# Patient Record
Sex: FEMALE | Race: BLACK OR AFRICAN AMERICAN | URBAN - METROPOLITAN AREA
[De-identification: names, ages, dates, MRNs, and addresses within clinical notes are randomized per-mention and may not be internally consistent; named-entity substitution may affect disease eponyms.]

---

## 2017-01-26 LAB
CHLAMYDIA, EXTERNAL: POSITIVE
HIV, EXTERNAL: NEGATIVE
N. GONORRHEA, EXTERNAL: NEGATIVE

## 2017-04-06 LAB
ANTIBODY SCREEN, EXTERNAL: NEGATIVE
CHLAMYDIA, EXTERNAL: NEGATIVE
HBSAG, EXTERNAL: NEGATIVE
RPR, EXTERNAL: NORMAL
RUBELLA, EXTERNAL: NORMAL
TYPE, ABO & RH, EXTERNAL: NORMAL

## 2017-08-07 ENCOUNTER — OFFICE VISIT (OUTPATIENT)
Dept: OBGYN CLINIC | Age: 22
End: 2017-08-07

## 2017-08-07 VITALS
WEIGHT: 245 LBS | HEIGHT: 64 IN | DIASTOLIC BLOOD PRESSURE: 74 MMHG | BODY MASS INDEX: 41.83 KG/M2 | SYSTOLIC BLOOD PRESSURE: 124 MMHG

## 2017-08-07 DIAGNOSIS — Z34.03 ENCOUNTER FOR SUPERVISION OF NORMAL FIRST PREGNANCY IN THIRD TRIMESTER: ICD-10-CM

## 2017-08-07 PROBLEM — Z34.00 SUPERVISION OF NORMAL FIRST PREGNANCY: Status: ACTIVE | Noted: 2017-08-07

## 2017-08-07 NOTE — MR AVS SNAPSHOT
Visit Information Date & Time Provider Department Dept. Phone Encounter #  
 8/7/2017  1:50 PM Renard Macias, [place] [phone] [id number] Upcoming Health Maintenance Date Due  
 HPV AGE 9Y-34Y (1 of 3 - Female 3 Dose Series) 5/15/2007 PAP AKA CERVICAL CYTOLOGY 5/15/2017 OB 3RD TRIMESTER TDAP 7/13/2017 INFLUENZA AGE 9 TO ADULT 8/1/2017 Allergies as of 8/7/2017  Review Complete On: 8/7/2017 By: Shauna Harding Severity Noted Reaction Type Reactions Ibuprofen  03/04/2015    Itching Tylenol [Acetaminophen]  08/07/2017    Unknown (comments) Current Immunizations  Never Reviewed No immunizations on file. Not reviewed this visit Vitals BP Height(growth percentile) Weight(growth percentile) BMI OB Status Smoking Status 124/74 5' 4\" (1.626 m) 245 lb (111.1 kg) 42.05 kg/m2 Pregnant Never Smoker BMI and BSA Data Body Mass Index Body Surface Area 42.05 kg/m 2 2.24 m 2 Your Updated Medication List  
  
   
This list is accurate as of: 8/7/17  2:26 PM.  Always use your most recent med list.  
  
  
  
  
 HYDROcodone-acetaminophen 5-325 mg per tablet Commonly known as:  Gibson Ojeda Take 1-2 Tabs by mouth every four (4) hours as needed for Pain. Max Daily Amount: 12 Tabs. Patient Instructions Learning About Pregnancy Your Care Instructions Your health in the early weeks of your pregnancy is particularly important for your babys health. Take good care of yourself. Anything you do that harms your body can also harm your baby. Make sure to go to all of your doctor appointments. Regular checkups will help keep you and your baby healthy. Follow-up care is a key part of your treatment and safety. Be sure to make and go to all appointments, and call your doctor if you are having problems. Its also a good idea to know your test results and keep a list of the medicines you take. How can you care for yourself at home? Diet · Eat a balanced diet. Make sure your diet includes plenty of beans, peas, and leafy green vegetables. · Do not skip meals or go for many hours without eating. If you are nauseated, try to eat a small, healthy snack every 2 to 3 hours. · Do not eat fish that has a high level of mercury, such as shark, swordfish, or mackerel. Do not eat more than one can of tuna each week. · Drink plenty of fluids, enough so that your urine is light yellow or clear like water. If you have kidney, heart, or liver disease and have to limit fluids, talk with your doctor before you increase the amount of fluids you drink. · Cut down on caffeine, such as coffee, tea, and cola. · Do not drink alcohol, such as beer, wine, or hard liquor. · Take a multivitamin that contains at least 400 micrograms (mcg) of folic acid to help prevent birth defects. Fortified cereal and whole wheat bread are good additional sources of folic acid. · Increase the calcium in your diet. Try to drink a quart of skim milk each day. You may also take calcium supplements and choose foods such as cheese and yogurt. Lifestyle · Make sure you go to your follow-up appointments. · Get plenty of rest. You may be unusually tired while you are pregnant. · Get at least 30 minutes of exercise on most days of the week. Walking is a good choice. If you have not exercised in the past, start out slowly. Take several short walks each day. · Do not smoke. If you need help quitting, talk to your doctor about stop-smoking programs. These can increase your chances of quitting for good. · Do not touch cat feces or litter boxes. Also, wash your hands after you handle raw meat, and fully cook all meat before you eat it. Wear gloves when you work in the yard or garden, and wash your hands well when you are done.  Cat feces, raw or undercooked meat, and contaminated dirt can cause an infection that may harm your baby or lead to a miscarriage. · Do not use saunas or hot tubs. Raising your body temperature may harm your baby. · Avoid chemical fumes, paint fumes, or poisons. · Do not use illegal drugs or alcohol. Medicines · Review all of your medicines with your doctor. Some of your routine medicines may need to be changed to protect your baby. · Use acetaminophen (Tylenol) to relieve minor problems, such as a mild headache or backache or a mild fever with cold symptoms. Do not use nonsteroidal anti-inflammatory drugs (NSAIDs), such as ibuprofen (Advil, Motrin) or naproxen (Aleve), unless your doctor says it is okay. · Do not take two or more pain medicines at the same time unless the doctor told you to. Many pain medicines have acetaminophen, which is Tylenol. Too much acetaminophen (Tylenol) can be harmful. · Take your medicines exactly as prescribed. Call your doctor if you think you are having a problem with your medicine. To manage morning sickness · If you feel sick when you first wake up, try eating a small snack (such as crackers) before you get out of bed. Allow some time to digest the snack, and then get out of bed slowly. · Do not skip meals or go for long periods without eating. An empty stomach can make nausea worse. · Eat small, frequent meals instead of three large meals each day. · Drink plenty of fluids. Sports drinks, such as Gatorade or Powerade, are good choices. · Eat foods that are high in protein but low in fat. · If you are taking iron supplements, ask your doctor if they are necessary. Iron can make nausea worse. · Avoid any smells, such as coffee, that make you feel sick. · Get lots of rest. Morning sickness may be worse when you are tired. Where can you learn more? Go to http://kirby-annalee.info/. Enter Z326 in the search box to learn more about \"Learning About Pregnancy. \" Current as of: March 16, 2017 Content Version: 11.3 © 8617-9094 [employer]. Care instructions adapted under license by [employer] (which disclaims liability or warranty for this information). If you have questions about a medical condition or this instruction, always ask your healthcare professional. [address] any warranty or liability for your use of this information. Introducing [facility]! [facility] introduces [employer] patient portal. Now you can access parts of your medical record, email your doctor's office, and request medication refills online. 1. In your internet browser, go to [email or web address] 2. Click on the First Time User? Click Here link in the Sign In box. You will see the New Member Sign Up page. 3. Enter your [employer] Access Code exactly as it appears below. You will not need to use this code after youve completed the sign-up process. If you do not sign up before the expiration date, you must request a new code. · [employer] Access Code: [email or web address] Expires: 11/5/2017  2:26 PM 
 
4. Enter the last four digits of your Social Security Number (xxxx) and Date of Birth (mm/dd/yyyy) as indicated and click Submit. You will be taken to the next sign-up page. 5. Create a [employer] ID. This will be your [employer] login ID and cannot be changed, so think of one that is secure and easy to remember. 6. Create a [employer] password. You can change your password at any time. 7. Enter your Password Reset Question and Answer. This can be used at a later time if you forget your password. 8. Enter your e-mail address. You will receive e-mail notification when new information is available in [address] 9. Click Sign Up. You can now view and download portions of your medical record. 10. Click the Download Summary menu link to download a portable copy of your medical information.  
 
If you have questions, please visit the Frequently Asked Questions section of the [employer]. Remember, [employer] is NOT to be used for urgent needs. For medical emergencies, dial 911. Now available from your iPhone and Android! Please provide this summary of care documentation to your next provider. Your primary care clinician is listed as NONE. If you have any questions after today's visit, please call [phone].

## 2017-08-07 NOTE — PROGRESS NOTES
Current pregnancy history:    Lou Feliz is a ,  25 y.o. female [address] No LMP recorded. Patient is pregnant. .  She presents for the evaluation of amenorrhea and a positive pregnancy test.    She is a transfer pregnant pt-- her records are available     Pregnancy symptoms:    Since her LMP she has experienced  urinary frequency, breast tenderness, and nausea. She has not been vomiting over the last few weeks. Associated signs and symptoms which she denies: dysuria, discharge, vaginal bleeding. Relevant past pregnancy history:   She has the following pregnancy history: first pregnancy   She has no history of  delivery. Relevant past medical history:(relevant to this pregnancy): noncontributory. Substance history: negative for alcohol, tobacco and street drugs. Positive for nothing. Exposure history: There is/are no indoor cat/s in the home. The patient was instructed to not change the cat litter. She admits close contact with children on a regular basis. She has had chicken pox or the vaccine in the past.   Patient denies issues with domestic violence. Genetic Screening/Teratology Counseling: (Includes patient, baby's father, or anyone in either family with:)  3.  Patient's age >/= 28 at [facility]?-- no  .   2. Thalassemia ([facility], [facility], [address], or  background): MCV<80?--no.     3.  Neural tube defect (meningomyelocele, spina bifida, anencephaly)?--no.   4.  Congenital heart defect?--no.  5.  Down syndrome?--no.   6.  Aman-Sachs (Gn[employer]ist, [place])?--no.   7.  Canavan's Disease?--no.   8.  Familial Dysautonomia?--no.   9.  Sickle cell disease or trait ()? --no   The patient has not been tested for sickle trait  10. Hemophilia or other blood disorders?--no. 11.  Muscular dystrophy?--no. 12.  Cystic fibrosis?--no. 13.  Morena's Chorea?--no. 14.  Mental retardation/autism (if yes was person tested for Fragile X)?--no. 15.  Other inherited genetic or chromosomal disorder?--no. 12.  Maternal metabolic disorder (DM, PKU, etc)?--no. 17.  Patient or FOB with a child with a birth defect not listed above?--no.  17a. Patient or FOB with a birth defect themselves?--no. 18.  Recurrent pregnancy loss, or stillbirth?--no. 19.  Any medications since LMP other than prenatal vitamins (include vitamins, supplements, OTC meds, drugs, alcohol)?--no. 20.  Any other genetic/environmental exposure to discuss?--no. Infection History:  1. Lives with someone with TB or TB exposed?--no.   2.  Patient or partner has history of genital herpes?--no.  3.  Rash or viral illness since LMP?--no.    4.  History of STD (GC, CT, HPV, syphilis, HIV)? --no   5. Other: OTHER? Past Medical History:   Diagnosis Date    Migraine      History reviewed. No pertinent surgical history. Social History     Occupational History    Not on file. Social History Main Topics    Smoking status: Never Smoker    Smokeless tobacco: Never Used    Alcohol use No    Drug use: No    Sexual activity: Yes     Partners: Male     Birth control/ protection: None     Family History   Problem Relation Age of Onset    No Known Problems Mother      OB History    Para Term  AB Living   1        SAB TAB Ectopic Molar Multiple Live Births              # Outcome Date GA Lbr Gaurav/2nd Weight Sex Delivery Anes PTL Lv   1 Current                 Allergies   Allergen Reactions    Ibuprofen Itching    Tylenol [Acetaminophen] Unknown (comments)     Prior to Admission medications    Medication Sig Start Date End Date Taking? Authorizing Provider   HYDROcodone-acetaminophen (NORCO) 5-325 mg per tablet Take 1-2 Tabs by mouth every four (4) hours as needed for Pain. Max Daily Amount: 12 Tabs.  3/4/15   Arian Wakefield MD        Review of Systems: History obtained from the patient  Constitutional: negative for weight loss, fever, night sweats  HEENT: negative for hearing loss, earache, congestion, snoring, sore throat  CV: negative for chest pain, palpitations, edema  Resp: negative for cough, shortness of breath, wheezing  Breast: negative for breast lumps, nipple discharge, galactorrhea  GI: negative for change in bowel habits, abdominal pain, black or bloody stools  : negative for frequency, dysuria, hematuria, vaginal discharge  MSK: negative for back pain, joint pain, muscle pain  Skin: negative for itching, rash, hives  Neuro: negative for dizziness, headache, confusion, weakness  Psych: negative for anxiety, depression, change in mood  Heme/lymph: negative for bleeding, bruising, pallor    Objective:  Visit Vitals    /74    Ht 5' 4\" (1.626 m)    Wt 245 lb (111.1 kg)    BMI 42.05 kg/m2       Physical Exam:     Constitutional  · Appearance: well-nourished, well developed, alert, in no acute distress    HENT  · Head  · Face: appears normal  · Eyes: appear normal  · Ears: normal  · Mouth: normal  · Lips: no lesions    Neck  · Inspection/Palpation: normal appearance, no masses or tenderness  · Lymph Nodes: no lymphadenopathy present  · Thyroid: gland size normal, nontender, no nodules or masses present on palpation    Chest  · Respiratory Effort: breathing unlabored    Skin  · General Inspection: no rash, no lesions identified    Neurologic/Psychiatric  · Mental Status:  · Orientation: grossly oriented to person, place and time  · Mood and Affect: mood normal, affect appropriate    Assessment:   Intrauterine pregnancy with the following problems identified: 30 week transfer. Plan:     Course of pregnancy discussed including visit schedule  Avoid alcoholic beverages and illicit/recreational drugs use  Take prenatal vitamins or folic acid daily. Hospital and practice style discussed with coverage system.   Discussed nutrition, toxoplasmosis precautions, sexual activity, exercise, need for influenza vaccine, environmental and work hazards, travel advice, screen for domestic violence, need for seat belts. Discussed seafood, unpasteurized dairy products, deli meat, artificial sweeteners, and caffeine. Information on prenatal classes/breastfeeding given. Information on circumcision given  Patient encouraged not to smoke. Discussed current prescription drug use. Given medication list.  Discussed the use of over the counter medications and chemicals. Route of delivery discussed, including risks, benefits, and alternatives of  versus repeat LTCS. Pt understands risk of hemorrhage during pregnancy and post delivery and would accept blood products if necessary in life-threatening emergencies    Handouts given to pt.

## 2017-08-07 NOTE — PATIENT INSTRUCTIONS
Learning About Pregnancy  Your Care Instructions  Your health in the early weeks of your pregnancy is particularly important for your babys health. Take good care of yourself. Anything you do that harms your body can also harm your baby. Make sure to go to all of your doctor appointments. Regular checkups will help keep you and your baby healthy. Follow-up care is a key part of your treatment and safety. Be sure to make and go to all appointments, and call your doctor if you are having problems. Its also a good idea to know your test results and keep a list of the medicines you take. How can you care for yourself at home? Diet  · Eat a balanced diet. Make sure your diet includes plenty of beans, peas, and leafy green vegetables. · Do not skip meals or go for many hours without eating. If you are nauseated, try to eat a small, healthy snack every 2 to 3 hours. · Do not eat fish that has a high level of mercury, such as shark, swordfish, or mackerel. Do not eat more than one can of tuna each week. · Drink plenty of fluids, enough so that your urine is light yellow or clear like water. If you have kidney, heart, or liver disease and have to limit fluids, talk with your doctor before you increase the amount of fluids you drink. · Cut down on caffeine, such as coffee, tea, and cola. · Do not drink alcohol, such as beer, wine, or hard liquor. · Take a multivitamin that contains at least 400 micrograms (mcg) of folic acid to help prevent birth defects. Fortified cereal and whole wheat bread are good additional sources of folic acid. · Increase the calcium in your diet. Try to drink a quart of skim milk each day. You may also take calcium supplements and choose foods such as cheese and yogurt. Lifestyle  · Make sure you go to your follow-up appointments. · Get plenty of rest. You may be unusually tired while you are pregnant. · Get at least 30 minutes of exercise on most days of the week.  Walking is a good choice. If you have not exercised in the past, start out slowly. Take several short walks each day. · Do not smoke. If you need help quitting, talk to your doctor about stop-smoking programs. These can increase your chances of quitting for good. · Do not touch cat feces or litter boxes. Also, wash your hands after you handle raw meat, and fully cook all meat before you eat it. Wear gloves when you work in the yard or garden, and wash your hands well when you are done. Cat feces, raw or undercooked meat, and contaminated dirt can cause an infection that may harm your baby or lead to a miscarriage. · Do not use saunas or hot tubs. Raising your body temperature may harm your baby. · Avoid chemical fumes, paint fumes, or poisons. · Do not use illegal drugs or alcohol. Medicines  · Review all of your medicines with your doctor. Some of your routine medicines may need to be changed to protect your baby. · Use acetaminophen (Tylenol) to relieve minor problems, such as a mild headache or backache or a mild fever with cold symptoms. Do not use nonsteroidal anti-inflammatory drugs (NSAIDs), such as ibuprofen (Advil, Motrin) or naproxen (Aleve), unless your doctor says it is okay. · Do not take two or more pain medicines at the same time unless the doctor told you to. Many pain medicines have acetaminophen, which is Tylenol. Too much acetaminophen (Tylenol) can be harmful. · Take your medicines exactly as prescribed. Call your doctor if you think you are having a problem with your medicine. To manage morning sickness  · If you feel sick when you first wake up, try eating a small snack (such as crackers) before you get out of bed. Allow some time to digest the snack, and then get out of bed slowly. · Do not skip meals or go for long periods without eating. An empty stomach can make nausea worse. · Eat small, frequent meals instead of three large meals each day. · Drink plenty of fluids.  Sports drinks, such as Gatorade or Powerade, are good choices. · Eat foods that are high in protein but low in fat. · If you are taking iron supplements, ask your doctor if they are necessary. Iron can make nausea worse. · Avoid any smells, such as coffee, that make you feel sick. · Get lots of rest. Morning sickness may be worse when you are tired. Where can you learn more? Go to http://kirby-annalee.info/. Enter P254 in the search box to learn more about \"Learning About Pregnancy. \"  Current as of: March 16, 2017  Content Version: 11.3  © 5107-3967 [employer]. Care instructions adapted under license by [employer] (which disclaims liability or warranty for this information). If you have questions about a medical condition or this instruction, always ask your healthcare professional. [address] any warranty or liability for your use of this information.

## 2017-08-21 ENCOUNTER — ROUTINE PRENATAL (OUTPATIENT)
Dept: OBGYN CLINIC | Age: 22
End: 2017-08-21

## 2017-08-21 VITALS — DIASTOLIC BLOOD PRESSURE: 70 MMHG | BODY MASS INDEX: 42.57 KG/M2 | SYSTOLIC BLOOD PRESSURE: 122 MMHG | WEIGHT: 248 LBS

## 2017-08-21 DIAGNOSIS — Z34.03 ENCOUNTER FOR SUPERVISION OF NORMAL FIRST PREGNANCY IN THIRD TRIMESTER: Primary | ICD-10-CM

## 2017-08-21 NOTE — MR AVS SNAPSHOT
Visit Information Date & Time Provider Department Dept. Phone Encounter #  
 8/21/2017  2:50 PM José Antonio Zazueta MD Андрей Elias [phone] [id number] Upcoming Health Maintenance Date Due  
 HPV AGE 9Y-34Y (1 of 3 - Female 3 Dose Series) 5/15/2007 PAP AKA CERVICAL CYTOLOGY 5/15/2017 OB 3RD TRIMESTER TDAP 7/13/2017 INFLUENZA AGE 9 TO ADULT 8/1/2017 Allergies as of 8/21/2017  Review Complete On: 8/21/2017 By: José Antonio Zazueta MD  
  
 Severity Noted Reaction Type Reactions Ibuprofen  03/04/2015    Itching Tylenol [Acetaminophen]  08/07/2017    Unknown (comments) Current Immunizations  Never Reviewed No immunizations on file. Not reviewed this visit You Were Diagnosed With   
  
 Codes Comments Encounter for supervision of normal first pregnancy in third trimester    -  Primary ICD-10-CM: Z34.03 
ICD-9-CM: V22.0 Vitals BP Weight(growth percentile) BMI OB Status Smoking Status 122/70 248 lb (112.5 kg) 42.57 kg/m2 Pregnant Never Smoker BMI and BSA Data Body Mass Index Body Surface Area 42.57 kg/m 2 2.25 m 2 Your Updated Medication List  
  
   
This list is accurate as of: 8/21/17  3:13 PM.  Always use your most recent med list.  
  
  
  
  
 HYDROcodone-acetaminophen 5-325 mg per tablet Commonly known as:  Joseph Matos Take 1-2 Tabs by mouth every four (4) hours as needed for Pain. Max Daily Amount: 12 Tabs. Patient Instructions Learning About Pregnancy Your Care Instructions Your health in the early weeks of your pregnancy is particularly important for your babys health. Take good care of yourself. Anything you do that harms your body can also harm your baby. Make sure to go to all of your doctor appointments. Regular checkups will help keep you and your baby healthy. Follow-up care is a key part of your treatment and safety.  Be sure to make and go to all appointments, and call your doctor if you are having problems. Its also a good idea to know your test results and keep a list of the medicines you take. How can you care for yourself at home? Diet · Eat a balanced diet. Make sure your diet includes plenty of beans, peas, and leafy green vegetables. · Do not skip meals or go for many hours without eating. If you are nauseated, try to eat a small, healthy snack every 2 to 3 hours. · Do not eat fish that has a high level of mercury, such as shark, swordfish, or mackerel. Do not eat more than one can of tuna each week. · Drink plenty of fluids, enough so that your urine is light yellow or clear like water. If you have kidney, heart, or liver disease and have to limit fluids, talk with your doctor before you increase the amount of fluids you drink. · Cut down on caffeine, such as coffee, tea, and cola. · Do not drink alcohol, such as beer, wine, or hard liquor. · Take a multivitamin that contains at least 400 micrograms (mcg) of folic acid to help prevent birth defects. Fortified cereal and whole wheat bread are good additional sources of folic acid. · Increase the calcium in your diet. Try to drink a quart of skim milk each day. You may also take calcium supplements and choose foods such as cheese and yogurt. Lifestyle · Make sure you go to your follow-up appointments. · Get plenty of rest. You may be unusually tired while you are pregnant. · Get at least 30 minutes of exercise on most days of the week. Walking is a good choice. If you have not exercised in the past, start out slowly. Take several short walks each day. · Do not smoke. If you need help quitting, talk to your doctor about stop-smoking programs. These can increase your chances of quitting for good. · Do not touch cat feces or litter boxes. Also, wash your hands after you handle raw meat, and fully cook all meat before you eat it.  Wear gloves when you work in the yard or garden, and wash your hands well when you are done. Cat feces, raw or undercooked meat, and contaminated dirt can cause an infection that may harm your baby or lead to a miscarriage. · Do not use saunas or hot tubs. Raising your body temperature may harm your baby. · Avoid chemical fumes, paint fumes, or poisons. · Do not use illegal drugs or alcohol. Medicines · Review all of your medicines with your doctor. Some of your routine medicines may need to be changed to protect your baby. · Use acetaminophen (Tylenol) to relieve minor problems, such as a mild headache or backache or a mild fever with cold symptoms. Do not use nonsteroidal anti-inflammatory drugs (NSAIDs), such as ibuprofen (Advil, Motrin) or naproxen (Aleve), unless your doctor says it is okay. · Do not take two or more pain medicines at the same time unless the doctor told you to. Many pain medicines have acetaminophen, which is Tylenol. Too much acetaminophen (Tylenol) can be harmful. · Take your medicines exactly as prescribed. Call your doctor if you think you are having a problem with your medicine. To manage morning sickness · If you feel sick when you first wake up, try eating a small snack (such as crackers) before you get out of bed. Allow some time to digest the snack, and then get out of bed slowly. · Do not skip meals or go for long periods without eating. An empty stomach can make nausea worse. · Eat small, frequent meals instead of three large meals each day. · Drink plenty of fluids. Sports drinks, such as Gatorade or Powerade, are good choices. · Eat foods that are high in protein but low in fat. · If you are taking iron supplements, ask your doctor if they are necessary. Iron can make nausea worse. · Avoid any smells, such as coffee, that make you feel sick. · Get lots of rest. Morning sickness may be worse when you are tired. Where can you learn more? Go to http://kirby-annalee.info/. Enter U257 in the search box to learn more about \"Learning About Pregnancy. \" Current as of: March 16, 2017 Content Version: 11.3 © 3928-8066 Everplaces[employer]. Care instructions adapted under license by [employer] (which disclaims liability or warranty for this information). If you have questions about a medical condition or this instruction, always ask your healthcare professional. [address] any warranty or liability for your use of this information. Introducing [facility] & HEALTH SERVICES! Marni Urbano introduces [employer] patient portal. Now you can access parts of your medical record, email your doctor's office, and request medication refills online. 1. In your internet browser, go to [email or web address] 2. Click on the First Time User? Click Here link in the Sign In box. You will see the New Member Sign Up page. 3. Enter your [employer] Access Code exactly as it appears below. You will not need to use this code after youve completed the sign-up process. If you do not sign up before the expiration date, you must request a new code. · [employer] Access Code: [email or web address] Expires: 11/5/2017  2:26 PM 
 
4. Enter the last four digits of your Social Security Number (xxxx) and Date of Birth (mm/dd/yyyy) as indicated and click Submit. You will be taken to the next sign-up page. 5. Create a [employer] ID. This will be your [employer] login ID and cannot be changed, so think of one that is secure and easy to remember. 6. Create a [employer] password. You can change your password at any time. 7. Enter your Password Reset Question and Answer. This can be used at a later time if you forget your password. 8. Enter your e-mail address. You will receive e-mail notification when new information is available in [address] 9. Click Sign Up. You can now view and download portions of your medical record. 10. Click the Download Summary menu link to download a portable copy of your medical information. If you have questions, please visit the Frequently Asked Questions section of the [employer] website. Remember, [employer] is NOT to be used for urgent needs. For medical emergencies, dial 911. Now available from your iPhone and Android! Please provide this summary of care documentation to your next provider. Your primary care clinician is listed as NONE. If you have any questions after today's visit, please call [phone].

## 2017-08-21 NOTE — PROGRESS NOTES
Pt doing well, see prenatal flowsheet. HC <5% will refer to [facility]    Physician review of ultrasound performed by technician    Today's ultrasound report and images were reviewed and discussed with the patient.   Please see images and imaging report entered by technician in PACS for more detail and progress

## 2017-08-21 NOTE — PATIENT INSTRUCTIONS
Learning About Pregnancy  Your Care Instructions  Your health in the early weeks of your pregnancy is particularly important for your babys health. Take good care of yourself. Anything you do that harms your body can also harm your baby. Make sure to go to all of your doctor appointments. Regular checkups will help keep you and your baby healthy. Follow-up care is a key part of your treatment and safety. Be sure to make and go to all appointments, and call your doctor if you are having problems. Its also a good idea to know your test results and keep a list of the medicines you take. How can you care for yourself at home? Diet  · Eat a balanced diet. Make sure your diet includes plenty of beans, peas, and leafy green vegetables. · Do not skip meals or go for many hours without eating. If you are nauseated, try to eat a small, healthy snack every 2 to 3 hours. · Do not eat fish that has a high level of mercury, such as shark, swordfish, or mackerel. Do not eat more than one can of tuna each week. · Drink plenty of fluids, enough so that your urine is light yellow or clear like water. If you have kidney, heart, or liver disease and have to limit fluids, talk with your doctor before you increase the amount of fluids you drink. · Cut down on caffeine, such as coffee, tea, and cola. · Do not drink alcohol, such as beer, wine, or hard liquor. · Take a multivitamin that contains at least 400 micrograms (mcg) of folic acid to help prevent birth defects. Fortified cereal and whole wheat bread are good additional sources of folic acid. · Increase the calcium in your diet. Try to drink a quart of skim milk each day. You may also take calcium supplements and choose foods such as cheese and yogurt. Lifestyle  · Make sure you go to your follow-up appointments. · Get plenty of rest. You may be unusually tired while you are pregnant. · Get at least 30 minutes of exercise on most days of the week.  Walking is a good choice. If you have not exercised in the past, start out slowly. Take several short walks each day. · Do not smoke. If you need help quitting, talk to your doctor about stop-smoking programs. These can increase your chances of quitting for good. · Do not touch cat feces or litter boxes. Also, wash your hands after you handle raw meat, and fully cook all meat before you eat it. Wear gloves when you work in the yard or garden, and wash your hands well when you are done. Cat feces, raw or undercooked meat, and contaminated dirt can cause an infection that may harm your baby or lead to a miscarriage. · Do not use saunas or hot tubs. Raising your body temperature may harm your baby. · Avoid chemical fumes, paint fumes, or poisons. · Do not use illegal drugs or alcohol. Medicines  · Review all of your medicines with your doctor. Some of your routine medicines may need to be changed to protect your baby. · Use acetaminophen (Tylenol) to relieve minor problems, such as a mild headache or backache or a mild fever with cold symptoms. Do not use nonsteroidal anti-inflammatory drugs (NSAIDs), such as ibuprofen (Advil, Motrin) or naproxen (Aleve), unless your doctor says it is okay. · Do not take two or more pain medicines at the same time unless the doctor told you to. Many pain medicines have acetaminophen, which is Tylenol. Too much acetaminophen (Tylenol) can be harmful. · Take your medicines exactly as prescribed. Call your doctor if you think you are having a problem with your medicine. To manage morning sickness  · If you feel sick when you first wake up, try eating a small snack (such as crackers) before you get out of bed. Allow some time to digest the snack, and then get out of bed slowly. · Do not skip meals or go for long periods without eating. An empty stomach can make nausea worse. · Eat small, frequent meals instead of three large meals each day. · Drink plenty of fluids.  Sports drinks, such as Gatorade or Powerade, are good choices. · Eat foods that are high in protein but low in fat. · If you are taking iron supplements, ask your doctor if they are necessary. Iron can make nausea worse. · Avoid any smells, such as coffee, that make you feel sick. · Get lots of rest. Morning sickness may be worse when you are tired. Where can you learn more? Go to http://kirby-annalee.info/. Enter O188 in the search box to learn more about \"Learning About Pregnancy. \"  Current as of: March 16, 2017  Content Version: 11.3  © 4367-0186 [employer]. Care instructions adapted under license by [employer] (which disclaims liability or warranty for this information). If you have questions about a medical condition or this instruction, always ask your healthcare professional. [address] any warranty or liability for your use of this information.

## 2017-09-01 ENCOUNTER — TELEPHONE (OUTPATIENT)
Dept: OBGYN CLINIC | Age: 22
End: 2017-09-01

## 2017-09-01 NOTE — TELEPHONE ENCOUNTER
WESLEYTCJOHN--- appt was schedule for [facility] on 9/6/17 @ 8:45- due to availability the appt is at the [facility] location-- she can call [phone] to reschedule is needed

## 2017-09-04 ENCOUNTER — HOSPITAL ENCOUNTER (OUTPATIENT)
Age: 22
Setting detail: OBSERVATION
Discharge: HOME | End: 2017-09-04
Attending: OBSTETRICS & GYNECOLOGY | Admitting: OBSTETRICS & GYNECOLOGY
Payer: COMMERCIAL

## 2017-09-04 VITALS — HEART RATE: 95 BPM | SYSTOLIC BLOOD PRESSURE: 125 MMHG | DIASTOLIC BLOOD PRESSURE: 85 MMHG | TEMPERATURE: 98.2 F

## 2017-09-04 PROBLEM — Z34.90 PREGNANCY: Status: ACTIVE | Noted: 2017-09-04

## 2017-09-04 PROCEDURE — 75810000275 HC EMERGENCY DEPT VISIT NO LEVEL OF CARE

## 2017-09-04 PROCEDURE — 99218 HC RM OBSERVATION: CPT

## 2017-09-04 PROCEDURE — 99283 EMERGENCY DEPT VISIT LOW MDM: CPT | Performed by: OBSTETRICS & GYNECOLOGY

## 2017-09-04 NOTE — IP AVS SNAPSHOT
[address] [facility] [address] 
 
 
 [address] [facility] 
[phone] Patient: Tiffani Mendoza MRN: [id number] :1995 You are allergic to the following Allergen Reactions Ibuprofen Itching Tylenol (Acetaminophen) Unknown (comments) Recent Documentation Breastfeeding? OB Status Smoking Status No Pregnant Never Smoker Emergency Contacts Name Discharge Info Relation Home Work Mobile Yancy Pierce  Mother [14]   [phone] About your hospitalization You were admitted on:  2017 You last received care in the:  [facility] LABOR & DELIVERY You were discharged on:  2017 Unit phone number:  [phone] Why you were hospitalized Your primary diagnosis was:  Not on File Your diagnoses also included:  Pregnancy Providers Seen During Your Hospitalizations Provider Role Specialty Primary office phone Herb Montero MD Attending Provider Obstetrics & Gynecology [phone] Your Primary Care Physician (PCP) Primary Care Physician Office Phone Office Fax NONE ** None ** ** None ** Follow-up Information None Your Appointments 2017  8:45 AM EDT  
OB ULTRASOUND > 14 WEEKS with ULTRASOUND [address] ([address]) [address] [place] [address]  
[phone]   2:50 PM EDT  
OB VISIT with Herb Montero MD  
[facility] ([address]) [address] [facility]  
[phone] Current Discharge Medication List  
  
ASK your doctor about these medications Dose & Instructions Dispensing Information Comments Morning Noon Evening Bedtime HYDROcodone-acetaminophen 5-325 mg per tablet Commonly known as:  Franca Restrepo Your last dose was: Your next dose is:    
   
   
 Dose:  1-2 Tab Take 1-2 Tabs by mouth every four (4) hours as needed for Pain. Max Daily Amount: 12 Tabs. Quantity:  13 Tab Refills:  0 Discharge Instructions Pregnancy Precautions: Care Instructions Your Care Instructions There is no sure way to prevent labor before your due date ( labor) or to prevent most other pregnancy problems. But there are things you can do to increase your chances of a healthy pregnancy. Go to your appointments, follow your doctor's advice, and take good care of yourself. Eat well, and exercise (if your doctor agrees). And make sure to drink plenty of water. Follow-up care is a key part of your treatment and safety. Be sure to make and go to all appointments, and call your doctor if you are having problems. It's also a good idea to know your test results and keep a list of the medicines you take. How can you care for yourself at home? · Make sure you go to your prenatal appointments. At each visit, your doctor will check your blood pressure. Your doctor will also check to see if you have protein in your urine. High blood pressure and protein in urine are signs of preeclampsia. This condition can be dangerous for you and your baby. · Drink plenty of fluids, enough so that your urine is light yellow or clear like water. Dehydration can cause contractions. If you have kidney, heart, or liver disease and have to limit fluids, talk with your doctor before you increase the amount of fluids you drink. · Tell your doctor right away if you notice any symptoms of an infection, such as: ¨ Burning when you urinate. ¨ A foul-smelling discharge from your vagina. ¨ Vaginal itching. ¨ Unexplained fever. ¨ Unusual pain or soreness in your uterus or lower belly. · Eat a balanced diet. Include plenty of foods that are high in calcium and iron. ¨ Foods high in calcium include milk, cheese, yogurt, almonds, and broccoli. ¨ Foods high in iron include red meat, shellfish, poultry, eggs, beans, raisins, whole-grain bread, and leafy green vegetables. · Do not smoke. If you need help quitting, talk to your doctor about stop-smoking programs and medicines. These can increase your chances of quitting for good. · Do not drink alcohol or use illegal drugs. · Follow your doctor's directions about activity. Your doctor will let you know how much, if any, exercise you can do. · Ask your doctor if you can have sex. If you are at risk for early labor, your doctor may ask you to not have sex. · Take care to prevent falls. During pregnancy, your joints are loose, and your balance is off. Sports such as bicycling, skiing, or in-line skating can increase your risk of falling. And don't ride horses or motorcycles, dive, water ski, scuba dive, or parachute jump while you are pregnant. · Avoid getting very hot. Do not use saunas or hot tubs. Avoid staying out in the sun in hot weather for long periods. Take acetaminophen (Tylenol) to lower a high fever. · Do not take any over-the-counter or herbal medicines or supplements without talking to your doctor or pharmacist first. 
When should you call for help? Call 911 anytime you think you may need emergency care. For example, call if: 
· You passed out (lost consciousness). · You have severe vaginal bleeding. · You have severe pain in your belly or pelvis. · You have had fluid gushing or leaking from your vagina and you know or think the umbilical cord is bulging into your vagina. If this happens, immediately get down on your knees so your rear end (buttocks) is higher than your head. This will decrease the pressure on the cord until help arrives. Call your doctor now or seek immediate medical care if: 
· You have signs of preeclampsia, such as: 
¨ Sudden swelling of your face, hands, or feet. ¨ New vision problems (such as dimness or blurring). ¨ A severe headache. · You have any vaginal bleeding. · You have belly pain or cramping. · You have a fever. · You have had regular contractions (with or without pain) for an hour. This means that you have 8 or more within 1 hour or 4 or more in 20 minutes after you change your position and drink fluids. · You have a sudden release of fluid from your vagina. · You have low back pain or pelvic pressure that does not go away. · You notice that your baby has stopped moving or is moving much less than normal. 
Watch closely for changes in your health, and be sure to contact your doctor if you have any problems. Where can you learn more? Go to http://kirby-annalee.info/. Enter [id number] in the search box to learn more about \"Pregnancy Precautions: Care Instructions. \" Current as of: March 16, 2017 Content Version: 11.3 © 8540-0171 gumi. Care instructions adapted under license by E[employer] (which disclaims liability or warranty for this information). If you have questions about a medical condition or this instruction, always ask your healthcare professional. [place] [address] any warranty or liability for your use of this information. Discharge Orders None Introducing [facility] & HEALTH SERVICES! Luzmaria Contreras introduces [email or web address] patient portal. Now you can access parts of your medical record, email your doctor's office, and request medication refills online. 1. In your internet browser, go to [email or web address] 2. Click on the First Time User? Click Here link in the Sign In box. You will see the New Member Sign Up page. 3. Enter your [email or web address] Access Code exactly as it appears below. You will not need to use this code after youve completed the sign-up process. If you do not sign up before the expiration date, you must request a new code. · [employer] Access Code: [email or web address] Expires: 11/5/2017  2:26 PM 
 
4. Enter the last four digits of your Social Security Number (xxxx) and Date of Birth (mm/dd/yyyy) as indicated and click Submit. You will be taken to the next sign-up page. 5. Create a [employer] ID. This will be your [employer] login ID and cannot be changed, so think of one that is secure and easy to remember. 6. Create a [employer] password. You can change your password at any time. 7. Enter your Password Reset Question and Answer. This can be used at a later time if you forget your password. 8. Enter your e-mail address. You will receive e-mail notification when new information is available in [address] 9. Click Sign Up. You can now view and download portions of your medical record. 10. Click the Download Summary menu link to download a portable copy of your medical information. If you have questions, please visit the Frequently Asked Questions section of the [employer] website. Remember, [employer] is NOT to be used for urgent needs. For medical emergencies, dial 911. Now available from your iPhone and Android! General Information Please provide this summary of care documentation to your next provider. Patient Signature:  ____________________________________________________________ Date:  ____________________________________________________________  
  
Sharon Patrick Provider Signature:  ____________________________________________________________ Date:  ____________________________________________________________

## 2017-09-04 NOTE — IP AVS SNAPSHOT
Tim Reed 
 
 
 [address] [facility] 
[phone] Patient: Jett Gastelum MRN: [id number] :1995 Current Discharge Medication List  
  
ASK your doctor about these medications Dose & Instructions Dispensing Information Comments Morning Noon Evening Bedtime HYDROcodone-acetaminophen 5-325 mg per tablet Commonly known as:  Steven Dietrich Your last dose was: Your next dose is:    
   
   
 Dose:  1-2 Tab Take 1-2 Tabs by mouth every four (4) hours as needed for Pain. Max Daily Amount: 12 Tabs. Quantity:  13 Tab Refills:  0

## 2017-09-04 NOTE — IP AVS SNAPSHOT
Summary of Care Report The Summary of Care report has been created to help improve care coordination. Users with access to [employer] or [address] (Web-based application) may access additional patient information including the Discharge Summary. If you are not currently a [address] user and need more information, please call the number listed below in the [place] [address] section and ask to be connected with Medical Records. Facility Information Name Address Phone [address] [place] [address] [phone] Patient Information Patient Name Sex  Britton Tovar ([id number]) Female 1995 Discharge Information Admitting Provider Service Area Unit Kobe Benavides MD / [facility] 2 Labor & Delivery / [phone] Discharge Provider Discharge Date/Time Discharge Disposition Destination (none) (none) (none) (none) Patient Language Language ENGLISH [13] Hospital Problems as of 2017  Reviewed: 2017  3:13 PM by Kelsey Lopez MD  
  
  
  
 Class Noted - Resolved Last Modified POA Active Problems Pregnancy  2017 - Present 2017 by Kobe Benavides MD Unknown Entered by Kobe Benavides MD  
  
Non-Hospital Problems as of 2017  Reviewed: 2017  3:13 PM by Kelsey Lopez MD  
  
  
  
 Class Noted - Resolved Last Modified Active Problems Supervision of normal first pregnancy  2017 - Present 2017 by Kelsey Lopez MD  
  Entered by Kelsey Lopez MD  
  Overview Addendum 2017  3:12 PM by Kelsey Lopez MD  
   30 week transfer HC <5%, [facility] referral 
  
  
  
You are allergic to the following Allergen Reactions Ibuprofen Itching Tylenol (Acetaminophen) Unknown (comments) Current Discharge Medication List  
  
ASK your doctor about these medications Dose & Instructions Dispensing Information Comments HYDROcodone-acetaminophen 5-325 mg per tablet Commonly known as:  Marvelyn Code Dose:  1-2 Tab Take 1-2 Tabs by mouth every four (4) hours as needed for Pain. Max Daily Amount: 12 Tabs. Quantity:  13 Tab Refills:  0 Follow-up Information None Discharge Instructions Pregnancy Precautions: Care Instructions Your Care Instructions There is no sure way to prevent labor before your due date ( labor) or to prevent most other pregnancy problems. But there are things you can do to increase your chances of a healthy pregnancy. Go to your appointments, follow your doctor's advice, and take good care of yourself. Eat well, and exercise (if your doctor agrees). And make sure to drink plenty of water. Follow-up care is a key part of your treatment and safety. Be sure to make and go to all appointments, and call your doctor if you are having problems. It's also a good idea to know your test results and keep a list of the medicines you take. How can you care for yourself at home? · Make sure you go to your prenatal appointments. At each visit, your doctor will check your blood pressure. Your doctor will also check to see if you have protein in your urine. High blood pressure and protein in urine are signs of preeclampsia. This condition can be dangerous for you and your baby. · Drink plenty of fluids, enough so that your urine is light yellow or clear like water. Dehydration can cause contractions. If you have kidney, heart, or liver disease and have to limit fluids, talk with your doctor before you increase the amount of fluids you drink. · Tell your doctor right away if you notice any symptoms of an infection, such as: ¨ Burning when you urinate. ¨ A foul-smelling discharge from your vagina. ¨ Vaginal itching. ¨ Unexplained fever. ¨ Unusual pain or soreness in your uterus or lower belly. · Eat a balanced diet. Include plenty of foods that are high in calcium and iron. ¨ Foods high in calcium include milk, cheese, yogurt, almonds, and broccoli. ¨ Foods high in iron include red meat, shellfish, poultry, eggs, beans, raisins, whole-grain bread, and leafy green vegetables. · Do not smoke. If you need help quitting, talk to your doctor about stop-smoking programs and medicines. These can increase your chances of quitting for good. · Do not drink alcohol or use illegal drugs. · Follow your doctor's directions about activity. Your doctor will let you know how much, if any, exercise you can do. · Ask your doctor if you can have sex. If you are at risk for early labor, your doctor may ask you to not have sex. · Take care to prevent falls. During pregnancy, your joints are loose, and your balance is off. Sports such as bicycling, skiing, or in-line skating can increase your risk of falling. And don't ride horses or motorcycles, dive, water ski, scuba dive, or parachute jump while you are pregnant. · Avoid getting very hot. Do not use saunas or hot tubs. Avoid staying out in the sun in hot weather for long periods. Take acetaminophen (Tylenol) to lower a high fever. · Do not take any over-the-counter or herbal medicines or supplements without talking to your doctor or pharmacist first. 
When should you call for help? Call 911 anytime you think you may need emergency care. For example, call if: 
· You passed out (lost consciousness). · You have severe vaginal bleeding. · You have severe pain in your belly or pelvis. · You have had fluid gushing or leaking from your vagina and you know or think the umbilical cord is bulging into your vagina. If this happens, immediately get down on your knees so your rear end (buttocks) is higher than your head. This will decrease the pressure on the cord until help arrives. Call your doctor now or seek immediate medical care if: 
· You have signs of preeclampsia, such as: 
¨ Sudden swelling of your face, hands, or feet. ¨ New vision problems (such as dimness or blurring). ¨ A severe headache. · You have any vaginal bleeding. · You have belly pain or cramping. · You have a fever. · You have had regular contractions (with or without pain) for an hour. This means that you have 8 or more within 1 hour or 4 or more in 20 minutes after you change your position and drink fluids. · You have a sudden release of fluid from your vagina. · You have low back pain or pelvic pressure that does not go away. · You notice that your baby has stopped moving or is moving much less than normal. 
Watch closely for changes in your health, and be sure to contact your doctor if you have any problems. Where can you learn more? Go to http://kirby-annalee.info/. Enter [id number] in the search box to learn more about \"Pregnancy Precautions: Care Instructions. \" Current as of: March 16, 2017 Content Version: 11.3 © 6478-4734 [employer]. Care instructions adapted under license by [employer] (which disclaims liability or warranty for this information). If you have questions about a medical condition or this instruction, always ask your healthcare professional. [place] [address] any warranty or liability for your use of this information. Chart Review Routing History Recipient Method Report Sent By Tammy Rodriguez MD  
Phone: [phone] [facility] CUSTOM LAB REPORT Elliott Wilburn [[id number]] 7/31/2017  3:39 PM 7/31/2017

## 2017-09-05 NOTE — DISCHARGE INSTRUCTIONS
Pregnancy Precautions: Care Instructions  Your Care Instructions  There is no sure way to prevent labor before your due date ( labor) or to prevent most other pregnancy problems. But there are things you can do to increase your chances of a healthy pregnancy. Go to your appointments, follow your doctor's advice, and take good care of yourself. Eat well, and exercise (if your doctor agrees). And make sure to drink plenty of water. Follow-up care is a key part of your treatment and safety. Be sure to make and go to all appointments, and call your doctor if you are having problems. It's also a good idea to know your test results and keep a list of the medicines you take. How can you care for yourself at home? · Make sure you go to your prenatal appointments. At each visit, your doctor will check your blood pressure. Your doctor will also check to see if you have protein in your urine. High blood pressure and protein in urine are signs of preeclampsia. This condition can be dangerous for you and your baby. · Drink plenty of fluids, enough so that your urine is light yellow or clear like water. Dehydration can cause contractions. If you have kidney, heart, or liver disease and have to limit fluids, talk with your doctor before you increase the amount of fluids you drink. · Tell your doctor right away if you notice any symptoms of an infection, such as:  ¨ Burning when you urinate. ¨ A foul-smelling discharge from your vagina. ¨ Vaginal itching. ¨ Unexplained fever. ¨ Unusual pain or soreness in your uterus or lower belly. · Eat a balanced diet. Include plenty of foods that are high in calcium and iron. ¨ Foods high in calcium include milk, cheese, yogurt, almonds, and broccoli. ¨ Foods high in iron include red meat, shellfish, poultry, eggs, beans, raisins, whole-grain bread, and leafy green vegetables. · Do not smoke.  If you need help quitting, talk to your doctor about stop-smoking programs and medicines. These can increase your chances of quitting for good. · Do not drink alcohol or use illegal drugs. · Follow your doctor's directions about activity. Your doctor will let you know how much, if any, exercise you can do. · Ask your doctor if you can have sex. If you are at risk for early labor, your doctor may ask you to not have sex. · Take care to prevent falls. During pregnancy, your joints are loose, and your balance is off. Sports such as bicycling, skiing, or in-line skating can increase your risk of falling. And don't ride horses or motorcycles, dive, water ski, scuba dive, or parachute jump while you are pregnant. · Avoid getting very hot. Do not use saunas or hot tubs. Avoid staying out in the sun in hot weather for long periods. Take acetaminophen (Tylenol) to lower a high fever. · Do not take any over-the-counter or herbal medicines or supplements without talking to your doctor or pharmacist first.  When should you call for help? Call 911 anytime you think you may need emergency care. For example, call if:  · You passed out (lost consciousness). · You have severe vaginal bleeding. · You have severe pain in your belly or pelvis. · You have had fluid gushing or leaking from your vagina and you know or think the umbilical cord is bulging into your vagina. If this happens, immediately get down on your knees so your rear end (buttocks) is higher than your head. This will decrease the pressure on the cord until help arrives. Call your doctor now or seek immediate medical care if:  · You have signs of preeclampsia, such as:  ¨ Sudden swelling of your face, hands, or feet. ¨ New vision problems (such as dimness or blurring). ¨ A severe headache. · You have any vaginal bleeding. · You have belly pain or cramping. · You have a fever. · You have had regular contractions (with or without pain) for an hour.  This means that you have 8 or more within 1 hour or 4 or more in 20 minutes after you change your position and drink fluids. · You have a sudden release of fluid from your vagina. · You have low back pain or pelvic pressure that does not go away. · You notice that your baby has stopped moving or is moving much less than normal.  Watch closely for changes in your health, and be sure to contact your doctor if you have any problems. Where can you learn more? Go to http://kirby-annalee.info/. Enter [id number] in the search box to learn more about \"Pregnancy Precautions: Care Instructions. \"  Current as of: March 16, 2017  Content Version: 11.3  © 3303-1139 [employer]. Care instructions adapted under license by [employer] (which disclaims liability or warranty for this information). If you have questions about a medical condition or this instruction, always ask your healthcare professional. [address] any warranty or liability for your use of this information.

## 2017-09-05 NOTE — H&P
Labor and Delivery Admission Note  2017    Patient is a 25 y.o. G1 at 34w4d presenting reporting mild cramping this evening. Denies bleeding or leaking fluid. Reports baby's movement has been normal.  States cramping is very mild but just wanted to [facility] sure she is not in labor since she didn't know what to expect. OBHX:   OB History      Para Term  AB Living    1         SAB TAB Ectopic Molar Multiple Live Births                   PMH:   Past Medical History:   Diagnosis Date    Migraine        PSH: No past surgical history on file. OB/GYN: Kristy Fix    Meds:   Prior to Admission Medications   Prescriptions Last Dose Informant Patient Reported? Taking? HYDROcodone-acetaminophen (NORCO) 5-325 mg per tablet Not Taking at Unknown time  No No   Sig: Take 1-2 Tabs by mouth every four (4) hours as needed for Pain. Max Daily Amount: 12 Tabs. Facility-Administered Medications: None       Allergies: Allergies   Allergen Reactions    Ibuprofen Itching    Tylenol [Acetaminophen] Unknown (comments)       Pertinent ROS: Review of Systems - Negative except as per HPI    [address]:   Family History   Problem Relation Age of Onset    No Known Problems Mother        SH:   Social History     Social History    Marital status: SINGLE     Spouse name: N/A    Number of children: N/A    Years of education: N/A     Occupational History    Not on file.      Social History Main Topics    Smoking status: Never Smoker    Smokeless tobacco: Never Used    Alcohol use No    Drug use: No    Sexual activity: Yes     Partners: Male     Birth control/ protection: None     Other Topics Concern    Not on file     Social History Narrative       OBJECTIVE:    Temp (24hrs), Av.2 °F (36.8 °C), Min:98.2 °F (36.8 °C), Max:98.2 °F (36.8 °C)    Visit Vitals    /85    Pulse 95    Temp 98.2 °F (36.8 °C)    Breastfeeding No       FHR baseline 140, category 1  [place] negative for contractions    Exam:  General: alert  HEENT:  normal   Abdomen:  Soft, non-tender  Cervix: closed/thick per RN exam  Fluid:  N/A  Extremities:  normal, no edema      Impression: G1 at 34w4d with mild cramping; no evidence of PTL. Reassuring fetal status. Plan: Will discharge home with labor precautions and fetal movement precautions. Patient will keep scheduled appt on Thursday or return as needed before then.     Torie Richardson MD  10:22 PM

## 2017-09-05 NOTE — ED TRIAGE NOTES
Called and talked to Sherwood Closs (charge) in L&D. Patient is 34 weeks,  1 Para 0, patient of  with lower abdominal and back pain all day. Denies her water breaking or vaginal discharge.   Patient to L&[facility] with [facility]

## 2017-09-05 NOTE — PROGRESS NOTES
2130 pt presents with complaints of cramping since mid-afternoon, denies leaking of fluid, vaginal bleeding and positive for fetal movement. 2138 toco readjusted, pt states she is feeling pain, no uc's palpated. 2140 po hydration encouraged  2152 dr Amira Thornton notified of pt arrival, pain scale, complaints of cramping and hydration status-24oz of water today. Will check cervix, continue to po hydrate and montior. 2219 dr Amira Thornton at bedside for assessment and eval. Plan of care discussed, pt comfortable with d/c to home with outpatient follow up for Thursday. D/c instructions given. Pt given opportunity to ask questions, pt voices understanding.

## 2017-09-07 ENCOUNTER — ROUTINE PRENATAL (OUTPATIENT)
Dept: OBGYN CLINIC | Age: 22
End: 2017-09-07

## 2017-09-07 VITALS — BODY MASS INDEX: 44.63 KG/M2 | WEIGHT: 260 LBS | DIASTOLIC BLOOD PRESSURE: 84 MMHG | SYSTOLIC BLOOD PRESSURE: 134 MMHG

## 2017-09-07 DIAGNOSIS — R60.9 SWELLING: ICD-10-CM

## 2017-09-07 DIAGNOSIS — Z34.03 ENCOUNTER FOR SUPERVISION OF NORMAL FIRST PREGNANCY IN THIRD TRIMESTER: Primary | ICD-10-CM

## 2017-09-07 LAB
GRBS, EXTERNAL: NEGATIVE
HCT, EXTERNAL: 36.9
HGB, EXTERNAL: 12.3
PLATELET CNT,   EXTERNAL: 279

## 2017-09-07 NOTE — PROGRESS NOTES
Pt doing well, see prenatal flowsheet. GBS today  Missed [facility] appointment yesterday. She will call to reschedule. 12 pouind weight gain with significant increased swelling, will check pih labs today. Strict pih precautions discussed.

## 2017-09-07 NOTE — MR AVS SNAPSHOT
Visit Information Date & Time Provider Department Dept. Phone Encounter #  
 9/7/2017  2:50 PM MD Андрей Rowan [phone] [id number] Your Appointments 9/7/2017  2:50 PM  
OB VISIT with MD Андрей Rowan ([address]) Appt Note: 2wk fob FW  
 [address] [facility] [address] [facility] [address]  
[facility] 31 [facility] Upcoming Health Maintenance Date Due  
 HPV AGE 9Y-34Y (1 of 3 - Female 3 Dose Series) 5/15/2007 PAP AKA CERVICAL CYTOLOGY 5/15/2017 OB 3RD TRIMESTER TDAP 7/13/2017 INFLUENZA AGE 9 TO ADULT 8/1/2017 Allergies as of 9/7/2017  Review Complete On: 9/7/2017 By: Nasir Pickard MD  
  
 Severity Noted Reaction Type Reactions Ibuprofen  03/04/2015    Itching Tylenol [Acetaminophen]  08/07/2017    Unknown (comments) Current Immunizations  Never Reviewed No immunizations on file. Not reviewed this visit You Were Diagnosed With   
  
 Codes Comments Encounter for supervision of normal first pregnancy in third trimester    -  Primary ICD-10-CM: Z34.03 
ICD-9-CM: V22.0 Vitals BP Weight(growth percentile) BMI OB Status Smoking Status 134/84 260 lb (117.9 kg) 44.63 kg/m2 Pregnant Never Smoker BMI and BSA Data Body Mass Index Body Surface Area  
 44.63 kg/m 2 2.31 m 2 Your Updated Medication List  
  
   
This list is accurate as of: 9/7/17  2:40 PM.  Always use your most recent med list.  
  
  
  
  
 HYDROcodone-acetaminophen 5-325 mg per tablet Commonly known as:  [place] Take 1-2 Tabs by mouth every four (4) hours as needed for Pain. Max Daily Amount: 12 Tabs. We Performed the Following GROUP B STREP, MARLEY + REFLEX [[id number] Custom] Introducing [facility]!    
 Marni Urbano introduces Cater to u patient portal. Now you can access parts of your medical record, email your doctor's office, and request medication refills online. 1. In your internet browser, go to [email or web address] 2. Click on the First Time User? Click Here link in the Sign In box. You will see the New Member Sign Up page. 3. Enter your Faction Skis Access Code exactly as it appears below. You will not need to use this code after youve completed the sign-up process. If you do not sign up before the expiration date, you must request a new code. · Faction Skis Access Code: [email or web address] Expires: 11/5/2017  2:26 PM 
 
4. Enter the last four digits of your Social Security Number (xxxx) and Date of Birth (mm/dd/yyyy) as indicated and click Submit. You will be taken to the next sign-up page. 5. Create a Faction Skis ID. This will be your Faction Skis login ID and cannot be changed, so think of one that is secure and easy to remember. 6. Create a Faction Skis password. You can change your password at any time. 7. Enter your Password Reset Question and Answer. This can be used at a later time if you forget your password. 8. Enter your e-mail address. You will receive e-mail notification when new information is available in [address] 9. Click Sign Up. You can now view and download portions of your medical record. 10. Click the Download Summary menu link to download a portable copy of your medical information. If you have questions, please visit the Frequently Asked Questions section of the Faction Skis website. Remember, Faction Skis is NOT to be used for urgent needs. For medical emergencies, dial 911. Now available from your iPhone and Android! Please provide this summary of care documentation to your next provider. Your primary care clinician is listed as NONE. If you have any questions after today's visit, please call [phone].

## 2017-09-08 LAB
ALBUMIN SERPL-MCNC: 3.4 G/DL (ref 3.5–5.5)
ALBUMIN/GLOB SERPL: 1.2 {RATIO} (ref 1.2–2.2)
ALP SERPL-CCNC: 95 IU/L (ref 39–117)
ALT SERPL-CCNC: 7 IU/L (ref 0–32)
AST SERPL-CCNC: 11 IU/L (ref 0–40)
BILIRUB SERPL-MCNC: <0.2 MG/DL (ref 0–1.2)
BUN SERPL-MCNC: 9 MG/DL (ref 6–20)
BUN/CREAT SERPL: 14 (ref 9–23)
CALCIUM SERPL-MCNC: 9.1 MG/DL (ref 8.7–10.2)
CHLORIDE SERPL-SCNC: 101 MMOL/L (ref 96–106)
CO2 SERPL-SCNC: 21 MMOL/L (ref 18–29)
CREAT SERPL-MCNC: 0.63 MG/DL (ref 0.57–1)
ERYTHROCYTE [DISTWIDTH] IN BLOOD BY AUTOMATED COUNT: 14.5 % (ref 12.3–15.4)
GLOBULIN SER CALC-MCNC: 2.8 G/DL (ref 1.5–4.5)
GLUCOSE SERPL-MCNC: 91 MG/DL (ref 65–99)
HCT VFR BLD AUTO: 36.9 % (ref 34–46.6)
HGB BLD-MCNC: 12.3 G/DL (ref 11.1–15.9)
MCH RBC QN AUTO: 30.9 PG (ref 26.6–33)
MCHC RBC AUTO-ENTMCNC: 33.3 G/DL (ref 31.5–35.7)
MCV RBC AUTO: 93 FL (ref 79–97)
PLATELET # BLD AUTO: 279 X10E3/UL (ref 150–379)
POTASSIUM SERPL-SCNC: 4.8 MMOL/L (ref 3.5–5.2)
PROT SERPL-MCNC: 6.2 G/DL (ref 6–8.5)
RBC # BLD AUTO: 3.98 X10E6/UL (ref 3.77–5.28)
SODIUM SERPL-SCNC: 136 MMOL/L (ref 134–144)
URATE SERPL-MCNC: 5 MG/DL (ref 2.5–7.1)
WBC # BLD AUTO: 7 X10E3/UL (ref 3.4–10.8)

## 2017-09-10 LAB — GP B STREP DNA SPEC QL NAA+PROBE: NEGATIVE

## 2017-09-11 ENCOUNTER — HOSPITAL ENCOUNTER (OUTPATIENT)
Dept: PERINATAL CARE | Age: 22
Discharge: HOME | End: 2017-09-11
Attending: OBSTETRICS & GYNECOLOGY
Payer: COMMERCIAL

## 2017-09-11 PROCEDURE — 76818 FETAL BIOPHYS PROFILE W/NST: CPT | Performed by: OBSTETRICS & GYNECOLOGY

## 2017-09-11 PROCEDURE — 76820 UMBILICAL ARTERY ECHO: CPT | Performed by: OBSTETRICS & GYNECOLOGY

## 2017-09-11 PROCEDURE — 76805 OB US >/= 14 WKS SNGL FETUS: CPT | Performed by: OBSTETRICS & GYNECOLOGY

## 2017-09-14 ENCOUNTER — HOSPITAL ENCOUNTER (OUTPATIENT)
Dept: PERINATAL CARE | Age: 22
Discharge: HOME | End: 2017-09-14
Attending: OBSTETRICS & GYNECOLOGY
Payer: COMMERCIAL

## 2017-09-14 ENCOUNTER — ROUTINE PRENATAL (OUTPATIENT)
Dept: OBGYN CLINIC | Age: 22
End: 2017-09-14

## 2017-09-14 VITALS — DIASTOLIC BLOOD PRESSURE: 78 MMHG | BODY MASS INDEX: 44.8 KG/M2 | SYSTOLIC BLOOD PRESSURE: 124 MMHG | WEIGHT: 261 LBS

## 2017-09-14 DIAGNOSIS — Z34.03 ENCOUNTER FOR SUPERVISION OF NORMAL FIRST PREGNANCY IN THIRD TRIMESTER: Primary | ICD-10-CM

## 2017-09-14 PROCEDURE — 76818 FETAL BIOPHYS PROFILE W/NST: CPT | Performed by: OBSTETRICS & GYNECOLOGY

## 2017-09-14 PROCEDURE — 76820 UMBILICAL ARTERY ECHO: CPT | Performed by: OBSTETRICS & GYNECOLOGY

## 2017-09-14 NOTE — PROGRESS NOTES
Saw [facility] today, IUGR with increased S:D, IOL schedule at 37 weeks (Friday with ruth Thursday afternoon). Strict, labor, rom, fm, vb precautions discussed.

## 2017-09-14 NOTE — PATIENT INSTRUCTIONS
Week 37 of Your Pregnancy: Care Instructions  Your Care Instructions    You are near the end of your pregnancy--and you're probably pretty uncomfortable. It may be harder to walk around. Lying down probably isn't comfortable either. You may have trouble getting to sleep or staying asleep. Most women deliver their babies between 40 and 41 weeks. This is a good time to think about packing a bag for the hospital with items you'll need. Then you'll be ready when labor starts. Follow-up care is a key part of your treatment and safety. Be sure to make and go to all appointments, and call your doctor if you are having problems. It's also a good idea to know your test results and keep a list of the medicines you take. How can you care for yourself at home? Learn about breastfeeding  · Breastfeeding is best for your baby and good for you. · Breast milk has antibodies to help your baby fight infections. · Mothers who breastfeed often lose weight faster, because making milk burns calories. · Learning the best ways to hold your baby will make breastfeeding easier. · Let your partner bathe and diaper the baby to keep your partner from feeling left out. Snuggle together when you breastfeed. · You may want to learn how to use a breast pump and store your milk. · If you choose to bottle feed, make the feeding feel like breastfeeding so you can bond with your baby. Always hold your baby and the bottle. Do not prop bottles or let your baby fall asleep with a bottle. Learn about crying  · It is common for babies to cry for 1 to 3 hours a day. Some cry more, some cry less. · Babies don't cry to make you upset or because you are a bad parent. · Crying is how your baby communicates. Your baby may be hungry; have gas; need a diaper change; or feel cold, warm, tired, lonely, or tense. Sometimes babies cry for unknown reasons. · If you respond to your baby's needs, he or she will learn to trust you.   · Try to stay calm when your baby cries. Your baby may get more upset if he or she senses that you are upset. Know how to care for your   · Your baby's umbilical cord stump will drop off on its own, usually between 1 and 2 weeks. To care for your baby's umbilical cord area:  ¨ Clean the area at the bottom of the cord 2 or 3 times a day. ¨ Pay special attention to the area where the cord attaches to the skin. ¨ Keep the diaper folded below the cord. ¨ Use a damp washcloth or cotton ball to sponge bathe your baby until the stump has come off. · Your baby's first dark stool is called meconium. After the meconium is passed, your baby will develop his or her own bowel pattern. ¨ Some babies, especially  babies, have several bowel movements a day. Others have one or two a day, or one every 2 to 3 days. ¨  babies often have loose, yellow stools. Formula-fed babies have more formed stools. ¨ If your baby's stools look like little pellets, he or she is constipated. After 2 days of constipation, call your baby's doctor. · If your baby will be circumcised, you can care for him at home. ¨ Gently rinse his penis with warm water after every diaper change. Do not try to remove the film that forms on the penis. This film will go away on its own. Pat dry. ¨ Put petroleum ointment, such as Vaseline, on the area of the diaper that will touch your baby's penis. This will keep the diaper from sticking to your baby. ¨ Ask the doctor about giving your baby acetaminophen (Tylenol) for pain. Where can you learn more? Go to http://kirby-annalee.info/. Enter [phone] [id number] in the search box to learn more about \"Week 37 of Your Pregnancy: Care Instructions. \"  Current as of: 2017  Content Version: 11.3  © 7100-9338 "[employer]. Care instructions adapted under license by [employer] (which disclaims liability or warranty for this information).  If you have questions about a medical condition or this instruction, always ask your healthcare professional. [place] [address] any warranty or liability for your use of this information.

## 2017-09-14 NOTE — MR AVS SNAPSHOT
Visit Information Date & Time Provider Department Dept. Phone Encounter #  
 9/14/2017 10:50 AM Herb Montero MD [place]ндр[facility] Elias [phone] [id number] Upcoming Health Maintenance Date Due  
 HPV AGE 9Y-34Y (1 of 3 - Female 3 Dose Series) 5/15/2007 PAP AKA CERVICAL CYTOLOGY 5/15/2017 OB 3RD TRIMESTER TDAP 7/13/2017 INFLUENZA AGE 9 TO ADULT 8/1/2017 Allergies as of 9/14/2017  Review Complete On: 9/14/2017 By: Herb Montero MD  
  
 Severity Noted Reaction Type Reactions Ibuprofen  03/04/2015    Itching Tylenol [Acetaminophen]  08/07/2017    Unknown (comments) Current Immunizations  Never Reviewed No immunizations on file. Not reviewed this visit You Were Diagnosed With   
  
 Codes Comments Encounter for supervision of normal first pregnancy in third trimester    -  Primary ICD-10-CM: Z34.03 
ICD-9-CM: V22.0 Vitals BP Weight(growth percentile) BMI OB Status Smoking Status 124/78 261 lb (118.4 kg) 44.8 kg/m2 Pregnant Never Smoker BMI and BSA Data Body Mass Index Body Surface Area 44.8 kg/m 2 2.31 m 2 Your Updated Medication List  
  
   
This list is accurate as of: 9/14/17 11:10 AM.  Always use your most recent med list.  
  
  
  
  
 HYDROcodone-acetaminophen 5-325 mg per tablet Commonly known as:  [place] Take 1-2 Tabs by mouth every four (4) hours as needed for Pain. Max Daily Amount: 12 Tabs. Patient Instructions Week 37 of Your Pregnancy: Care Instructions Your Care Instructions You are near the end of your pregnancyand you're probably pretty uncomfortable. It may be harder to walk around. Lying down probably isn't comfortable either. You may have trouble getting to sleep or staying asleep. Most women deliver their babies between 40 and 41 weeks. This is a good time to think about packing a bag for the hospital with items you'll need. Then you'll be ready when labor starts. Follow-up care is a key part of your treatment and safety. Be sure to make and go to all appointments, and call your doctor if you are having problems. It's also a good idea to know your test results and keep a list of the medicines you take. How can you care for yourself at home? Learn about breastfeeding · Breastfeeding is best for your baby and good for you. · Breast milk has antibodies to help your baby fight infections. · Mothers who breastfeed often lose weight faster, because making milk burns calories. · Learning the best ways to hold your baby will make breastfeeding easier. · Let your partner bathe and diaper the baby to keep your partner from feeling left out. Snuggle together when you breastfeed. · You may want to learn how to use a breast pump and store your milk. · If you choose to bottle feed, make the feeding feel like breastfeeding so you can bond with your baby. Always hold your baby and the bottle. Do not prop bottles or let your baby fall asleep with a bottle. Learn about crying · It is common for babies to cry for 1 to 3 hours a day. Some cry more, some cry less. · Babies don't cry to make you upset or because you are a bad parent. · Crying is how your baby communicates. Your baby may be hungry; have gas; need a diaper change; or feel cold, warm, tired, lonely, or tense. Sometimes babies cry for unknown reasons. · If you respond to your baby's needs, he or she will learn to trust you. · Try to stay calm when your baby cries. Your baby may get more upset if he or she senses that you are upset. Know how to care for your  · Your baby's umbilical cord stump will drop off on its own, usually between 1 and 2 weeks. To care for your baby's umbilical cord area: ¨ Clean the area at the bottom of the cord 2 or 3 times a day. ¨ Pay special attention to the area where the cord attaches to the skin. ¨ Keep the diaper folded below the cord. ¨ Use a damp washcloth or cotton ball to sponge bathe your baby until the stump has come off. · Your baby's first dark stool is called meconium. After the meconium is passed, your baby will develop his or her own bowel pattern. ¨ Some babies, especially  babies, have several bowel movements a day. Others have one or two a day, or one every 2 to 3 days. ¨  babies often have loose, yellow stools. Formula-fed babies have more formed stools. ¨ If your baby's stools look like little pellets, he or she is constipated. After 2 days of constipation, call your baby's doctor. · If your baby will be circumcised, you can care for him at home. ¨ Gently rinse his penis with warm water after every diaper change. Do not try to remove the film that forms on the penis. This film will go away on its own. Pat dry. ¨ Put petroleum ointment, such as Vaseline, on the area of the diaper that will touch your baby's penis. This will keep the diaper from sticking to your baby. ¨ Ask the doctor about giving your baby acetaminophen (Tylenol) for pain. Where can you learn more? Go to http://kirby-annalee.info/. Enter 68 21 97 in the search box to learn more about \"Week 37 of Your Pregnancy: Care Instructions. \" Current as of: March 16, 2017 Content Version: 11.3 © 5537-3133 [employer]. Care instructions adapted under license by [employer] (which disclaims liability or warranty for this information). If you have questions about a medical condition or this instruction, always ask your healthcare professional. [place] [address] any warranty or liability for your use of this information. Introducing [facility]! [employer] introduces [employer] patient portal. Now you can access parts of your medical record, email your doctor's office, and request medication refills online. 1. In your internet browser, go to [email or web address] 2. Click on the First Time User? Click Here link in the Sign In box. You will see the New Member Sign Up page. 3. Enter your Uploadcare Access Code exactly as it appears below. You will not need to use this code after youve completed the sign-up process. If you do not sign up before the expiration date, you must request a new code. · Uploadcare Access Code: [email or web address] Expires: 11/5/2017  2:26 PM 
 
4. Enter the last four digits of your Social Security Number (xxxx) and Date of Birth (mm/dd/yyyy) as indicated and click Submit. You will be taken to the next sign-up page. 5. Create a Uploadcare ID. This will be your Uploadcare login ID and cannot be changed, so think of one that is secure and easy to remember. 6. Create a Uploadcare password. You can change your password at any time. 7. Enter your Password Reset Question and Answer. This can be used at a later time if you forget your password. 8. Enter your e-mail address. You will receive e-mail notification when new information is available in [address] 9. Click Sign Up. You can now view and download portions of your medical record. 10. Click the Download Summary menu link to download a portable copy of your medical information. If you have questions, please visit the Frequently Asked Questions section of the Uploadcare website. Remember, Uploadcare is NOT to be used for urgent needs. For medical emergencies, dial 911. Now available from your iPhone and Android! Please provide this summary of care documentation to your next provider. Your primary care clinician is listed as NONE. If you have any questions after today's visit, please call [phone].

## 2017-09-21 ENCOUNTER — HOSPITAL ENCOUNTER (INPATIENT)
Age: 22
LOS: 3 days | Discharge: HOME | DRG: 560 | End: 2017-09-24
Attending: OBSTETRICS & GYNECOLOGY | Admitting: OBSTETRICS & GYNECOLOGY
Payer: COMMERCIAL

## 2017-09-21 ENCOUNTER — ROUTINE PRENATAL (OUTPATIENT)
Dept: OBGYN CLINIC | Age: 22
End: 2017-09-21

## 2017-09-21 VITALS — BODY MASS INDEX: 45.76 KG/M2 | SYSTOLIC BLOOD PRESSURE: 128 MMHG | DIASTOLIC BLOOD PRESSURE: 76 MMHG | WEIGHT: 266.6 LBS

## 2017-09-21 DIAGNOSIS — O36.5930 IUGR (INTRAUTERINE GROWTH RESTRICTION) AFFECTING CARE OF MOTHER, THIRD TRIMESTER, NOT APPLICABLE OR UNSPECIFIED FETUS: ICD-10-CM

## 2017-09-21 DIAGNOSIS — Z34.03 ENCOUNTER FOR SUPERVISION OF NORMAL FIRST PREGNANCY IN THIRD TRIMESTER: Primary | ICD-10-CM

## 2017-09-21 LAB
ALBUMIN SERPL-MCNC: 2.7 G/DL (ref 3.5–5)
ALBUMIN/GLOB SERPL: 0.7 {RATIO} (ref 1.1–2.2)
ALP SERPL-CCNC: 121 U/L (ref 45–117)
ALT SERPL-CCNC: 14 U/L (ref 12–78)
ANION GAP SERPL CALC-SCNC: 11 MMOL/L (ref 5–15)
AST SERPL-CCNC: 15 U/L (ref 15–37)
BASOPHILS # BLD: 0 K/UL (ref 0–0.1)
BASOPHILS NFR BLD: 0 % (ref 0–1)
BILIRUB SERPL-MCNC: 0.2 MG/DL (ref 0.2–1)
BUN SERPL-MCNC: 7 MG/DL (ref 6–20)
BUN/CREAT SERPL: 10 (ref 12–20)
CALCIUM SERPL-MCNC: 8.8 MG/DL (ref 8.5–10.1)
CHLORIDE SERPL-SCNC: 103 MMOL/L (ref 97–108)
CO2 SERPL-SCNC: 22 MMOL/L (ref 21–32)
CREAT SERPL-MCNC: 0.67 MG/DL (ref 0.55–1.02)
EOSINOPHIL # BLD: 0.1 K/UL (ref 0–0.4)
EOSINOPHIL NFR BLD: 2 % (ref 0–7)
ERYTHROCYTE [DISTWIDTH] IN BLOOD BY AUTOMATED COUNT: 13.7 % (ref 11.5–14.5)
GLOBULIN SER CALC-MCNC: 3.7 G/DL (ref 2–4)
GLUCOSE SERPL-MCNC: 75 MG/DL (ref 65–100)
HCT VFR BLD AUTO: 36.8 % (ref 35–47)
HGB BLD-MCNC: 12.6 G/DL (ref 11.5–16)
LDH SERPL L TO P-CCNC: 203 U/L (ref 81–246)
LYMPHOCYTES # BLD: 1.7 K/UL (ref 0.8–3.5)
LYMPHOCYTES NFR BLD: 27 % (ref 12–49)
MCH RBC QN AUTO: 31 PG (ref 26–34)
MCHC RBC AUTO-ENTMCNC: 34.2 G/DL (ref 30–36.5)
MCV RBC AUTO: 90.4 FL (ref 80–99)
MONOCYTES # BLD: 0.9 K/UL (ref 0–1)
MONOCYTES NFR BLD: 14 % (ref 5–13)
NEUTS SEG # BLD: 3.7 K/UL (ref 1.8–8)
NEUTS SEG NFR BLD: 57 % (ref 32–75)
PLATELET # BLD AUTO: 234 K/UL (ref 150–400)
POTASSIUM SERPL-SCNC: 4 MMOL/L (ref 3.5–5.1)
PROT SERPL-MCNC: 6.4 G/DL (ref 6.4–8.2)
RBC # BLD AUTO: 4.07 M/UL (ref 3.8–5.2)
SODIUM SERPL-SCNC: 136 MMOL/L (ref 136–145)
WBC # BLD AUTO: 6.3 K/UL (ref 3.6–11)

## 2017-09-21 PROCEDURE — 75410000002 HC LABOR FEE PER 1 HR: Performed by: OBSTETRICS & GYNECOLOGY

## 2017-09-21 PROCEDURE — 59200 INSERT CERVICAL DILATOR: CPT | Performed by: OBSTETRICS & GYNECOLOGY

## 2017-09-21 PROCEDURE — 74011250637 HC RX REV CODE- 250/637: Performed by: OBSTETRICS & GYNECOLOGY

## 2017-09-21 PROCEDURE — 76060000078 HC EPIDURAL ANESTHESIA: Performed by: ANESTHESIOLOGY

## 2017-09-21 PROCEDURE — 74011250636 HC RX REV CODE- 250/636: Performed by: OBSTETRICS & GYNECOLOGY

## 2017-09-21 PROCEDURE — 77010026065 HC OXYGEN MINIMUM MEDICAL AIR: Performed by: OBSTETRICS & GYNECOLOGY

## 2017-09-21 PROCEDURE — 65270000029 HC RM PRIVATE

## 2017-09-21 PROCEDURE — 75410000000 HC DELIVERY VAGINAL/SINGLE: Performed by: OBSTETRICS & GYNECOLOGY

## 2017-09-21 PROCEDURE — 36415 COLL VENOUS BLD VENIPUNCTURE: CPT | Performed by: OBSTETRICS & GYNECOLOGY

## 2017-09-21 PROCEDURE — 80053 COMPREHEN METABOLIC PANEL: CPT | Performed by: OBSTETRICS & GYNECOLOGY

## 2017-09-21 PROCEDURE — 85025 COMPLETE CBC W/AUTO DIFF WBC: CPT | Performed by: OBSTETRICS & GYNECOLOGY

## 2017-09-21 PROCEDURE — 83615 LACTATE (LD) (LDH) ENZYME: CPT | Performed by: OBSTETRICS & GYNECOLOGY

## 2017-09-21 PROCEDURE — 75410000003 HC RECOV DEL/VAG/CSECN EA 0.5 HR: Performed by: OBSTETRICS & GYNECOLOGY

## 2017-09-21 RX ORDER — BUTORPHANOL TARTRATE 2 MG/ML
2 INJECTION INTRAMUSCULAR; INTRAVENOUS
Status: COMPLETED | OUTPATIENT
Start: 2017-09-21 | End: 2017-09-21

## 2017-09-21 RX ORDER — SODIUM CHLORIDE 0.9 % (FLUSH) 0.9 %
5-10 SYRINGE (ML) INJECTION AS NEEDED
Status: DISCONTINUED | OUTPATIENT
Start: 2017-09-21 | End: 2017-09-23 | Stop reason: SDUPTHER

## 2017-09-21 RX ORDER — SODIUM CHLORIDE, SODIUM LACTATE, POTASSIUM CHLORIDE, CALCIUM CHLORIDE 600; 310; 30; 20 MG/100ML; MG/100ML; MG/100ML; MG/100ML
100 INJECTION, SOLUTION INTRAVENOUS CONTINUOUS
Status: DISCONTINUED | OUTPATIENT
Start: 2017-09-21 | End: 2017-09-24 | Stop reason: HOSPADM

## 2017-09-21 RX ORDER — SODIUM CHLORIDE 0.9 % (FLUSH) 0.9 %
5-10 SYRINGE (ML) INJECTION EVERY 8 HOURS
Status: DISCONTINUED | OUTPATIENT
Start: 2017-09-21 | End: 2017-09-23 | Stop reason: SDUPTHER

## 2017-09-21 RX ORDER — SODIUM CHLORIDE, SODIUM LACTATE, POTASSIUM CHLORIDE, CALCIUM CHLORIDE 600; 310; 30; 20 MG/100ML; MG/100ML; MG/100ML; MG/100ML
999 INJECTION, SOLUTION INTRAVENOUS
Status: DISPENSED | OUTPATIENT
Start: 2017-09-21 | End: 2017-09-22

## 2017-09-21 RX ORDER — ZOLPIDEM TARTRATE 5 MG/1
10 TABLET ORAL
Status: DISCONTINUED | OUTPATIENT
Start: 2017-09-21 | End: 2017-09-23 | Stop reason: SDUPTHER

## 2017-09-21 RX ADMIN — SODIUM CHLORIDE, SODIUM LACTATE, POTASSIUM CHLORIDE, AND CALCIUM CHLORIDE 100 ML/HR: 600; 310; 30; 20 INJECTION, SOLUTION INTRAVENOUS at 16:00

## 2017-09-21 RX ADMIN — BUTORPHANOL TARTRATE 2 MG: 2 INJECTION, SOLUTION INTRAMUSCULAR; INTRAVENOUS at 18:05

## 2017-09-21 RX ADMIN — SODIUM CHLORIDE, SODIUM LACTATE, POTASSIUM CHLORIDE, AND CALCIUM CHLORIDE 100 ML/HR: 600; 310; 30; 20 INJECTION, SOLUTION INTRAVENOUS at 17:36

## 2017-09-21 RX ADMIN — ZOLPIDEM TARTRATE 10 MG: 5 TABLET ORAL at 21:53

## 2017-09-21 NOTE — H&P
History & Physical    Name: Yessica Wagner MRN: [id number]  SSN: [id number]    YOB: 1995  Age: 25 y.o. Sex: female        Subjective:     Estimated Date of Delivery: 10/12/17  OB History      Para Term  AB Living    1         SAB TAB Ectopic Molar Multiple Live Births                   Ms. Beronica Miller is admitted with pregnancy at 37w0d for induction of labor. Prenatal course was complicated by IUGR with increased S:D ratio. Please see prenatal records for details. Past Medical History:   Diagnosis Date    Migraine     Trauma 2014    fell and dislocated r shoulder     No past surgical history on file. Social History     Occupational History    Not on file. Social History Main Topics    Smoking status: Never Smoker    Smokeless tobacco: Never Used    Alcohol use No    Drug use: No    Sexual activity: Yes     Partners: Male     Birth control/ protection: None     Family History   Problem Relation Age of Onset    No Known Problems Mother        Allergies   Allergen Reactions    Ibuprofen Itching    Tylenol [Acetaminophen] Unknown (comments)     Stuffy nose and scratchy throat     Prior to Admission medications    Medication Sig Start Date End Date Taking? Authorizing Provider   HYDROcodone-acetaminophen (NORCO) 5-325 mg per tablet Take 1-2 Tabs by mouth every four (4) hours as needed for Pain. Max Daily Amount: 12 Tabs. 3/4/15   Jackie Gray MD        Review of Systems: A comprehensive review of systems was negative except for that written in the HPI. Objective:     Vitals:  Vitals:    17 1518   Height: 5' 3\" (1.6 m)        Physical Exam:  Cervical Exam: 30/0.5/-3/V miranda placed in the office  Membranes:  Intact  Fetal Heart Rate: Reactive    Prenatal Labs:   Lab Results   Component Value Date/Time    Pancho, External negative 2017        Assessment/Plan:     Plan: Admit for iol for iugr at 37 week with increased S:D ratio. Reassuring fetal status. Group B Strep was negative.   Will start pit at 4 am.    Signed By:  Bill Wells MD     September 21, 2017

## 2017-09-21 NOTE — PROGRESS NOTES
Pt doing well, see prenatal flowsheet. Cervix 30/0.5/-3/V  Intracervical miranda placed without difficulty- inflated with 60 cc normal saline. Minor Rebecca

## 2017-09-21 NOTE — PROGRESS NOTES
Cervical Miranda insertion procedure note    Tarun Saleh is a ,  25 y.o. female who presents today far placement of a miranda catheter in the cervix for ripening. Her cervix is unfavorable and she is scheduled for induction tomorrow. She has elected to have a cervical miranda catheter placement today. The risks, benefits and assets of the procedure were discussed. Her questions were answered. PROCEDURE: The vagina and cervix was prepped with Zephiran. A Miranda catheter was placed through the cervix without difficulty. The bulb was inflated with 30 cc of saline. Bleeding was minimal. The patient's level of discomfort was minimal.   POST PROCEDURE: The patient tolerated the procedure well. There were no complications. She was admitted as an outpatient for monitoring overnight. She was given labor and ROM warnings.

## 2017-09-21 NOTE — PATIENT INSTRUCTIONS
Week 37 of Your Pregnancy: Care Instructions  Your Care Instructions    You are near the end of your pregnancy--and you're probably pretty uncomfortable. It may be harder to walk around. Lying down probably isn't comfortable either. You may have trouble getting to sleep or staying asleep. Most women deliver their babies between 40 and 41 weeks. This is a good time to think about packing a bag for the hospital with items you'll need. Then you'll be ready when labor starts. Follow-up care is a key part of your treatment and safety. Be sure to make and go to all appointments, and call your doctor if you are having problems. It's also a good idea to know your test results and keep a list of the medicines you take. How can you care for yourself at home? Learn about breastfeeding  · Breastfeeding is best for your baby and good for you. · Breast milk has antibodies to help your baby fight infections. · Mothers who breastfeed often lose weight faster, because making milk burns calories. · Learning the best ways to hold your baby will make breastfeeding easier. · Let your partner bathe and diaper the baby to keep your partner from feeling left out. Snuggle together when you breastfeed. · You may want to learn how to use a breast pump and store your milk. · If you choose to bottle feed, make the feeding feel like breastfeeding so you can bond with your baby. Always hold your baby and the bottle. Do not prop bottles or let your baby fall asleep with a bottle. Learn about crying  · It is common for babies to cry for 1 to 3 hours a day. Some cry more, some cry less. · Babies don't cry to make you upset or because you are a bad parent. · Crying is how your baby communicates. Your baby may be hungry; have gas; need a diaper change; or feel cold, warm, tired, lonely, or tense. Sometimes babies cry for unknown reasons. · If you respond to your baby's needs, he or she will learn to trust you.   · Try to stay calm when your baby cries. Your baby may get more upset if he or she senses that you are upset. Know how to care for your   · Your baby's umbilical cord stump will drop off on its own, usually between 1 and 2 weeks. To care for your baby's umbilical cord area:  ¨ Clean the area at the bottom of the cord 2 or 3 times a day. ¨ Pay special attention to the area where the cord attaches to the skin. ¨ Keep the diaper folded below the cord. ¨ Use a damp washcloth or cotton ball to sponge bathe your baby until the stump has come off. · Your baby's first dark stool is called meconium. After the meconium is passed, your baby will develop his or her own bowel pattern. ¨ Some babies, especially  babies, have several bowel movements a day. Others have one or two a day, or one every 2 to 3 days. ¨  babies often have loose, yellow stools. Formula-fed babies have more formed stools. ¨ If your baby's stools look like little pellets, he or she is constipated. After 2 days of constipation, call your baby's doctor. · If your baby will be circumcised, you can care for him at home. ¨ Gently rinse his penis with warm water after every diaper change. Do not try to remove the film that forms on the penis. This film will go away on its own. Pat dry. ¨ Put petroleum ointment, such as Vaseline, on the area of the diaper that will touch your baby's penis. This will keep the diaper from sticking to your baby. ¨ Ask the doctor about giving your baby acetaminophen (Tylenol) for pain. Where can you learn more? Go to http://kirby-annalee.info/. Enter 68 21 97 in the search box to learn more about \"Week 37 of Your Pregnancy: Care Instructions. \"  Current as of: 2017  Content Version: 11.3  © 4120-2324 [employer]. Care instructions adapted under license by [employer] (which disclaims liability or warranty for this information).  If you have questions about a medical condition or this instruction, always ask your healthcare professional. [place] [address] any warranty or liability for your use of this information.

## 2017-09-21 NOTE — IP AVS SNAPSHOT
Summary of Care Report The Summary of Care report has been created to help improve care coordination. Users with access to [email or web address] or [address] (Web-based application) may access additional patient information including the Discharge Summary. If you are not currently a [address] user and need more information, please call the number listed below in the [place] [address] section and ask to be connected with Medical Records. Facility Information Name Address Phone [address] [address] [phone] Patient Information Patient Name Sex  Doc Quiles ([id number]) Female 1995 Discharge Information Admitting Provider Service Area Unit Jasper Erwin MD / Arlen Irizarry 92 [facility] 2 Labor & Delivery / [phone] Discharge Provider Discharge Date/Time Discharge Disposition Destination (none) 2017 (Pending) AHR (none) Patient Language Language ENGLISH [13] Hospital Problems as of 2017  Reviewed: 2017  2:55 PM by Jasper Erwin MD  
  
  
  
 Class Noted - Resolved Last Modified POA Active Problems Pregnancy  2017 - Present 2017 by Jasper Erwin MD Unknown Entered by Skyla Hinds MD  
  
Non-Hospital Problems as of 2017  Reviewed: 2017  2:55 PM by Jasper Erwin MD  
  
  
  
 Class Noted - Resolved Last Modified Active Problems Supervision of normal first pregnancy  2017 - Present 9/15/2017 by Petra Sepulveda Entered by Jasper Erwin MD  
  Overview Addendum 9/15/2017  9:25 AM by Petra Sepulveda 30 week transfer IUGR 7%: Recommendations: 
-Follow-up BPP and Doppler studies on 17. 
-Delivery at 37 weeks if Doppler shows increased S/D ratio. -Delivery at diagnosis if Doppler shows absent end-diastolic flow. IOL 9/22/17  FB 9/21. Pt aware. You are allergic to the following Allergen Reactions Ibuprofen Itching Tylenol (Acetaminophen) Unknown (comments) Stuffy nose and scratchy throat Current Discharge Medication List  
  
START taking these medications Dose & Instructions Dispensing Information Comments HYDROmorphone 2 mg tablet Commonly known as:  DILAUDID Dose:  2 mg Take 1 Tab by mouth every four (4) hours as needed. Max Daily Amount: 12 mg. Quantity:  15 Tab Refills:  0  
   
 labetalol 100 mg tablet Commonly known as:  Garnette Gal Dose:  100 mg Take 1 Tab by mouth every twelve (12) hours. Quantity:  60 Tab Refills:  1 ASK your doctor about these medications Dose & Instructions Dispensing Information Comments HYDROcodone-acetaminophen 5-325 mg per tablet Commonly known as:  Nena Venancio Dose:  1-2 Tab Take 1-2 Tabs by mouth every four (4) hours as needed for Pain. Max Daily Amount: 12 Tabs. Quantity:  13 Tab Refills:  0 Surgery Information ID Date/Time Status Primary Surgeon All Procedures Location [id number] 9/22/2017 Complete   EL [facility] - DO NOT SCHEDULE Follow-up Information Follow up With Details Comments Contact Info Sayda Weldon MD In 4 days Post-Partum BP Check [address] [address] 70 [facility] 
[phone] Sayda Weldon MD In 6 weeks [address] 
[phone] Discharge Instructions POST DELIVERY DISCHARGE INSTRUCTIONS Name: Judith Snow YOB: 1995 Primary Diagnosis: Active Problems: * No active hospital problems. * General:  
 
Diet/Diet Restrictions: 
Eight 8-ounce glasses of fluid daily (water, juices); avoid excessive caffeine intake. Meals/snacks as desired which are high in fiber and carbohydrates and low in fat and cholesterol. Medications:  
{Medication reconciliation information is now added to the patient's AVS automatically when it is printed. There is no need to use this SmartLink in discharge instructions. Highlight this text and delete it to clear this message} Physical Activity / Restrictions / Safety:  
 
Avoid heavy lifting, no more that 8 lbs. For 2-3 weeks; No driving while taking narcotic pain medication. Post  patients should not drive until pain free. No intercourse 4-6 weeks, no douching or tampon use. May resume exercise in 6 weeks. Discharge Instructions/Special Treatment/Home Care Needs:  
 
Continue prenatal vitamins. Continue to use squirt bottle with warm water on your episiotomy after each bathroom use until bleeding stops. If steri-strips applied to your incision, remove in 7 days. Take stool softeners daily. Call your doctor for the following:  
 
Fever over 101 degrees by mouth. Vaginal bleeding heavier than a normal menstrual period or lost larger than a golf ball. Red streaks or increased swelling of legs, painful red streaks on your breast. 
Painful urination, or increased pain, redness or discharge with your incision. Pain Management:  
 
Pain Management:  
Take prescribed medication as directed for pain. Use a warm Sitz bath 3 times daily to relieve episiotomy or hemorrhoidal discomfort. For hemorrhoidal discomfort, use Tucks and Anusol cream as needed and directed. Follow-Up Care:  
 
Appointment with MD: Follow-up Appointments Procedures  FOLLOW UP VISIT Appointment in: 6 Weeks Standing Status:   Standing Number of Occurrences:   1 Order Specific Question:   Appointment in Answer:   6 Weeks Telephone number: [phone] Signed By: Kodi Russ MD Date: 2017 Time: 3:36 PM 
 
 
 
 
  
After Your Delivery (the Postpartum Period): Care Instructions Your Care Instructions Congratulations on the birth of your baby. Like pregnancy, the  period can be a time of excitement, per, and exhaustion. You may look at your wondrous little baby and feel happy. You may also be overwhelmed by your new sleep hours and new responsibilities. At first, babies often sleep during the days and are awake at night. They do not have a pattern or routine. They may make sudden gasps, jerk themselves awake, or look like they have crossed eyes. These are all normal, and they may even make you smile. In these first weeks after delivery, try to take good care of yourself. It may take 4 to 6 weeks to feel like yourself again, and possibly longer if you had a  birth. You will likely feel very tired for several weeks. Your days will be full of ups and downs, but lots of per as well. Follow-up care is a key part of your treatment and safety. Be sure to make and go to all appointments, and call your doctor if you are having problems. It's also a good idea to know your test results and keep a list of the medicines you take. How can you care for yourself at home? Take care of your body after delivery · Use pads instead of tampons for the bloody flow that may last as long as 2 weeks. · Ease soreness of hemorrhoids and the area between your vagina and rectum with ice compresses or witch hazel pads. · Ease constipation by drinking lots of fluid and eating high-fiber foods. Ask your doctor about over-the-counter stool softeners. · Cleanse yourself with a gentle squeeze of warm water from a bottle instead of wiping with toilet paper. · Take a sitz bath in warm water several times a day. · Wear a good nursing bra. Ease sore and swollen breasts with warm, wet washcloths. · If you are not breastfeeding, use ice rather than heat for breast soreness. · Your period may not start for several months if you are breastfeeding. You may bleed more, and longer at first, than you did before you got pregnant. · Wait until you are healed (about 4 to 6 weeks) before you have sexual intercourse. Your doctor will tell you when it is okay to have sex. · Try not to travel with your baby for 5 or 6 weeks. If you take a long car trip, make frequent stops to walk around and stretch. Avoid exhaustion · Rest every day. Try to nap when your baby naps. · Ask another adult to be with you for a few days after delivery. · Plan for  if you have other children. · Stay flexible so you can eat at odd hours and sleep when you need to. Both you and your baby are making new schedules. · Plan small trips to get out of the house. Change can make you feel less tired. · Ask for help with housework, cooking, and shopping. Remind yourself that your job is to care for your baby. Know about help for postpartum depression · \"Baby blues\" are common for the first 1 to 2 weeks after birth. You may cry or feel sad or irritable for no reason. · Rest whenever you can. Being tired makes it harder to handle your emotions. · Go for walks with your baby. · Talk to your partner, friends, and family about your feelings. · If your symptoms last for more than a few weeks, or if you feel very depressed, ask your doctor for help. · Postpartum depression can be treated. Support groups and counseling can help. Sometimes medicine can also help. Stay healthy · Eat healthy foods so you have more energy, make good breast milk, and lose extra baby pounds. · If you breastfeed, avoid alcohol and drugs. Stay smoke-free. If you quit during pregnancy, congratulations. · Start daily exercise after 4 to 6 weeks, but rest when you feel tired. · Learn exercises to tone your belly.  Do Kegel exercises to regain strength in your pelvic muscles. You can do these exercises while you stand or sit. ¨ Squeeze the same muscles you would use to stop your urine. Your belly and thighs should not move. ¨ Hold the squeeze for 3 seconds, and then relax for 3 seconds. ¨ Start with 3 seconds. Then add 1 second each week until you are able to squeeze for 10 seconds. ¨ Repeat the exercise 10 to 15 times for each session. Do three or more sessions each day. · Find a class for new mothers and new babies that has an exercise time. · If you had a  birth, give yourself a bit more time before you exercise, and be careful. When should you call for help? Call 911 anytime you think you may need emergency care. For example, call if: 
· You passed out (lost consciousness). Call your doctor now or seek immediate medical care if: 
· You have severe vaginal bleeding. This means you are passing blood clots and soaking through a pad each hour for 2 or more hours. · You are dizzy or lightheaded, or you feel like you may faint. · You have a fever. · You have new belly pain, or your pain gets worse. Watch closely for changes in your health, and be sure to contact your doctor if: 
· Your vaginal bleeding seems to be getting heavier. · You have new or worse vaginal discharge. · You feel sad, anxious, or hopeless for more than a few days. · You do not get better as expected. Where can you learn more? Go to http://kirby-annalee.info/. Enter A461 in the search box to learn more about \"After Your Delivery (the Postpartum Period): Care Instructions. \" Current as of: 2017 Content Version: 11.3 © 3440-6980 [employer]. Care instructions adapted under license by [employer] (which disclaims liability or warranty for this information).  If you have questions about a medical condition or this instruction, always ask your healthcare professional. [employer] disclaims any warranty or liability for your use of this information. Chart Review Routing History Recipient Method Report Sent By Ivey Rubinstein Tatiana Masters, MD  
Phone: [phone] In [facility] CUSTOM LAB REPORT Etta Phillips [[id number]] 7/31/2017  3:39 PM 7/31/2017

## 2017-09-21 NOTE — MR AVS SNAPSHOT
Visit Information Date & Time Provider Department Dept. Phone Encounter #  
 9/21/2017  2:50 PM MD Андрей Núñez [phone] [id number] Your Appointments 9/21/2017  2:50 PM  
OB VISIT with MD Андрей Núñez ([facility]) Appt Note: miranda  FW  
 [address] [facility] [address]  
[phone]  
  
   
 [address] [address]  
  
    
 9/22/2017  7:00 AM  
PROCEDURE with MD Андрей Núñez ([facility]) Appt Note: IOL (Miranda) [address] [place] [address]  
[address] Upcoming Health Maintenance Date Due  
 HPV AGE 9Y-34Y (1 of 3 - Female 3 Dose Series) 5/15/2007 PAP AKA CERVICAL CYTOLOGY 5/15/2017 OB 3RD TRIMESTER TDAP 7/13/2017 INFLUENZA AGE 9 TO ADULT 8/1/2017 Allergies as of 9/21/2017  Review Complete On: 9/21/2017 By: Lexa Taylor RN Severity Noted Reaction Type Reactions Ibuprofen  03/04/2015    Itching Tylenol [Acetaminophen]  08/07/2017    Unknown (comments) Current Immunizations  Never Reviewed No immunizations on file. Not reviewed this visit Vitals BP Weight(growth percentile) BMI OB Status Smoking Status 128/76 266 lb 9.6 oz (120.9 kg) 45.76 kg/m2 Pregnant Never Smoker BMI and BSA Data Body Mass Index Body Surface Area 45.76 kg/m 2 2.34 m 2 Your Updated Medication List  
  
   
This list is accurate as of: 9/21/17  2:39 PM.  Always use your most recent med list.  
  
  
  
  
 HYDROcodone-acetaminophen 5-325 mg per tablet Commonly known as:  Mariaelena Francisco Take 1-2 Tabs by mouth every four (4) hours as needed for Pain. Max Daily Amount: 12 Tabs.   
  
  
  
  
To-Do List   
 09/21/2017 4:30 PM  
 Appointment with L&D INDUCTION RESOURCE [facility] at [facility] 2 L&D PROCEDURE ([phone]) Patient Instructions Week 37 of Your Pregnancy: Care Instructions Your Care Instructions You are near the end of your pregnancyand you're probably pretty uncomfortable. It may be harder to walk around. Lying down probably isn't comfortable either. You may have trouble getting to sleep or staying asleep. Most women deliver their babies between 40 and 41 weeks. This is a good time to think about packing a bag for the hospital with items you'll need. Then you'll be ready when labor starts. Follow-up care is a key part of your treatment and safety. Be sure to make and go to all appointments, and call your doctor if you are having problems. It's also a good idea to know your test results and keep a list of the medicines you take. How can you care for yourself at home? Learn about breastfeeding · Breastfeeding is best for your baby and good for you. · Breast milk has antibodies to help your baby fight infections. · Mothers who breastfeed often lose weight faster, because making milk burns calories. · Learning the best ways to hold your baby will make breastfeeding easier. · Let your partner bathe and diaper the baby to keep your partner from feeling left out. Snuggle together when you breastfeed. · You may want to learn how to use a breast pump and store your milk. · If you choose to bottle feed, make the feeding feel like breastfeeding so you can bond with your baby. Always hold your baby and the bottle. Do not prop bottles or let your baby fall asleep with a bottle. Learn about crying · It is common for babies to cry for 1 to 3 hours a day. Some cry more, some cry less. · Babies don't cry to make you upset or because you are a bad parent. · Crying is how your baby communicates. Your baby may be hungry; have gas; need a diaper change; or feel cold, warm, tired, lonely, or tense. Sometimes babies cry for unknown reasons. · If you respond to your baby's needs, he or she will learn to trust you. · Try to stay calm when your baby cries. Your baby may get more upset if he or she senses that you are upset. Know how to care for your  · Your baby's umbilical cord stump will drop off on its own, usually between 1 and 2 weeks. To care for your baby's umbilical cord area: ¨ Clean the area at the bottom of the cord 2 or 3 times a day. ¨ Pay special attention to the area where the cord attaches to the skin. ¨ Keep the diaper folded below the cord. ¨ Use a damp washcloth or cotton ball to sponge bathe your baby until the stump has come off. · Your baby's first dark stool is called meconium. After the meconium is passed, your baby will develop his or her own bowel pattern. ¨ Some babies, especially  babies, have several bowel movements a day. Others have one or two a day, or one every 2 to 3 days. ¨  babies often have loose, yellow stools. Formula-fed babies have more formed stools. ¨ If your baby's stools look like little pellets, he or she is constipated. After 2 days of constipation, call your baby's doctor. · If your baby will be circumcised, you can care for him at home. ¨ Gently rinse his penis with warm water after every diaper change. Do not try to remove the film that forms on the penis. This film will go away on its own. Pat dry. ¨ Put petroleum ointment, such as Vaseline, on the area of the diaper that will touch your baby's penis. This will keep the diaper from sticking to your baby. ¨ Ask the doctor about giving your baby acetaminophen (Tylenol) for pain. Where can you learn more? Go to http://kirby-annalee.info/. Enter 69  97 in the search box to learn more about \"Week 37 of Your Pregnancy: Care Instructions. \" Current as of: 2017 Content Version: 11.3 © 6116-5809 [employer]. Care instructions adapted under license by [employer] (which disclaims liability or warranty for this information). If you have questions about a medical condition or this instruction, always ask your healthcare professional. [address] any warranty or liability for your use of this information. Introducing [facility]! Lorin Fong introduces YouFig patient portal. Now you can access parts of your medical record, email your doctor's office, and request medication refills online. 1. In your internet browser, go to [email or web address] 2. Click on the First Time User? Click Here link in the Sign In box. You will see the New Member Sign Up page. 3. Enter your YouFig Access Code exactly as it appears below. You will not need to use this code after youve completed the sign-up process. If you do not sign up before the expiration date, you must request a new code. · YouFig Access Code: [email or web address] Expires: 11/5/2017  2:26 PM 
 
4. Enter the last four digits of your Social Security Number (xxxx) and Date of Birth (mm/dd/yyyy) as indicated and click Submit. You will be taken to the next sign-up page. 5. Create a YouFig ID. This will be your YouFig login ID and cannot be changed, so think of one that is secure and easy to remember. 6. Create a YouFig password. You can change your password at any time. 7. Enter your Password Reset Question and Answer. This can be used at a later time if you forget your password. 8. Enter your e-mail address. You will receive e-mail notification when new information is available in [address] 9. Click Sign Up. You can now view and download portions of your medical record. 10. Click the Download Summary menu link to download a portable copy of your medical information.  
 
If you have questions, please visit the Frequently Asked Questions section of the Dovo. Remember, Pigeonlyt is NOT to be used for urgent needs. For medical emergencies, dial 911. Now available from your iPhone and Android! Please provide this summary of care documentation to your next provider. Your primary care clinician is listed as NONE. If you have any questions after today's visit, please call [phone].

## 2017-09-21 NOTE — PROGRESS NOTES
g1 po at 37 weeks iup from dr Foley Fat office for induction of labor for iugr with miranda bulb in place    1524: efm applied, sitting up in bed.    1615: blood pressure elevated, iv started, labs from lac x1 attempt, repositioned from sitting mid fowlers to tilted r, low fowlers    1630:  Call to hospitalist dr iesha raymond, phone report on elevated b/p, miranda bulb in place, iv and labs. rbto to give iv bolus    1740: repositioned to far r, low fowlers    1755: call to dr Jonnie Maxwell, phone report on b/p, repositioned to r lateral side, and b/p after repositioning, pain scale  rbto received. 1835:  Drowsy, voided on bedpan.

## 2017-09-21 NOTE — PROGRESS NOTES
09/21/17    7:05 PM  Bedside and Verbal shift change report given to [facility] (oncoming nurse) by Raymond Coronado RN (offgoing nurse). Report given with SBAR, Kardex, Intake/Output, MAR, Recent Results and Med Rec Status. Assumed care of pt. Introduced self as Primary RN. Bed locked and in low position with call bell within reach. Plan discussed with pt and understanding was verbalized. Pt denies additional complaints at this time. White board updated with this nurse's name. Patient advised that medical information will be discussed and it is their own reasonability to tell nurse if such conversation should not take place in the presence of visitors. Pt verbalizes understanding. Maite Berger RN    7:25 PM  Prenatal labs reviewed with Derik Ramesh RN. Patient was a transfer from [place] in August. POSITIVE chlamydia test on 1-26-17 and repeat was NEGATIVE on 4-6-17.     7:46 PM  Patient called out with reports of lower abdominal cramping. Informed patient that this cramping is normal and provided education on the induction and labor process along with appropriate  pain control. Patient verbalized understanding. Discussed with Dr Albertina Agrawal and agrees with plan of care. Verbal order for Ambien 10 mg at bedside. 8:32 PM  Spoke to Dr Albertina Agrawal about continuous monitoring throughout the night. Verbal order received for patient to come off monitor. Tracing reviewed by Dr Albertina Agrawal. 4:30 AM  Velazquez bulb removed at this time. 7:15 AM  Bedside shift change report given to Alexis VANG (oncoming nurse) by Tobe Homans RN (offgoing nurse). Report included the following information SBAR, Kardex, Intake/Output, MAR, Recent Results and Med Rec Status.

## 2017-09-21 NOTE — IP AVS SNAPSHOT
[address] [facility] 
 
 
 [place] [address] [phone] Patient: Paul Bartholomew MRN: [id number] :1995 You are allergic to the following Allergen Reactions Ibuprofen Itching Tylenol (Acetaminophen) Unknown (comments) Stuffy nose and scratchy throat Recent Documentation Height Weight Breastfeeding? BMI OB Status Smoking Status 1.6 m 119.2 kg Unknown 46.55 kg/m2 Recent pregnancy Never Smoker Unresulted Labs Order Current Status SAMPLE TO BLOOD BANK In process Emergency Contacts Name Discharge Info Relation Home Work Mobile Leah Jeff DISCHARGE CAREGIVER [3] Mother [14]   [phone] About your hospitalization You were admitted on:  2017 You last received care in the:  [facility] 2 LABOR & DELIVERY You were discharged on:  2017 Unit phone number:  [phone] Why you were hospitalized Your primary diagnosis was:  Not on File Your diagnoses also included:  Pregnancy Providers Seen During Your Hospitalizations Provider Role Specialty Primary office phone Lilliana Kyle MD Attending Provider Obstetrics & Gynecology [phone] Your Primary Care Physician (PCP) Primary Care Physician Office Phone Office Fax NONE ** None ** ** None ** Follow-up Information Follow up With Details Comments Contact Info Lilliana Kyle MD In 4 days Post-Partum BP Check [place] [address] [place] [address] 
[phone] Lilliana Kyle MD In 6 weeks 1 [facility] [place] [address] [phone] Current Discharge Medication List  
  
START taking these medications Dose & Instructions Dispensing Information Comments Morning Noon Evening Bedtime HYDROmorphone 2 mg tablet Commonly known as:  DILAUDID  
   
 Your last dose was: Your next dose is:    
   
   
 Dose:  2 mg Take 1 Tab by mouth every four (4) hours as needed. Max Daily Amount: 12 mg. Quantity:  15 Tab Refills:  0  
     
   
   
   
  
 labetalol 100 mg tablet Commonly known as:  Cyndia Bacca Your last dose was: Your next dose is:    
   
   
 Dose:  100 mg Take 1 Tab by mouth every twelve (12) hours. Quantity:  60 Tab Refills:  1 ASK your doctor about these medications Dose & Instructions Dispensing Information Comments Morning Noon Evening Bedtime HYDROcodone-acetaminophen 5-325 mg per tablet Commonly known as:  Ary Plum Your last dose was: Your next dose is:    
   
   
 Dose:  1-2 Tab Take 1-2 Tabs by mouth every four (4) hours as needed for Pain. Max Daily Amount: 12 Tabs. Quantity:  13 Tab Refills:  0 Where to Get Your Medications Information on where to get these meds will be given to you by the nurse or doctor. ! Ask your nurse or doctor about these medications HYDROmorphone 2 mg tablet  
 labetalol 100 mg tablet Discharge Instructions POST DELIVERY DISCHARGE INSTRUCTIONS Name: Oswaldo Aase YOB: 1995 Primary Diagnosis: Active Problems: * No active hospital problems. * General:  
 
Diet/Diet Restrictions: 
Eight 8-ounce glasses of fluid daily (water, juices); avoid excessive caffeine intake. Meals/snacks as desired which are high in fiber and carbohydrates and low in fat and cholesterol. Medications:  
{Medication reconciliation information is now added to the patient's AVS automatically when it is printed. There is no need to use this SmartLink in discharge instructions. Highlight this text and delete it to clear this message} Physical Activity / Restrictions / Safety:  
 
Avoid heavy lifting, no more that 8 lbs. For 2-3 weeks;  No driving while taking narcotic pain medication. Post  patients should not drive until pain free. No intercourse 4-6 weeks, no douching or tampon use. May resume exercise in 6 weeks. Discharge Instructions/Special Treatment/Home Care Needs:  
 
Continue prenatal vitamins. Continue to use squirt bottle with warm water on your episiotomy after each bathroom use until bleeding stops. If steri-strips applied to your incision, remove in 7 days. Take stool softeners daily. Call your doctor for the following:  
 
Fever over 101 degrees by mouth. Vaginal bleeding heavier than a normal menstrual period or lost larger than a golf ball. Red streaks or increased swelling of legs, painful red streaks on your breast. 
Painful urination, or increased pain, redness or discharge with your incision. Pain Management:  
 
Pain Management:  
Take prescribed medication as directed for pain. Use a warm Sitz bath 3 times daily to relieve episiotomy or hemorrhoidal discomfort. For hemorrhoidal discomfort, use Tucks and Anusol cream as needed and directed. Follow-Up Care:  
 
Appointment with MD: Follow-up Appointments Procedures  FOLLOW UP VISIT Appointment in: 6 Weeks Standing Status:   Standing Number of Occurrences:   1 Order Specific Question:   Appointment in Answer:   6 Weeks Telephone number: [phone] Signed By: Sayda Weldon MD                                                                                                   Date: 2017 Time: 3:36 PM 
 
 
 
 
  
After Your Delivery (the Postpartum Period): Care Instructions Your Care Instructions Congratulations on the birth of your baby. Like pregnancy, the  period can be a time of excitement, per, and exhaustion. You may look at your wondrous little baby and feel happy. You may also be overwhelmed by your new sleep hours and new responsibilities. At first, babies often sleep during the days and are awake at night.  They do not have a pattern or routine. They may make sudden gasps, jerk themselves awake, or look like they have crossed eyes. These are all normal, and they may even make you smile. In these first weeks after delivery, try to take good care of yourself. It may take 4 to 6 weeks to feel like yourself again, and possibly longer if you had a  birth. You will likely feel very tired for several weeks. Your days will be full of ups and downs, but lots of per as well. Follow-up care is a key part of your treatment and safety. Be sure to make and go to all appointments, and call your doctor if you are having problems. It's also a good idea to know your test results and keep a list of the medicines you take. How can you care for yourself at home? Take care of your body after delivery · Use pads instead of tampons for the bloody flow that may last as long as 2 weeks. · Ease soreness of hemorrhoids and the area between your vagina and rectum with ice compresses or witch hazel pads. · Ease constipation by drinking lots of fluid and eating high-fiber foods. Ask your doctor about over-the-counter stool softeners. · Cleanse yourself with a gentle squeeze of warm water from a bottle instead of wiping with toilet paper. · Take a sitz bath in warm water several times a day. · Wear a good nursing bra. Ease sore and swollen breasts with warm, wet washcloths. · If you are not breastfeeding, use ice rather than heat for breast soreness. · Your period may not start for several months if you are breastfeeding. You may bleed more, and longer at first, than you did before you got pregnant. · Wait until you are healed (about 4 to 6 weeks) before you have sexual intercourse. Your doctor will tell you when it is okay to have sex. · Try not to travel with your baby for 5 or 6 weeks. If you take a long car trip, make frequent stops to walk around and stretch. Avoid exhaustion · Rest every day. Try to nap when your baby naps. · Ask another adult to be with you for a few days after delivery. · Plan for  if you have other children. · Stay flexible so you can eat at odd hours and sleep when you need to. Both you and your baby are making new schedules. · Plan small trips to get out of the house. Change can make you feel less tired. · Ask for help with housework, cooking, and shopping. Remind yourself that your job is to care for your baby. Know about help for postpartum depression · \"Baby blues\" are common for the first 1 to 2 weeks after birth. You may cry or feel sad or irritable for no reason. · Rest whenever you can. Being tired makes it harder to handle your emotions. · Go for walks with your baby. · Talk to your partner, friends, and family about your feelings. · If your symptoms last for more than a few weeks, or if you feel very depressed, ask your doctor for help. · Postpartum depression can be treated. Support groups and counseling can help. Sometimes medicine can also help. Stay healthy · Eat healthy foods so you have more energy, make good breast milk, and lose extra baby pounds. · If you breastfeed, avoid alcohol and drugs. Stay smoke-free. If you quit during pregnancy, congratulations. · Start daily exercise after 4 to 6 weeks, but rest when you feel tired. · Learn exercises to tone your belly. Do Kegel exercises to regain strength in your pelvic muscles. You can do these exercises while you stand or sit. ¨ Squeeze the same muscles you would use to stop your urine. Your belly and thighs should not move. ¨ Hold the squeeze for 3 seconds, and then relax for 3 seconds. ¨ Start with 3 seconds. Then add 1 second each week until you are able to squeeze for 10 seconds. ¨ Repeat the exercise 10 to 15 times for each session. Do three or more sessions each day. · Find a class for new mothers and new babies that has an exercise time.  
· If you had a  birth, give yourself a bit more time before you exercise, and be careful. When should you call for help? Call 911 anytime you think you may need emergency care. For example, call if: 
· You passed out (lost consciousness). Call your doctor now or seek immediate medical care if: 
· You have severe vaginal bleeding. This means you are passing blood clots and soaking through a pad each hour for 2 or more hours. · You are dizzy or lightheaded, or you feel like you may faint. · You have a fever. · You have new belly pain, or your pain gets worse. Watch closely for changes in your health, and be sure to contact your doctor if: 
· Your vaginal bleeding seems to be getting heavier. · You have new or worse vaginal discharge. · You feel sad, anxious, or hopeless for more than a few days. · You do not get better as expected. Where can you learn more? Go to http://kirby-annalee.info/. Enter A461 in the search box to learn more about \"After Your Delivery (the Postpartum Period): Care Instructions. \" Current as of: March 16, 2017 Content Version: 11.3 © 0503-2288 [employer]. Care instructions adapted under license by [employer] (which disclaims liability or warranty for this information). If you have questions about a medical condition or this instruction, always ask your healthcare professional. [address] any warranty or liability for your use of this information. Discharge Orders None Introducing [facility]! Tamera Mckeon introduces [employer] patient portal. Now you can access parts of your medical record, email your doctor's office, and request medication refills online. 1. In your internet browser, go to [email or web address] 2. Click on the First Time User? Click Here link in the Sign In box. You will see the New Member Sign Up page. 3. Enter your [employer] Access Code exactly as it appears below.  You will not need to use this code after youve completed the sign-up process. If you do not sign up before the expiration date, you must request a new code. · ConnectE[employer] Access Code: [email or web address] Expires: 11/5/2017  2:26 PM 
 
4. Enter the last four digits of your Social Security Number (xxxx) and Date of Birth (mm/dd/yyyy) as indicated and click Submit. You will be taken to the next sign-up page. 5. Create a ConnectEdu ID. This will be your ConnectEdu login ID and cannot be changed, so think of one that is secure and easy to remember. 6. Create a [employer]Edu password. You can change your password at any time. 7. Enter your Password Reset Question and Answer. This can be used at a later time if you forget your password. 8. Enter your e-mail address. You will receive e-mail notification when new information is available in [address] 9. Click Sign Up. You can now view and download portions of your medical record. 10. Click the Download Summary menu link to download a portable copy of your medical information. If you have questions, please visit the Frequently Asked Questions section of the Connect[employer] website. Remember, [employer] is NOT to be used for urgent needs. For medical emergencies, dial 911. Now available from your iPhone and Android! General Information Please provide this summary of care documentation to your next provider. Patient Signature:  ____________________________________________________________ Date:  ____________________________________________________________  
  
[place] Provider Signature:  ____________________________________________________________ Date:  ____________________________________________________________

## 2017-09-22 ENCOUNTER — ANESTHESIA EVENT (OUTPATIENT)
Dept: LABOR AND DELIVERY | Age: 22
DRG: 560 | End: 2017-09-22
Payer: COMMERCIAL

## 2017-09-22 ENCOUNTER — ANESTHESIA (OUTPATIENT)
Dept: LABOR AND DELIVERY | Age: 22
DRG: 560 | End: 2017-09-22
Payer: COMMERCIAL

## 2017-09-22 LAB
ALBUMIN SERPL-MCNC: 2.6 G/DL (ref 3.5–5)
ALBUMIN/GLOB SERPL: 0.7 {RATIO} (ref 1.1–2.2)
ALP SERPL-CCNC: 126 U/L (ref 45–117)
ALT SERPL-CCNC: 13 U/L (ref 12–78)
ANION GAP SERPL CALC-SCNC: 11 MMOL/L (ref 5–15)
AST SERPL-CCNC: 9 U/L (ref 15–37)
BASOPHILS # BLD: 0 K/UL (ref 0–0.1)
BASOPHILS NFR BLD: 0 % (ref 0–1)
BILIRUB SERPL-MCNC: 0.4 MG/DL (ref 0.2–1)
BUN SERPL-MCNC: 7 MG/DL (ref 6–20)
BUN/CREAT SERPL: 8 (ref 12–20)
CALCIUM SERPL-MCNC: 8.5 MG/DL (ref 8.5–10.1)
CHLORIDE SERPL-SCNC: 104 MMOL/L (ref 97–108)
CO2 SERPL-SCNC: 23 MMOL/L (ref 21–32)
CREAT SERPL-MCNC: 0.85 MG/DL (ref 0.55–1.02)
CREAT UR-MCNC: 181.12 MG/DL
EOSINOPHIL # BLD: 0 K/UL (ref 0–0.4)
EOSINOPHIL NFR BLD: 0 % (ref 0–7)
ERYTHROCYTE [DISTWIDTH] IN BLOOD BY AUTOMATED COUNT: 13.9 % (ref 11.5–14.5)
GLOBULIN SER CALC-MCNC: 3.7 G/DL (ref 2–4)
GLUCOSE SERPL-MCNC: 98 MG/DL (ref 65–100)
HCT VFR BLD AUTO: 38.1 % (ref 35–47)
HGB BLD-MCNC: 12.7 G/DL (ref 11.5–16)
LYMPHOCYTES # BLD: 1.3 K/UL (ref 0.8–3.5)
LYMPHOCYTES NFR BLD: 13 % (ref 12–49)
MCH RBC QN AUTO: 30.5 PG (ref 26–34)
MCHC RBC AUTO-ENTMCNC: 33.3 G/DL (ref 30–36.5)
MCV RBC AUTO: 91.4 FL (ref 80–99)
MONOCYTES # BLD: 0.6 K/UL (ref 0–1)
MONOCYTES NFR BLD: 6 % (ref 5–13)
NEUTS SEG # BLD: 7.9 K/UL (ref 1.8–8)
NEUTS SEG NFR BLD: 81 % (ref 32–75)
PLATELET # BLD AUTO: 234 K/UL (ref 150–400)
POTASSIUM SERPL-SCNC: 4 MMOL/L (ref 3.5–5.1)
PROT SERPL-MCNC: 6.3 G/DL (ref 6.4–8.2)
PROT UR-MCNC: 126 MG/DL (ref 0–11.9)
PROT/CREAT UR-RTO: 0.7
RBC # BLD AUTO: 4.17 M/UL (ref 3.8–5.2)
SODIUM SERPL-SCNC: 138 MMOL/L (ref 136–145)
WBC # BLD AUTO: 9.8 K/UL (ref 3.6–11)

## 2017-09-22 PROCEDURE — 84156 ASSAY OF PROTEIN URINE: CPT | Performed by: OBSTETRICS & GYNECOLOGY

## 2017-09-22 PROCEDURE — 77030018749 HC HK AMNIO DISP DERY -A

## 2017-09-22 PROCEDURE — 80053 COMPREHEN METABOLIC PANEL: CPT | Performed by: OBSTETRICS & GYNECOLOGY

## 2017-09-22 PROCEDURE — 0HQ9XZZ REPAIR PERINEUM SKIN, EXTERNAL APPROACH: ICD-10-PCS | Performed by: OBSTETRICS & GYNECOLOGY

## 2017-09-22 PROCEDURE — 36415 COLL VENOUS BLD VENIPUNCTURE: CPT | Performed by: OBSTETRICS & GYNECOLOGY

## 2017-09-22 PROCEDURE — 74011250636 HC RX REV CODE- 250/636: Performed by: OBSTETRICS & GYNECOLOGY

## 2017-09-22 PROCEDURE — 00HU33Z INSERTION OF INFUSION DEVICE INTO SPINAL CANAL, PERCUTANEOUS APPROACH: ICD-10-PCS | Performed by: ANESTHESIOLOGY

## 2017-09-22 PROCEDURE — 74011000250 HC RX REV CODE- 250

## 2017-09-22 PROCEDURE — 77030014125 HC TY EPDRL BBMI -B: Performed by: ANESTHESIOLOGY

## 2017-09-22 PROCEDURE — 75410000002 HC LABOR FEE PER 1 HR: Performed by: OBSTETRICS & GYNECOLOGY

## 2017-09-22 PROCEDURE — 77030034850

## 2017-09-22 PROCEDURE — 85025 COMPLETE CBC W/AUTO DIFF WBC: CPT | Performed by: OBSTETRICS & GYNECOLOGY

## 2017-09-22 PROCEDURE — 74011250636 HC RX REV CODE- 250/636: Performed by: ANESTHESIOLOGY

## 2017-09-22 PROCEDURE — 65270000029 HC RM PRIVATE

## 2017-09-22 RX ORDER — ONDANSETRON 2 MG/ML
4 INJECTION INTRAMUSCULAR; INTRAVENOUS
Status: DISCONTINUED | OUTPATIENT
Start: 2017-09-22 | End: 2017-09-24 | Stop reason: HOSPADM

## 2017-09-22 RX ORDER — LABETALOL HYDROCHLORIDE 5 MG/ML
20 INJECTION, SOLUTION INTRAVENOUS
Status: COMPLETED | OUTPATIENT
Start: 2017-09-22 | End: 2017-09-22

## 2017-09-22 RX ORDER — DIPHENHYDRAMINE HCL 25 MG
25 CAPSULE ORAL
Status: DISCONTINUED | OUTPATIENT
Start: 2017-09-22 | End: 2017-09-24 | Stop reason: HOSPADM

## 2017-09-22 RX ORDER — BUPIVACAINE HYDROCHLORIDE 2.5 MG/ML
INJECTION, SOLUTION EPIDURAL; INFILTRATION; INTRACAUDAL AS NEEDED
Status: DISCONTINUED | OUTPATIENT
Start: 2017-09-22 | End: 2017-09-22 | Stop reason: HOSPADM

## 2017-09-22 RX ORDER — SIMETHICONE 80 MG
80 TABLET,CHEWABLE ORAL
Status: DISCONTINUED | OUTPATIENT
Start: 2017-09-22 | End: 2017-09-24 | Stop reason: HOSPADM

## 2017-09-22 RX ORDER — LABETALOL HYDROCHLORIDE 5 MG/ML
INJECTION, SOLUTION INTRAVENOUS
Status: COMPLETED
Start: 2017-09-22 | End: 2017-09-22

## 2017-09-22 RX ORDER — MAGNESIUM SULFATE HEPTAHYDRATE 40 MG/ML
4 INJECTION, SOLUTION INTRAVENOUS ONCE
Status: COMPLETED | OUTPATIENT
Start: 2017-09-22 | End: 2017-09-23

## 2017-09-22 RX ORDER — ZOLPIDEM TARTRATE 5 MG/1
5 TABLET ORAL
Status: DISCONTINUED | OUTPATIENT
Start: 2017-09-22 | End: 2017-09-24 | Stop reason: HOSPADM

## 2017-09-22 RX ORDER — NALOXONE HYDROCHLORIDE 0.4 MG/ML
0.4 INJECTION, SOLUTION INTRAMUSCULAR; INTRAVENOUS; SUBCUTANEOUS AS NEEDED
Status: DISCONTINUED | OUTPATIENT
Start: 2017-09-22 | End: 2017-09-23 | Stop reason: SDUPTHER

## 2017-09-22 RX ORDER — LIDOCAINE HYDROCHLORIDE 10 MG/ML
INJECTION INFILTRATION; PERINEURAL
Status: DISCONTINUED
Start: 2017-09-22 | End: 2017-09-22 | Stop reason: CLARIF

## 2017-09-22 RX ORDER — LIDOCAINE HYDROCHLORIDE AND EPINEPHRINE 20; 5 MG/ML; UG/ML
INJECTION, SOLUTION EPIDURAL; INFILTRATION; INTRACAUDAL; PERINEURAL AS NEEDED
Status: DISCONTINUED | OUTPATIENT
Start: 2017-09-22 | End: 2017-09-22 | Stop reason: HOSPADM

## 2017-09-22 RX ORDER — HYDROMORPHONE HYDROCHLORIDE 2 MG/1
2 TABLET ORAL
Status: DISCONTINUED | OUTPATIENT
Start: 2017-09-22 | End: 2017-09-24 | Stop reason: HOSPADM

## 2017-09-22 RX ORDER — SODIUM CHLORIDE 0.9 % (FLUSH) 0.9 %
5-10 SYRINGE (ML) INJECTION EVERY 8 HOURS
Status: DISCONTINUED | OUTPATIENT
Start: 2017-09-22 | End: 2017-09-24 | Stop reason: HOSPADM

## 2017-09-22 RX ORDER — DOCUSATE SODIUM 100 MG/1
100 CAPSULE, LIQUID FILLED ORAL
Status: DISCONTINUED | OUTPATIENT
Start: 2017-09-22 | End: 2017-09-24 | Stop reason: HOSPADM

## 2017-09-22 RX ORDER — HYDROCORTISONE ACETATE PRAMOXINE HCL 2.5; 1 G/100G; G/100G
CREAM TOPICAL AS NEEDED
Status: DISCONTINUED | OUTPATIENT
Start: 2017-09-22 | End: 2017-09-24 | Stop reason: HOSPADM

## 2017-09-22 RX ORDER — OXYTOCIN/RINGER'S LACTATE 20/1000 ML
125-500 PLASTIC BAG, INJECTION (ML) INTRAVENOUS ONCE
Status: ACTIVE | OUTPATIENT
Start: 2017-09-22 | End: 2017-09-23

## 2017-09-22 RX ORDER — HYDROMORPHONE HYDROCHLORIDE 2 MG/1
4 TABLET ORAL
Status: DISCONTINUED | OUTPATIENT
Start: 2017-09-22 | End: 2017-09-24 | Stop reason: HOSPADM

## 2017-09-22 RX ORDER — HYDROMORPHONE HYDROCHLORIDE 2 MG/1
2 TABLET ORAL
Qty: 15 TAB | Refills: 0 | Status: SHIPPED | OUTPATIENT
Start: 2017-09-22 | End: 2017-10-25

## 2017-09-22 RX ORDER — OXYTOCIN IN 5 % DEXTROSE 30/500 ML
1-25 PLASTIC BAG, INJECTION (ML) INTRAVENOUS
Status: DISCONTINUED | OUTPATIENT
Start: 2017-09-23 | End: 2017-09-22

## 2017-09-22 RX ORDER — FENTANYL/BUPIVACAINE/NS/PF 2-1250MCG
1-16 PREFILLED PUMP RESERVOIR EPIDURAL CONTINUOUS
Status: DISCONTINUED | OUTPATIENT
Start: 2017-09-22 | End: 2017-09-23 | Stop reason: CLARIF

## 2017-09-22 RX ORDER — OXYTOCIN IN 5 % DEXTROSE 30/500 ML
1-25 PLASTIC BAG, INJECTION (ML) INTRAVENOUS
Status: DISCONTINUED | OUTPATIENT
Start: 2017-09-22 | End: 2017-09-22

## 2017-09-22 RX ORDER — SODIUM CHLORIDE 0.9 % (FLUSH) 0.9 %
5-10 SYRINGE (ML) INJECTION AS NEEDED
Status: DISCONTINUED | OUTPATIENT
Start: 2017-09-22 | End: 2017-09-24 | Stop reason: HOSPADM

## 2017-09-22 RX ORDER — NALOXONE HYDROCHLORIDE 0.4 MG/ML
0.4 INJECTION, SOLUTION INTRAMUSCULAR; INTRAVENOUS; SUBCUTANEOUS AS NEEDED
Status: DISCONTINUED | OUTPATIENT
Start: 2017-09-22 | End: 2017-09-24 | Stop reason: HOSPADM

## 2017-09-22 RX ADMIN — BUPIVACAINE HYDROCHLORIDE 5 ML: 2.5 INJECTION, SOLUTION EPIDURAL; INFILTRATION; INTRACAUDAL at 10:01

## 2017-09-22 RX ADMIN — Medication 2 MILLI-UNITS/MIN: at 04:42

## 2017-09-22 RX ADMIN — MAGNESIUM SULFATE 2 G/HR: 500 INJECTION, SOLUTION INTRAMUSCULAR; INTRAVENOUS at 19:41

## 2017-09-22 RX ADMIN — MAGNESIUM SULFATE 2 G/HR: 500 INJECTION, SOLUTION INTRAMUSCULAR; INTRAVENOUS at 13:49

## 2017-09-22 RX ADMIN — LIDOCAINE HYDROCHLORIDE AND EPINEPHRINE 3 ML: 20; 5 INJECTION, SOLUTION EPIDURAL; INFILTRATION; INTRACAUDAL; PERINEURAL at 10:00

## 2017-09-22 RX ADMIN — LABETALOL HYDROCHLORIDE 20 MG: 5 INJECTION INTRAVENOUS at 12:30

## 2017-09-22 RX ADMIN — MAGNESIUM SULFATE HEPTAHYDRATE 4 G: 40 INJECTION, SOLUTION INTRAVENOUS at 13:13

## 2017-09-22 RX ADMIN — SODIUM CHLORIDE, SODIUM LACTATE, POTASSIUM CHLORIDE, AND CALCIUM CHLORIDE 999 ML/HR: 600; 310; 30; 20 INJECTION, SOLUTION INTRAVENOUS at 09:08

## 2017-09-22 RX ADMIN — LABETALOL HYDROCHLORIDE 20 MG: 5 INJECTION, SOLUTION INTRAVENOUS at 12:30

## 2017-09-22 RX ADMIN — LIDOCAINE HYDROCHLORIDE AND EPINEPHRINE 7 ML: 20; 5 INJECTION, SOLUTION EPIDURAL; INFILTRATION; INTRACAUDAL; PERINEURAL at 10:30

## 2017-09-22 RX ADMIN — LIDOCAINE HYDROCHLORIDE AND EPINEPHRINE 2 ML: 20; 5 INJECTION, SOLUTION EPIDURAL; INFILTRATION; INTRACAUDAL; PERINEURAL at 10:01

## 2017-09-22 RX ADMIN — SODIUM CHLORIDE, SODIUM LACTATE, POTASSIUM CHLORIDE, AND CALCIUM CHLORIDE 125 ML/HR: 600; 310; 30; 20 INJECTION, SOLUTION INTRAVENOUS at 11:49

## 2017-09-22 RX ADMIN — FENTANYL 0.2 MG/100ML-BUPIV 0.125%-NACL 0.9% EPIDURAL INJ 12 ML/HR: 2/0.125 SOLUTION at 10:12

## 2017-09-22 NOTE — LACTATION NOTE
This note was copied from a baby's chart. Discussed with mother her plan for feeding. Reviewed the benefits of exclusive breast milk feeding during the hospital stay. Informed her of the risks of using formula to supplement in the first few days of life as well as the benefits of successful breast milk feeding; referred her to the Breastfeeding booklet about this information. She acknowledges understanding of information reviewed and states that it is her plan to both breast and formula feed her infant. Will support her choice and offer additional information as needed. Reviewed breastfeeding basics:  How milk is made and normal  breastfeeding behaviors discussed. Supply and demand,  stomach size, early feeding cues, skin to skin bonding with comfortable positioning and baby led latch-on reviewed. How to identify signs of successful breastfeeding sessions reviewed; education on assymetrical latch, signs of effective latching vs shallow, in-effective latching, normal  feeding frequency and duration and expected infant output discussed. Normal course of breastfeeding discussed including the AAP's recommendation that children receive exclusive breast milk feedings for the first six months of life with breast milk feedings to continue through the first year of life and/or beyond as complimentary table foods are added. Breastfeeding Booklet and Warm line information provided with discussion. Discussed typical  weight loss and the importance of pediatrician appointment within 24-48 hours of discharge, at 2 weeks of life and normalcy of requesting pediatric weight checks as needed in between visits. Pt chooses to do both breast and bottle.   Discussed effects of early supplementation on breastfeeding success; may decrease breastmilk production and supply, increase risk for pathological engorgement, baby may develop preference for faster flow from bottles vs breast, and baby's stomach can be stretched if larger volumes of formula are given. Mother on [address] and states she just wants to formula feed right now until she is feeling better, and hasn't decided if she wants to breastfeed but says she will try later once she is feeling up to it. Baby under warmer at this time for decrease temp, took 10ml of formula by bottle.

## 2017-09-22 NOTE — ANESTHESIA PROCEDURE NOTES
Epidural Block    Start time: 9/22/2017 10:45 AM  End time: 9/22/2017 11:05 AM  Performed by: Linsey Galan  Authorized by: Augusto MEJIA     Pre-Procedure  Indication: labor epidural    Preanesthetic Checklist: patient identified, risks and benefits discussed, anesthesia consent, timeout performed and anesthesia consent      Epidural:   Patient position:  Seated  Prep region:  Lumbar  Prep: Betadine    Location:  L3-4    Needle and Epidural Catheter:   Needle Type:  Tuohy  Needle Gauge:  17 G  Injection Technique:  Loss of resistance using saline  Attempts:  1  Catheter Size:  18 G  Events: no paresthesia and negative aspiration test    Test Dose:  Lidocaine 1.5% w/ epi and negative    Assessment:   Catheter Secured:  Tegaderm and tape  Insertion:  Uncomplicated  Patient tolerance:  Patient tolerated the procedure well with no immediate complications

## 2017-09-22 NOTE — PROGRESS NOTES
Labor Progress Note  Patient seen, fetal heart rate and contraction pattern evaluated, patient examined. Physical Exam:  Cervical Exam: Cervical miranda in place   Membranes:  Intact  Uterine Activity: Frequency: irregular contractions  Fetal Heart Rate: Reactive  Station -3    Assessment/Plan:  Reassuring fetal status, patient feeling uncomfortable with contractions, s/p stadol. Recommend ambien qhs. Begin pitocin @ 4am tomorrow. Reassurance given.    Discussed with pt and family, agree

## 2017-09-22 NOTE — PROGRESS NOTES
Progress Note- late entry  Pt with persistently elevated -210'W systolic. Started on magnesium for seizure prophylaxis and given labetolol. BP's overall significantly improved since delivery. Will continue magnesium for 24 hours after delivery.

## 2017-09-22 NOTE — PROGRESS NOTES
Called for delivery. Pt c/c/+2/  Pushed less than 10 minutes with TSVD of LBMI. Placenta spontaneously delivered. Nl 3VC. 1st degree perineal laceration repaired with single figure of 8 suture of 3-0 vicryl. APGARS and Weight were not available at the time of this note. . Baby and mom are doing well.

## 2017-09-22 NOTE — PROGRESS NOTES
7:12 AM  Bedside and Verbal shift change report given to Damien Howe, RN (oncoming nurse) by Cinthya Bennett RN (offgoing nurse). Report included the following information SBAR, Kardex, Procedure Summary, Intake/Output, MAR, Recent Results and Med Rec Status. Assumed care of the pt.     9:17 AM  Dr. Carlos Ceja in the room performing SVE, pt. Is 4/50/-3; pt. SROM at 0913. Pt to receive epidural momentarily. Will continue to monitor pt.     9:28 AM  Anes paged for epidural.    12:45 PM  Dr. Carlos Ceja in room to assess the pt , SVE performed and pt is 5/80/-1. Dr. Carlos Ceja discusses beginning IV magnesium with the pt.    13:05 PM  Attempted to draw labs prior to starting IV magnesium, unsuccessful attempts x3. Lab draw to be reattempted by other RN. Will continue to monitor pt.    14:15PM   of viable baby boy at 14:15PM by Dr. Carlos Ceja, robust cry heard on delivery, Apgars 8/9. Will continue to monitor pt. Patient to continue on IV magnesium. Will continue to monitor pt.    7:37 PM  Bedside and Verbal shift change report given to Luli Flores RN (oncoming nurse) by Damien Howe RN (offgoing nurse). Report included the following information SBAR, Kardex, Procedure Summary, Intake/Output, MAR, Recent Results and Med Rec Status.

## 2017-09-22 NOTE — ANESTHESIA PREPROCEDURE EVALUATION
Anesthetic History   No history of anesthetic complications            Review of Systems / Medical History  Patient summary reviewed, nursing notes reviewed and pertinent labs reviewed    Pulmonary  Within defined limits                 Neuro/Psych   Within defined limits           Cardiovascular  Within defined limits                Exercise tolerance: >4 METS     GI/Hepatic/Renal  Within defined limits              Endo/Other  Within defined limits           Other Findings              Physical Exam    Airway  Mallampati: II  TM Distance: 4 - 6 cm  Neck ROM: normal range of motion   Mouth opening: Normal     Cardiovascular               Dental         Pulmonary                 Abdominal         Other Findings            Anesthetic Plan    ASA: 2  Anesthesia type: epidural            Anesthetic plan and risks discussed with: Patient

## 2017-09-23 PROCEDURE — 74011250637 HC RX REV CODE- 250/637: Performed by: OBSTETRICS & GYNECOLOGY

## 2017-09-23 PROCEDURE — 74011250636 HC RX REV CODE- 250/636: Performed by: OBSTETRICS & GYNECOLOGY

## 2017-09-23 PROCEDURE — 77030021125

## 2017-09-23 PROCEDURE — 65270000029 HC RM PRIVATE

## 2017-09-23 RX ORDER — LABETALOL 100 MG/1
100 TABLET, FILM COATED ORAL EVERY 12 HOURS
Status: DISCONTINUED | OUTPATIENT
Start: 2017-09-23 | End: 2017-09-24 | Stop reason: HOSPADM

## 2017-09-23 RX ADMIN — Medication 10 ML: at 17:04

## 2017-09-23 RX ADMIN — MAGNESIUM SULFATE 2 G/HR: 500 INJECTION, SOLUTION INTRAMUSCULAR; INTRAVENOUS at 00:12

## 2017-09-23 RX ADMIN — HYDROMORPHONE HYDROCHLORIDE 2 MG: 2 TABLET ORAL at 04:12

## 2017-09-23 RX ADMIN — HYDROMORPHONE HYDROCHLORIDE 2 MG: 2 TABLET ORAL at 09:05

## 2017-09-23 RX ADMIN — MAGNESIUM SULFATE 2 G/HR: 500 INJECTION, SOLUTION INTRAMUSCULAR; INTRAVENOUS at 11:57

## 2017-09-23 RX ADMIN — HYDROMORPHONE HYDROCHLORIDE 2 MG: 2 TABLET ORAL at 17:03

## 2017-09-23 RX ADMIN — LABETALOL HCL 100 MG: 100 TABLET, FILM COATED ORAL at 17:01

## 2017-09-23 RX ADMIN — MAGNESIUM SULFATE 2 G/HR: 500 INJECTION, SOLUTION INTRAMUSCULAR; INTRAVENOUS at 06:29

## 2017-09-23 RX ADMIN — SODIUM CHLORIDE, SODIUM LACTATE, POTASSIUM CHLORIDE, AND CALCIUM CHLORIDE 75 ML/HR: 600; 310; 30; 20 INJECTION, SOLUTION INTRAVENOUS at 00:39

## 2017-09-23 RX ADMIN — HYDROMORPHONE HYDROCHLORIDE 2 MG: 2 TABLET ORAL at 13:04

## 2017-09-23 NOTE — PROGRESS NOTES
Bedside, Verbal and Written shift change report given to Ab Reyes RN (oncoming nurse) by Mikie (offgoing nurse). Report included the following information SBAR, Intake/Output, MAR and Recent Results.

## 2017-09-23 NOTE — PROGRESS NOTES
Post-Partum Day Number 1 Progress Note      Patient doing well post-partum without significant complaint. She is voiding without difficulty, she reports normal lochia. She is ambulating without dizziness. Her pain is well controlled with oral pain medication. She is tolerating general diet. Started on mag intrapartum, tx'd with labetalol. To continue mag until 24hrs PP, off at 1PM today. Not taking any scheduled BP meds, has not required add'l tx. Vitals:  Patient Vitals for the past 8 hrs:   BP Temp Pulse Resp SpO2 Weight   17 0914 - - - - - 262 lb 12.8 oz (119.2 kg)   17 0911 152/87 - 93 - (!) 88 % -   17 0725 - - - - 90 % -   17 0724 143/80 98 °F (36.7 °C) 100 16 - -   17 0711 - - - - 93 % -   17 0705 - - - - 99 % -   17 0635 - - - - 90 % -   17 0629 - - - - 91 % -   17 0622 - - - - 94 % -   17 0605 - - - - 97 % -   17 0603 - - - - 94 % -   17 0535 - - - - 96 % -   17 0521 - - - - 93 % -   17 0515 - - - - (!) 83 % -   17 0505 - - - - 98 % -   17 0435 - - - - 97 % -   17 0423 130/69 - (!) 101 - - -   17 0405 - - - - 98 % -   17 0343 - - - - 94 % -   17 0337 - - - - 94 % -   17 0330 - - - - 94 % -   17 0325 - - - - 94 % -   17 0320 - - - - 94 % -   17 0316 133/66 - 98 - 95 % -   17 0312 - - - - 94 % -   17 0252 - - - - 90 % -   17 0246 - - - - 94 % -   17 0236 - - - - 91 % -   17 0223 151/67 - 96 - - -   17 0216 - - - - 96 % -   17 0211 - - - - 92 % -   17 0206 - - - - 94 % -   17 0158 - - - - 94 % -   17 0146 - - - - 98 % -   17 0145 - - - - 94 % -     Temp (24hrs), Av.3 °F (36.8 °C), Min:97.9 °F (36.6 °C), Max:98.6 °F (37 °C)        Exam:  Patient without distress.                Lungs: CTA bilaterally               CV: regular rate/rhythm, no rubs or gallops, no murmur               Abdomen soft, nontender, nondistended, normal bowel sounds               Uterus: fundus firm at level of umbilicus, nontender               Lower extremities are negative for cords or tenderness, 2-3+ swelling. Labs:   Recent Results (from the past 24 hour(s))   CBC WITH AUTOMATED DIFF    Collection Time: 09/22/17  1:16 PM   Result Value Ref Range    WBC 9.8 3.6 - 11.0 K/uL    RBC 4.17 3.80 - 5.20 M/uL    HGB 12.7 11.5 - 16.0 g/dL    HCT 38.1 35.0 - 47.0 %    MCV 91.4 80.0 - 99.0 FL    MCH 30.5 26.0 - 34.0 PG    MCHC 33.3 30.0 - 36.5 g/dL    RDW 13.9 11.5 - 14.5 %    PLATELET 477 078 - 763 K/uL    NEUTROPHILS 81 (H) 32 - 75 %    LYMPHOCYTES 13 12 - 49 %    MONOCYTES 6 5 - 13 %    EOSINOPHILS 0 0 - 7 %    BASOPHILS 0 0 - 1 %    ABS. NEUTROPHILS 7.9 1.8 - 8.0 K/UL    ABS. LYMPHOCYTES 1.3 0.8 - 3.5 K/UL    ABS. MONOCYTES 0.6 0.0 - 1.0 K/UL    ABS. EOSINOPHILS 0.0 0.0 - 0.4 K/UL    ABS. BASOPHILS 0.0 0.0 - 0.1 K/UL   METABOLIC PANEL, COMPREHENSIVE    Collection Time: 09/22/17  1:16 PM   Result Value Ref Range    Sodium 138 136 - 145 mmol/L    Potassium 4.0 3.5 - 5.1 mmol/L    Chloride 104 97 - 108 mmol/L    CO2 23 21 - 32 mmol/L    Anion gap 11 5 - 15 mmol/L    Glucose 98 65 - 100 mg/dL    BUN 7 6 - 20 MG/DL    Creatinine 0.85 0.55 - 1.02 MG/DL    BUN/Creatinine ratio 8 (L) 12 - 20      GFR est AA >60 >60 ml/min/1.73m2    GFR est non-AA >60 >60 ml/min/1.73m2    Calcium 8.5 8.5 - 10.1 MG/DL    Bilirubin, total 0.4 0.2 - 1.0 MG/DL    ALT (SGPT) 13 12 - 78 U/L    AST (SGOT) 9 (L) 15 - 37 U/L    Alk. phosphatase 126 (H) 45 - 117 U/L    Protein, total 6.3 (L) 6.4 - 8.2 g/dL    Albumin 2.6 (L) 3.5 - 5.0 g/dL    Globulin 3.7 2.0 - 4.0 g/dL    A-G Ratio 0.7 (L) 1.1 - 2.2     PROTEIN/CREATININE RATIO, URINE    Collection Time: 09/22/17  1:18 PM   Result Value Ref Range    Protein, urine random 126 (H) 0.0 - 11.9 mg/dL    Creatinine, urine 181.12 mg/dL    Protein/Creat.  urine Ratio 0.7         Assessment and Plan:   Postpartum Day #1 S/P . Doing well.   BPs still elevated, 130s-150s/60s-80s but not in severe range, not requiring additional tx.   - continue mag until 24hrs PP, off 1PM this afternoon  - monitor BPs, labetalol prn   - anticipate discharge in AM

## 2017-09-23 NOTE — PROGRESS NOTES
9569 - assumed care of patient at this time. 2505 - Patient sitting up on the side of the bed.     0855 - Patient up on bedside commode. [address] - Patient sitting up in bed holding infant. 1625 - Infant cleared to transfer to MIU per nursery. Patient in room packing bags. BP taken and elevated = 172/95. Dr. Beni Crowe called and informed of elevated BP reading. Will retake BP in 15 mins and inform MD of reading, will inform nursery of patient need to stay in L&D.     1647 - BP reading taken = 151/85. Dr. Beni Crowe called and informed of BP reading. TORB to begin administration of 100mg PO labetalol q12h and retake BP q30min x 2. If BP stable (remains 140/150's /80's) patient can transfer to MIU.     1704 - Attempted to flush IV site, blood and fluid noted to come out of IV site when flushed. Taped removed and re-flushed. IV infiltrated, swelling noted in right hand. PIV removed at this time. 1712 - Bedside and Verbal shift change report given to Kendal Keith RN (oncoming nurse) by Donny Hayward RN (offgoing nurse). Report included the following information SBAR, Procedure Summary, Intake/Output, MAR, Recent Results and Med Rec Status.

## 2017-09-23 NOTE — LACTATION NOTE
This note was copied from a baby's chart. Pt will successfully establish breastfeeding by feeding in response to early feeding cues   or wake every 3h, will obtain deep latch, and will keep log of feedings/output. Taught to BF at hunger cues and or q 2-3 hrs and to offer 10-20 drops of hand expressed colostrum at any non-feeds. Breast Assessment  Left Breast: Large, Extra large  Left Nipple: Flat, Intact  Right Breast: Large, Extra large  Right Nipple: Flat, Intact  Breast- Feeding Assessment  Attends Breast-Feeding Classes: No  Breast-Feeding Experience: No  Breast Trauma/Surgery: No  Type/Quality:  (pump set up, mostly formula feeding)           Mother states that she is not sure she wants to breastfeed but, agrees to try pumping. Baby is being formula fed at this time and is having some low blood sugar issues. Pumping:  Guidelines for pumping, milk collection and storage, proper cleaning of pump parts all reviewed. How to establish and maintain breast milk supply through pumping reviewed. Differences between hospital grade rental pumps vs store bought double electric/hand pumps discussed. Set up pumping with double electric set up. Assisted with pump session. List of area pump rental locations and lactation support services provided. Reviewed breastfeeding basics:  How milk is made and normal  breastfeeding behaviors discussed. Supply and demand,  stomach size, early feeding cues, skin to skin bonding  Breastfeeding Booklet and Warm line information provided with discussion. Discussed typical  weight loss and the importance of pediatrician appointment within 24-48 hours of discharge, at 2 weeks of life and normalcy of requesting pediatric weight checks as needed in between visits. Pt chooses to do both breast and bottle.   Discussed effects of early supplementation on breastfeeding success; may decrease breastmilk production and supply, increase risk for pathological engorgement, baby may develop preference for faster flow from bottles vs breast, and baby's stomach can be stretched if larger volumes of formula are given. Mother's feeding goals:  1.) To not feed infant at the breast  2.) To pump to establish milk supply 3.) To offer formula until her milk is established. Pump set up with instruction. [employer], exclusive pumping materials provided.

## 2017-09-24 VITALS
DIASTOLIC BLOOD PRESSURE: 82 MMHG | TEMPERATURE: 98 F | WEIGHT: 262.8 LBS | BODY MASS INDEX: 46.56 KG/M2 | HEIGHT: 63 IN | OXYGEN SATURATION: 98 % | SYSTOLIC BLOOD PRESSURE: 141 MMHG | HEART RATE: 84 BPM | RESPIRATION RATE: 16 BRPM

## 2017-09-24 PROCEDURE — 74011250637 HC RX REV CODE- 250/637: Performed by: OBSTETRICS & GYNECOLOGY

## 2017-09-24 RX ORDER — LABETALOL 100 MG/1
100 TABLET, FILM COATED ORAL EVERY 12 HOURS
Qty: 60 TAB | Refills: 1 | Status: SHIPPED | OUTPATIENT
Start: 2017-09-24 | End: 2017-09-27

## 2017-09-24 RX ADMIN — LABETALOL HCL 100 MG: 100 TABLET, FILM COATED ORAL at 08:30

## 2017-09-24 RX ADMIN — HYDROMORPHONE HYDROCHLORIDE 2 MG: 2 TABLET ORAL at 10:19

## 2017-09-24 NOTE — PROGRESS NOTES
Bedside, Verbal and Written shift change report given to [address] (oncoming nurse) by [address] (offgoing nurse). Report included the following information SBAR, Intake/Output, MAR and Recent Results.

## 2017-09-24 NOTE — DISCHARGE INSTRUCTIONS
POST DELIVERY DISCHARGE INSTRUCTIONS    Name: Michelle Caceres  YOB: 1995  Primary Diagnosis: Active Problems:    * No active hospital problems. *      General:     Diet/Diet Restrictions:  Eight 8-ounce glasses of fluid daily (water, juices); avoid excessive caffeine intake. Meals/snacks as desired which are high in fiber and carbohydrates and low in fat and cholesterol. Medications:   {Medication reconciliation information is now added to the patient's AVS automatically when it is printed. There is no need to use this SmartLink in discharge instructions. Highlight this text and delete it to clear this message}      Physical Activity / Restrictions / Safety:     Avoid heavy lifting, no more that 8 lbs. For 2-3 weeks; No driving while taking narcotic pain medication. Post  patients should not drive until pain free. No intercourse 4-6 weeks, no douching or tampon use. May resume exercise in 6 weeks. Discharge Instructions/Special Treatment/Home Care Needs:     Continue prenatal vitamins. Continue to use squirt bottle with warm water on your episiotomy after each bathroom use until bleeding stops. If steri-strips applied to your incision, remove in 7 days. Take stool softeners daily. Call your doctor for the following:     Fever over 101 degrees by mouth. Vaginal bleeding heavier than a normal menstrual period or lost larger than a golf ball. Red streaks or increased swelling of legs, painful red streaks on your breast.  Painful urination, or increased pain, redness or discharge with your incision. Pain Management:     Pain Management:   Take prescribed medication as directed for pain. Use a warm Sitz bath 3 times daily to relieve episiotomy or hemorrhoidal discomfort. For hemorrhoidal discomfort, use Tucks and Anusol cream as needed and directed.     Follow-Up Care:     Appointment with MD:   Follow-up Appointments   Procedures    FOLLOW UP VISIT Appointment in: 6 Weeks Standing Status:   Standing     Number of Occurrences:   1     Order Specific Question:   Appointment in     Answer:   6 Weeks     Telephone number: [phone]    Signed By: Christine Colmenares MD                                                                                                   Date: 2017 Time: 3:36 PM             After Your Delivery (the Postpartum Period): Care Instructions  Your Care Instructions  Congratulations on the birth of your baby. Like pregnancy, the  period can be a time of excitement, per, and exhaustion. You may look at your wondrous little baby and feel happy. You may also be overwhelmed by your new sleep hours and new responsibilities. At first, babies often sleep during the days and are awake at night. They do not have a pattern or routine. They may make sudden gasps, jerk themselves awake, or look like they have crossed eyes. These are all normal, and they may even make you smile. In these first weeks after delivery, try to take good care of yourself. It may take 4 to 6 weeks to feel like yourself again, and possibly longer if you had a  birth. You will likely feel very tired for several weeks. Your days will be full of ups and downs, but lots of per as well. Follow-up care is a key part of your treatment and safety. Be sure to make and go to all appointments, and call your doctor if you are having problems. It's also a good idea to know your test results and keep a list of the medicines you take. How can you care for yourself at home? Take care of your body after delivery  · Use pads instead of tampons for the bloody flow that may last as long as 2 weeks. · Ease soreness of hemorrhoids and the area between your vagina and rectum with ice compresses or witch hazel pads. · Ease constipation by drinking lots of fluid and eating high-fiber foods. Ask your doctor about over-the-counter stool softeners.   · Cleanse yourself with a gentle squeeze of warm water from a bottle instead of wiping with toilet paper. · Take a sitz bath in warm water several times a day. · Wear a good nursing bra. Ease sore and swollen breasts with warm, wet washcloths. · If you are not breastfeeding, use ice rather than heat for breast soreness. · Your period may not start for several months if you are breastfeeding. You may bleed more, and longer at first, than you did before you got pregnant. · Wait until you are healed (about 4 to 6 weeks) before you have sexual intercourse. Your doctor will tell you when it is okay to have sex. · Try not to travel with your baby for 5 or 6 weeks. If you take a long car trip, make frequent stops to walk around and stretch. Avoid exhaustion  · Rest every day. Try to nap when your baby naps. · Ask another adult to be with you for a few days after delivery. · Plan for  if you have other children. · Stay flexible so you can eat at odd hours and sleep when you need to. Both you and your baby are making new schedules. · Plan small trips to get out of the house. Change can make you feel less tired. · Ask for help with housework, cooking, and shopping. Remind yourself that your job is to care for your baby. Know about help for postpartum depression  · \"Baby blues\" are common for the first 1 to 2 weeks after birth. You may cry or feel sad or irritable for no reason. · Rest whenever you can. Being tired makes it harder to handle your emotions. · Go for walks with your baby. · Talk to your partner, friends, and family about your feelings. · If your symptoms last for more than a few weeks, or if you feel very depressed, ask your doctor for help. · Postpartum depression can be treated. Support groups and counseling can help. Sometimes medicine can also help. Stay healthy  · Eat healthy foods so you have more energy, make good breast milk, and lose extra baby pounds. · If you breastfeed, avoid alcohol and drugs. Stay smoke-free.  If you quit during pregnancy, congratulations. · Start daily exercise after 4 to 6 weeks, but rest when you feel tired. · Learn exercises to tone your belly. Do Kegel exercises to regain strength in your pelvic muscles. You can do these exercises while you stand or sit. ¨ Squeeze the same muscles you would use to stop your urine. Your belly and thighs should not move. ¨ Hold the squeeze for 3 seconds, and then relax for 3 seconds. ¨ Start with 3 seconds. Then add 1 second each week until you are able to squeeze for 10 seconds. ¨ Repeat the exercise 10 to 15 times for each session. Do three or more sessions each day. · Find a class for new mothers and new babies that has an exercise time. · If you had a  birth, give yourself a bit more time before you exercise, and be careful. When should you call for help? Call 911 anytime you think you may need emergency care. For example, call if:  · You passed out (lost consciousness). Call your doctor now or seek immediate medical care if:  · You have severe vaginal bleeding. This means you are passing blood clots and soaking through a pad each hour for 2 or more hours. · You are dizzy or lightheaded, or you feel like you may faint. · You have a fever. · You have new belly pain, or your pain gets worse. Watch closely for changes in your health, and be sure to contact your doctor if:  · Your vaginal bleeding seems to be getting heavier. · You have new or worse vaginal discharge. · You feel sad, anxious, or hopeless for more than a few days. · You do not get better as expected. Where can you learn more? Go to http://kirby-annalee.info/. Enter A461 in the search box to learn more about \"After Your Delivery (the Postpartum Period): Care Instructions. \"  Current as of: 2017  Content Version: 11.3  © 2225-5887 [employer].  Care instructions adapted under license by [employer] (which disclaims liability or warranty for this information). If you have questions about a medical condition or this instruction, always ask your healthcare professional. [place] [address] any warranty or liability for your use of this information.

## 2017-09-24 NOTE — PROGRESS NOTES
9432 - Assume care of patient at this time. 0659 - Patient sitting up on side of bed holding infant. Shanti - MD at bedside discussing POC and DC.     6602 - Pediatrician at bedside discussing [address] for infant, RN discussing boarding with patient. 1025 - I have reviewed discharge instructions with the patient and spouse including follow-up care at 1 week and 6 weeks PP, prescriptions for labetalol and dilaudid to take home side effects and uses, boarding policy, post partum care and mychart. The patient and spouse verbalized understanding and were given opportunity for questions at this time, patient had no additional questions. Discharge paperwork signed and prescriptions given to patient.

## 2017-09-25 ENCOUNTER — HOSPITAL ENCOUNTER (INPATIENT)
Age: 22
LOS: 2 days | Discharge: HOME | DRG: 561 | End: 2017-09-27
Attending: OBSTETRICS & GYNECOLOGY | Admitting: OBSTETRICS & GYNECOLOGY
Payer: COMMERCIAL

## 2017-09-25 DIAGNOSIS — R55 SYNCOPE, UNSPECIFIED SYNCOPE TYPE: ICD-10-CM

## 2017-09-25 DIAGNOSIS — R56.9 CONVULSIONS, UNSPECIFIED CONVULSION TYPE (HCC): ICD-10-CM

## 2017-09-25 PROCEDURE — 99218 HC RM OBSERVATION: CPT

## 2017-09-25 PROCEDURE — 77030034850

## 2017-09-25 PROCEDURE — 75810000275 HC EMERGENCY DEPT VISIT NO LEVEL OF CARE

## 2017-09-25 PROCEDURE — 74011000250 HC RX REV CODE- 250: Performed by: OBSTETRICS & GYNECOLOGY

## 2017-09-25 PROCEDURE — 74011250636 HC RX REV CODE- 250/636: Performed by: OBSTETRICS & GYNECOLOGY

## 2017-09-25 PROCEDURE — 51702 INSERT TEMP BLADDER CATH: CPT

## 2017-09-25 PROCEDURE — 74011250636 HC RX REV CODE- 250/636

## 2017-09-25 PROCEDURE — 74011000250 HC RX REV CODE- 250

## 2017-09-25 PROCEDURE — 65270000029 HC RM PRIVATE

## 2017-09-25 RX ORDER — MAGNESIUM SULFATE HEPTAHYDRATE 40 MG/ML
4 INJECTION, SOLUTION INTRAVENOUS ONCE
Status: COMPLETED | OUTPATIENT
Start: 2017-09-25 | End: 2017-09-26

## 2017-09-25 RX ORDER — MAGNESIUM SULFATE HEPTAHYDRATE 40 MG/ML
INJECTION, SOLUTION INTRAVENOUS
Status: COMPLETED
Start: 2017-09-25 | End: 2017-09-25

## 2017-09-25 RX ORDER — SODIUM CHLORIDE 0.9 % (FLUSH) 0.9 %
5-10 SYRINGE (ML) INJECTION AS NEEDED
Status: DISCONTINUED | OUTPATIENT
Start: 2017-09-25 | End: 2017-09-27 | Stop reason: HOSPADM

## 2017-09-25 RX ORDER — MAGNESIUM SULFATE HEPTAHYDRATE 40 MG/ML
2 INJECTION, SOLUTION INTRAVENOUS ONCE
Status: COMPLETED | OUTPATIENT
Start: 2017-09-25 | End: 2017-09-26

## 2017-09-25 RX ORDER — LABETALOL HYDROCHLORIDE 5 MG/ML
INJECTION, SOLUTION INTRAVENOUS
Status: COMPLETED
Start: 2017-09-25 | End: 2017-09-25

## 2017-09-25 RX ORDER — LABETALOL HYDROCHLORIDE 5 MG/ML
40 INJECTION, SOLUTION INTRAVENOUS
Status: COMPLETED | OUTPATIENT
Start: 2017-09-26 | End: 2017-09-25

## 2017-09-25 RX ORDER — SODIUM CHLORIDE 0.9 % (FLUSH) 0.9 %
5-10 SYRINGE (ML) INJECTION EVERY 8 HOURS
Status: DISCONTINUED | OUTPATIENT
Start: 2017-09-26 | End: 2017-09-27 | Stop reason: HOSPADM

## 2017-09-25 RX ORDER — LABETALOL HYDROCHLORIDE 5 MG/ML
20 INJECTION, SOLUTION INTRAVENOUS ONCE
Status: COMPLETED | OUTPATIENT
Start: 2017-09-26 | End: 2017-09-25

## 2017-09-25 RX ADMIN — MAGNESIUM SULFATE HEPTAHYDRATE 4 G: 40 INJECTION, SOLUTION INTRAVENOUS at 23:06

## 2017-09-25 RX ADMIN — LABETALOL HYDROCHLORIDE 40 MG: 5 INJECTION INTRAVENOUS at 23:29

## 2017-09-25 RX ADMIN — MAGNESIUM SULFATE 2 G/HR: 500 INJECTION, SOLUTION INTRAMUSCULAR; INTRAVENOUS at 23:50

## 2017-09-25 RX ADMIN — MAGNESIUM SULFATE HEPTAHYDRATE 2 G: 40 INJECTION, SOLUTION INTRAVENOUS at 23:19

## 2017-09-25 RX ADMIN — LABETALOL HYDROCHLORIDE 20 MG: 5 INJECTION, SOLUTION INTRAVENOUS at 23:10

## 2017-09-25 RX ADMIN — LABETALOL HYDROCHLORIDE 20 MG: 5 INJECTION INTRAVENOUS at 23:10

## 2017-09-25 NOTE — IP AVS SNAPSHOT
Marily De 
 
 
 [address] [facility] 
[phone] Patient: Margi June MRN: [id number] :1995 You are allergic to the following Allergen Reactions Aspirin Hoarseness Ibuprofen Itching Tylenol (Acetaminophen) Unknown (comments) Stuffy nose and scratchy throat Recent Documentation Height Weight Breastfeeding? BMI OB Status Smoking Status 1.6 m 118.8 kg Yes 46.41 kg/m2 Recent pregnancy Never Smoker Emergency Contacts Name Discharge Info Relation Home Work Mobile Leah Jeff DISCHARGE CAREGIVER [3] Mother [14]   [phone] Dena Hobson     [phone] About your hospitalization You were admitted on:  2017 You last received care in the:  [facility] 2 LABOR & DELIVERY You were discharged on:  2017 Unit phone number:  [phone] Why you were hospitalized Your primary diagnosis was:  Syncope Your diagnoses also included:  Hypertension In Pregnancy, Preeclampsia, Severe, Postpartum Condition, History Of Migraine, Convulsions (Hcc) Providers Seen During Your Hospitalizations Provider Role Specialty Primary office phone Andrzej Mendoza MD Attending Provider Obstetrics & Gynecology [phone] Your Primary Care Physician (PCP) Primary Care Physician Office Phone Office Fax NONE ** None ** ** None ** Follow-up Information Follow up With Details Comments Contact Info Aidan Wilder NP Go on 10/18/2017 Neurology appt. for migraine HA mgmt, with nurse practitioner:  Arrive: 1100, Appt at: [address] [address] 
[phone] Andrzej Mendoza MD In 1 day 10:30 [address] [place] [address] [facility] 
[phone] Your Appointments 2017 11:30 AM EDT Any with Aidan Wilder NP  
 [address] [facility] ([address] [facility]) [place] [address] [facility] [id number] [phone] Current Discharge Medication List  
  
START taking these medications Dose & Instructions Dispensing Information Comments Morning Noon Evening Bedtime NIFEdipine ER 60 mg ER tablet Commonly known as:  PROCARDIA XL Your last dose was: Your next dose is:    
   
   
 Dose:  60 mg Take 1 Tab by mouth daily. Quantity:  30 Tab Refills:  11 STOP taking these medications   
 labetalol 100 mg tablet Commonly known as:  Sabino Winn your doctor about these medications Dose & Instructions Dispensing Information Comments Morning Noon Evening Bedtime HYDROcodone-acetaminophen 5-325 mg per tablet Commonly known as:  Michelle Quintanaa Your last dose was: Your next dose is:    
   
   
 Dose:  1-2 Tab Take 1-2 Tabs by mouth every four (4) hours as needed for Pain. Max Daily Amount: 12 Tabs. Quantity:  13 Tab Refills:  0 HYDROmorphone 2 mg tablet Commonly known as:  DILAUDID Your last dose was: Your next dose is:    
   
   
 Dose:  2 mg Take 1 Tab by mouth every four (4) hours as needed. Max Daily Amount: 12 mg. Quantity:  15 Tab Refills:  0 Where to Get Your Medications Information on where to get these meds will be given to you by the nurse or doctor. ! Ask your nurse or doctor about these medications NIFEdipine ER 60 mg ER tablet Discharge Instructions Learning About Preeclampsia After Childbirth What is preeclampsia? A woman with preeclampsia has blood pressure that is higher than usual. She may also have other serious symptoms. Preeclampsia can be dangerous.  When it is severe, it can cause seizures (eclampsia) or liver or kidney damage. When the liver is affected, some women get HELLP syndrome, a blood-clotting and bleeding problem. HELLP can come on quickly and can be deadly. This is why your doctor checks you and your baby often. Preeclampsia usually occurs after 20 weeks of pregnancy. In rare cases, it is first noted right after childbirth. Most often, it starts near the end of pregnancy and goes away after childbirth. What are the symptoms? Mild preeclampsia usually doesn't cause symptoms. But preeclampsia can cause rapid weight gain and sudden swelling of the hands and face. Severe preeclampsia does cause symptoms. It can cause a very bad headache and trouble seeing and breathing. It also can cause belly pain. You may also urinate less than usual. 
If you have new preeclampsia symptoms after you go home from the hospital, call your doctor right away. What can you expect after you have had preeclampsia? In the hospital 
After the baby and the placenta are delivered, preeclampsia usually starts to improve. Most women get better in the first few days after childbirth. After having preeclampsia, you still have a risk of seizures for a day or more after childbirth. (Very rarely, seizures happen later on.) So your doctor may have you take magnesium sulfate for a day or more to prevent seizures. You may also take medicine to lower your blood pressure. When you go home Your blood pressure will most likely return to normal a few days after delivery. Your doctor will want to check your blood pressure sometime in the first week after you leave the hospital. 
Some women still have high blood pressure 6 weeks after childbirth. But most return to normal levels over the long term. · Take and record your blood pressure at home if your doctor tells you to. ¨ Learn the importance of the two measures of blood pressure (such as 120 over 80, or 120/80). The first number is the systolic pressure.  This is the force of blood on the artery walls as the heart pumps. The second number is the diastolic pressure. This is the force of blood on the artery walls between heartbeats, when the heart is at rest. You have a choice of monitors to use. § Manual monitor: You pump up the cuff and use a stethoscope to listen for your pulse. § Electronic monitor: The cuff inflates, and a gauge shows your pulse rate. ¨ To take your blood pressure: § Ask your doctor to check your blood pressure monitor to be sure that it is accurate and that the cuff fits you. Also ask your doctor to watch you use it, to make sure that you are using it right. § You should not eat, use tobacco products, or use medicine known to raise blood pressure (such as some nasal decongestant sprays) before you take your blood pressure. § Avoid taking your blood pressure if you have just exercised or are nervous or upset. Rest at least 15 minutes before you take your blood pressure. · Be safe with medicines. If you take medicine, take it exactly as prescribed. Call your doctor if you think you are having a problem with your medicine. · Do not smoke. Quitting smoking will help lower your blood pressure and improve your baby's growth and health. If you need help quitting, talk to your doctor about stop-smoking programs and medicines. These can increase your chances of quitting for good. · Eat a balanced and healthy diet that has lots of fruits and vegetables. Long-term health After you have had preeclampsia, you have a higher-than-average risk of heart disease, stroke, and kidney disease. This may be because the same things that cause preeclampsia also cause heart and kidney disease. To protect your health, work with your doctor on living a heart-healthy lifestyle and getting the checkups you need. Your doctor may also want you to check your blood pressure at home. Follow-up care is a key part of your treatment and safety.  Be sure to make and go to all appointments, and call your doctor if you are having problems. It's also a good idea to know your test results and keep a list of the medicines you take. Where can you learn more? Go to http://kirby-annalee.info/. Enter K418 in the search box to learn more about \"Learning About Preeclampsia After Childbirth. \" 
Current as of: March 16, 2017 Content Version: 11.3 © 5666-1682 [employer]. Care instructions adapted under license by [employer] (which disclaims liability or warranty for this information). If you have questions about a medical condition or this instruction, always ask your healthcare professional. [address] any warranty or liability for your use of this information. Seizure: Care Instructions Your Care Instructions Seizures are caused by abnormal patterns of electrical signals in the brain. They are different for each person. Seizures can affect movement, speech, vision, or awareness. Some people have only slight shaking of a hand and do not pass out. Other people may pass out and have violent shaking of the whole body. Some people appear to stare into space. They are awake, but they can't respond normally. Later, they may not remember what happened. You may need tests to identify the type and cause of the seizures. A seizure may occur only once, or you may have them more than one time. Taking medicines as directed and following up with your doctor may help keep you from having more seizures. The doctor has checked you carefully, but problems can develop later. If you notice any problems or new symptoms, get medical treatment right away. Follow-up care is a key part of your treatment and safety. Be sure to make and go to all appointments, and call your doctor if you are having problems. It's also a good idea to know your test results and keep a list of the medicines you take. How can you care for yourself at home? · Be safe with medicines. Take your medicines exactly as prescribed. Call your doctor if you think you are having a problem with your medicine. · Do not do any activity that could be dangerous to you or others until your doctor says it is safe to do so. For example, do not drive a car, operate machinery, swim, or climb ladders. · Be sure that anyone treating you for any health problem knows that you have had a seizure and what medicines you are taking for it. · Identify and avoid things that may make you more likely to have a seizure. These may include lack of sleep, alcohol or drug use, stress, or not eating. · Make sure you go to your follow-up appointment. When should you call for help? Call 911 anytime you think you may need emergency care. For example, call if: 
· You have another seizure. · You have more than one seizure in 24 hours. · You have new symptoms, such as trouble walking, speaking, or thinking clearly. Call your doctor now or seek immediate medical care if: 
· You are not acting normally. Watch closely for changes in your health, and be sure to contact your doctor if you have any problems. Where can you learn more? Go to http://kirby-annalee.info/. Enter C783 in the search box to learn more about \"Seizure: Care Instructions. \" Current as of: October 14, 2016 Content Version: 11.3 © 3422-0294 [employer]. Care instructions adapted under license by [employer] (which disclaims liability or warranty for this information). If you have questions about a medical condition or this instruction, always ask your healthcare professional. [place] [address] any warranty or liability for your use of this information. Discharge Orders None Introducing [facility]!    
 [employer] introduces [employer] patient portal. Now you can access parts of your medical record, email your doctor's office, and request medication refills online. 1. In your internet browser, go to [email or web address] 2. Click on the First Time User? Click Here link in the Sign In box. You will see the New Member Sign Up page. 3. Enter your [employer] Access Code exactly as it appears below. You will not need to use this code after youve completed the sign-up process. If you do not sign up before the expiration date, you must request a new code. · [employer] Access Code: [email or web address] Expires: 11/5/2017  2:26 PM 
 
4. Enter the last four digits of your Social Security Number (xxxx) and Date of Birth (mm/dd/yyyy) as indicated and click Submit. You will be taken to the next sign-up page. 5. Create a [employer]ium Links ID. This will be your [employer]ium Links login ID and cannot be changed, so think of one that is secure and easy to remember. 6. Create a [employer] password. You can change your password at any time. 7. Enter your Password Reset Question and Answer. This can be used at a later time if you forget your password. 8. Enter your e-mail address. You will receive e-mail notification when new information is available in [address] 9. Click Sign Up. You can now view and download portions of your medical record. 10. Click the Download Summary menu link to download a portable copy of your medical information. If you have questions, please visit the Frequently Asked Questions section of the [employer] website. Remember, [employer] is NOT to be used for urgent needs. For medical emergencies, dial 911. Now available from your iPhone and Android! General Information Please provide this summary of care documentation to your next provider. Patient Signature:  ____________________________________________________________ Date:  ____________________________________________________________  
  
Darcy Spare  Provider Signature: ____________________________________________________________ Date:  ____________________________________________________________

## 2017-09-25 NOTE — IP AVS SNAPSHOT
Summary of Care Report The Summary of Care report has been created to help improve care coordination. Users with access to [employer] or [address] (Web-based application) may access additional patient information including the Discharge Summary. If you are not currently a [address] user and need more information, please call the number listed below in the [place] [address] section and ask to be connected with Medical Records. Facility Information Name Address Phone [address] [facility] [address] [phone] Patient Information Patient Name Sex JAJA Velasco ([id number]) Female 1995 Discharge Information Admitting Provider Service Area Unit Malgorzata Lowery MD / Arlen Irizarry 92 [facility] 2 Labor & Delivery / [phone] Discharge Provider Discharge Date/Time Discharge Disposition Destination (none) 2017 (Pending) AHR (none) Patient Language Language ENGLISH [13] Hospital Problems as of 2017  Reviewed: 2017  1:38 PM by Tarun Bolton NP Class Noted - Resolved Last Modified POA Active Problems Hypertension in pregnancy, preeclampsia, severe, postpartum condition  2017 - Present 2017 by Elfego Salinas MD Unknown Entered by Elfego Salinas MD  
  History of migraine  2017 - Present 2017 by Tarun Bolton NP Yes Entered by Tarun Bolton NP  
  * (Principal)Syncope  2017 - Present 2017 by Tarun Bolton NP Unknown Entered by Tarun Bolton NP Convulsions ([facility] [address]  2017 - Present 2017 by Sherly Briceno MD Unknown   Entered by Sherly Briceno MD  
  
Non-Hospital Problems as of 2017  Reviewed: 2017  1:38 PM by Tarun Bolton NP  
  
  
  
 Class Noted - Resolved Last Modified Active Problems Supervision of normal first pregnancy  8/7/2017 - Present 9/15/2017 by Ishmael Miller Entered by Lilliana Kyle MD  
  Overview Addendum 9/15/2017  9:25 AM by Ishmael Miller 30 week transfer IUGR 7%: Recommendations: 
-Follow-up BPP and Doppler studies on 09/14/17. 
-Delivery at 37 weeks if Doppler shows increased S/D ratio. -Delivery at diagnosis if Doppler shows absent end-diastolic flow. IOL 9/22/17  FB 9/21. Pt aware. Pregnancy  9/4/2017 - Present 9/22/2017 by Lilliana Kyle MD  
  Entered by Elpidio Stephens MD  
  
You are allergic to the following Allergen Reactions Aspirin Hoarseness Ibuprofen Itching Tylenol (Acetaminophen) Unknown (comments) Stuffy nose and scratchy throat Current Discharge Medication List  
  
START taking these medications Dose & Instructions Dispensing Information Comments NIFEdipine ER 60 mg ER tablet Commonly known as:  PROCARDIA XL Dose:  60 mg Take 1 Tab by mouth daily. Quantity:  30 Tab Refills:  11 STOP taking these medications Comments  
 labetalol 100 mg tablet Commonly known as:  Eather Kugel your doctor about these medications Dose & Instructions Dispensing Information Comments HYDROcodone-acetaminophen 5-325 mg per tablet Commonly known as:  Janas Wesco Dose:  1-2 Tab Take 1-2 Tabs by mouth every four (4) hours as needed for Pain. Max Daily Amount: 12 Tabs. Quantity:  13 Tab Refills:  0 HYDROmorphone 2 mg tablet Commonly known as:  DILAUDID Dose:  2 mg Take 1 Tab by mouth every four (4) hours as needed. Max Daily Amount: 12 mg. Quantity:  15 Tab Refills:  0 Follow-up Information Follow up With Details Comments Contact Info Alida Dahl NP Go on 10/18/2017 Neurology appt. for migraine HA mgmt, with nurse practitioner:  Arrive: 1100, Appt at: [address] [facility] [address] [place] [address] [address] 
[phone] Royce Lechuga MD In 1 day 10:30 [address] 
[phone] Discharge Instructions Learning About Preeclampsia After Childbirth What is preeclampsia? A woman with preeclampsia has blood pressure that is higher than usual. She may also have other serious symptoms. Preeclampsia can be dangerous. When it is severe, it can cause seizures (eclampsia) or liver or kidney damage. When the liver is affected, some women get HELLP syndrome, a blood-clotting and bleeding problem. HELLP can come on quickly and can be deadly. This is why your doctor checks you and your baby often. Preeclampsia usually occurs after 20 weeks of pregnancy. In rare cases, it is first noted right after childbirth. Most often, it starts near the end of pregnancy and goes away after childbirth. What are the symptoms? Mild preeclampsia usually doesn't cause symptoms. But preeclampsia can cause rapid weight gain and sudden swelling of the hands and face. Severe preeclampsia does cause symptoms. It can cause a very bad headache and trouble seeing and breathing. It also can cause belly pain. You may also urinate less than usual. 
If you have new preeclampsia symptoms after you go home from the hospital, call your doctor right away. What can you expect after you have had preeclampsia? In the hospital 
After the baby and the placenta are delivered, preeclampsia usually starts to improve. Most women get better in the first few days after childbirth. After having preeclampsia, you still have a risk of seizures for a day or more after childbirth. (Very rarely, seizures happen later on.) So your doctor may have you take magnesium sulfate for a day or more to prevent seizures. You may also take medicine to lower your blood pressure. When you go home Your blood pressure will most likely return to normal a few days after delivery. Your doctor will want to check your blood pressure sometime in the first week after you leave the hospital. 
Some women still have high blood pressure 6 weeks after childbirth. But most return to normal levels over the long term. · Take and record your blood pressure at home if your doctor tells you to. ¨ Learn the importance of the two measures of blood pressure (such as 120 over 80, or 120/80). The first number is the systolic pressure. This is the force of blood on the artery walls as the heart pumps. The second number is the diastolic pressure. This is the force of blood on the artery walls between heartbeats, when the heart is at rest. You have a choice of monitors to use. § Manual monitor: You pump up the cuff and use a stethoscope to listen for your pulse. § Electronic monitor: The cuff inflates, and a gauge shows your pulse rate. ¨ To take your blood pressure: § Ask your doctor to check your blood pressure monitor to be sure that it is accurate and that the cuff fits you. Also ask your doctor to watch you use it, to make sure that you are using it right. § You should not eat, use tobacco products, or use medicine known to raise blood pressure (such as some nasal decongestant sprays) before you take your blood pressure. § Avoid taking your blood pressure if you have just exercised or are nervous or upset. Rest at least 15 minutes before you take your blood pressure. · Be safe with medicines. If you take medicine, take it exactly as prescribed. Call your doctor if you think you are having a problem with your medicine. · Do not smoke. Quitting smoking will help lower your blood pressure and improve your baby's growth and health. If you need help quitting, talk to your doctor about stop-smoking programs and medicines. These can increase your chances of quitting for good. · Eat a balanced and healthy diet that has lots of fruits and vegetables. Long-term health After you have had preeclampsia, you have a higher-than-average risk of heart disease, stroke, and kidney disease. This may be because the same things that cause preeclampsia also cause heart and kidney disease. To protect your health, work with your doctor on living a heart-healthy lifestyle and getting the checkups you need. Your doctor may also want you to check your blood pressure at home. Follow-up care is a key part of your treatment and safety. Be sure to make and go to all appointments, and call your doctor if you are having problems. It's also a good idea to know your test results and keep a list of the medicines you take. Where can you learn more? Go to http://kirby-annalee.info/. Enter J429 in the search box to learn more about \"Learning About Preeclampsia After Childbirth. \" 
Current as of: March 16, 2017 Content Version: 11.3 © 0835-7409 [employer]. Care instructions adapted under license by [employer] (which disclaims liability or warranty for this information). If you have questions about a medical condition or this instruction, always ask your healthcare professional. [place] [address] any warranty or liability for your use of this information. Seizure: Care Instructions Your Care Instructions Seizures are caused by abnormal patterns of electrical signals in the brain. They are different for each person. Seizures can affect movement, speech, vision, or awareness. Some people have only slight shaking of a hand and do not pass out. Other people may pass out and have violent shaking of the whole body. Some people appear to stare into space. They are awake, but they can't respond normally. Later, they may not remember what happened. You may need tests to identify the type and cause of the seizures. A seizure may occur only once, or you may have them more than one time. Taking medicines as directed and following up with your doctor may help keep you from having more seizures. The doctor has checked you carefully, but problems can develop later. If you notice any problems or new symptoms, get medical treatment right away. Follow-up care is a key part of your treatment and safety. Be sure to make and go to all appointments, and call your doctor if you are having problems. It's also a good idea to know your test results and keep a list of the medicines you take. How can you care for yourself at home? · Be safe with medicines. Take your medicines exactly as prescribed. Call your doctor if you think you are having a problem with your medicine. · Do not do any activity that could be dangerous to you or others until your doctor says it is safe to do so. For example, do not drive a car, operate machinery, swim, or climb ladders. · Be sure that anyone treating you for any health problem knows that you have had a seizure and what medicines you are taking for it. · Identify and avoid things that may make you more likely to have a seizure. These may include lack of sleep, alcohol or drug use, stress, or not eating. · Make sure you go to your follow-up appointment. When should you call for help? Call 911 anytime you think you may need emergency care. For example, call if: 
· You have another seizure. · You have more than one seizure in 24 hours. · You have new symptoms, such as trouble walking, speaking, or thinking clearly. Call your doctor now or seek immediate medical care if: 
· You are not acting normally. Watch closely for changes in your health, and be sure to contact your doctor if you have any problems. Where can you learn more? Go to http://kirby-annalee.info/. Enter Q356 in the search box to learn more about \"Seizure: Care Instructions. \" Current as of: October 14, 2016 Content Version: 11.3 © 1434-3479 [employer]. Care instructions adapted under license by [employer] (which disclaims liability or warranty for this information). If you have questions about a medical condition or this instruction, always ask your healthcare professional. [place] [address] any warranty or liability for your use of this information. Chart Review Routing History Recipient Method Report Sent By Judy Mitchell MD  
Phone: [phone] In [facility] CUSTOM LAB REPORT Sruthi Lion [[id number]] 7/31/2017  3:39 PM 7/31/2017

## 2017-09-26 ENCOUNTER — APPOINTMENT (OUTPATIENT)
Dept: CT IMAGING | Age: 22
DRG: 561 | End: 2017-09-26
Attending: NURSE PRACTITIONER
Payer: COMMERCIAL

## 2017-09-26 PROBLEM — Z86.69 HISTORY OF MIGRAINE: Status: ACTIVE | Noted: 2017-09-26

## 2017-09-26 PROBLEM — R55 SYNCOPE: Status: ACTIVE | Noted: 2017-09-26

## 2017-09-26 PROBLEM — R56.9 CONVULSIONS (HCC): Status: ACTIVE | Noted: 2017-09-26

## 2017-09-26 LAB
ALBUMIN SERPL-MCNC: 2.6 G/DL (ref 3.5–5)
ALBUMIN/GLOB SERPL: 0.8 {RATIO} (ref 1.1–2.2)
ALP SERPL-CCNC: 92 U/L (ref 45–117)
ALT SERPL-CCNC: 125 U/L (ref 12–78)
ANION GAP SERPL CALC-SCNC: 9 MMOL/L (ref 5–15)
AST SERPL-CCNC: 146 U/L (ref 15–37)
BASOPHILS # BLD: 0 K/UL (ref 0–0.1)
BASOPHILS NFR BLD: 0 % (ref 0–1)
BILIRUB SERPL-MCNC: 0.3 MG/DL (ref 0.2–1)
BUN SERPL-MCNC: 7 MG/DL (ref 6–20)
BUN/CREAT SERPL: 7 (ref 12–20)
CALCIUM SERPL-MCNC: 7.9 MG/DL (ref 8.5–10.1)
CHLORIDE SERPL-SCNC: 107 MMOL/L (ref 97–108)
CO2 SERPL-SCNC: 25 MMOL/L (ref 21–32)
CREAT SERPL-MCNC: 0.99 MG/DL (ref 0.55–1.02)
EOSINOPHIL # BLD: 0.3 K/UL (ref 0–0.4)
EOSINOPHIL NFR BLD: 3 % (ref 0–7)
ERYTHROCYTE [DISTWIDTH] IN BLOOD BY AUTOMATED COUNT: 14.5 % (ref 11.5–14.5)
GLOBULIN SER CALC-MCNC: 3.4 G/DL (ref 2–4)
GLUCOSE SERPL-MCNC: 119 MG/DL (ref 65–100)
HCT VFR BLD AUTO: 33.9 % (ref 35–47)
HGB BLD-MCNC: 11.3 G/DL (ref 11.5–16)
LDH SERPL L TO P-CCNC: 381 U/L (ref 81–246)
LYMPHOCYTES # BLD: 1.8 K/UL (ref 0.8–3.5)
LYMPHOCYTES NFR BLD: 21 % (ref 12–49)
MCH RBC QN AUTO: 30.5 PG (ref 26–34)
MCHC RBC AUTO-ENTMCNC: 33.3 G/DL (ref 30–36.5)
MCV RBC AUTO: 91.4 FL (ref 80–99)
MONOCYTES # BLD: 0.6 K/UL (ref 0–1)
MONOCYTES NFR BLD: 7 % (ref 5–13)
NEUTS SEG # BLD: 5.7 K/UL (ref 1.8–8)
NEUTS SEG NFR BLD: 69 % (ref 32–75)
PLATELET # BLD AUTO: 254 K/UL (ref 150–400)
POTASSIUM SERPL-SCNC: 3.7 MMOL/L (ref 3.5–5.1)
PROT SERPL-MCNC: 6 G/DL (ref 6.4–8.2)
RBC # BLD AUTO: 3.71 M/UL (ref 3.8–5.2)
SODIUM SERPL-SCNC: 141 MMOL/L (ref 136–145)
TSH SERPL DL<=0.05 MIU/L-ACNC: 1.32 UIU/ML (ref 0.36–3.74)
URATE SERPL-MCNC: 11.6 MG/DL (ref 2.6–6)
WBC # BLD AUTO: 8.3 K/UL (ref 3.6–11)

## 2017-09-26 PROCEDURE — 83615 LACTATE (LD) (LDH) ENZYME: CPT | Performed by: OBSTETRICS & GYNECOLOGY

## 2017-09-26 PROCEDURE — 85025 COMPLETE CBC W/AUTO DIFF WBC: CPT | Performed by: OBSTETRICS & GYNECOLOGY

## 2017-09-26 PROCEDURE — 65270000029 HC RM PRIVATE

## 2017-09-26 PROCEDURE — 84550 ASSAY OF BLOOD/URIC ACID: CPT | Performed by: OBSTETRICS & GYNECOLOGY

## 2017-09-26 PROCEDURE — 70450 CT HEAD/BRAIN W/O DYE: CPT

## 2017-09-26 PROCEDURE — 74011250636 HC RX REV CODE- 250/636: Performed by: OBSTETRICS & GYNECOLOGY

## 2017-09-26 PROCEDURE — 74011250636 HC RX REV CODE- 250/636

## 2017-09-26 PROCEDURE — 84443 ASSAY THYROID STIM HORMONE: CPT | Performed by: NURSE PRACTITIONER

## 2017-09-26 PROCEDURE — 36415 COLL VENOUS BLD VENIPUNCTURE: CPT | Performed by: NURSE PRACTITIONER

## 2017-09-26 PROCEDURE — 74011250637 HC RX REV CODE- 250/637: Performed by: OBSTETRICS & GYNECOLOGY

## 2017-09-26 PROCEDURE — 80053 COMPREHEN METABOLIC PANEL: CPT | Performed by: OBSTETRICS & GYNECOLOGY

## 2017-09-26 RX ORDER — LABETALOL 100 MG/1
100 TABLET, FILM COATED ORAL 2 TIMES DAILY
Status: DISCONTINUED | OUTPATIENT
Start: 2017-09-26 | End: 2017-09-27

## 2017-09-26 RX ORDER — HYDRALAZINE HYDROCHLORIDE 20 MG/ML
10 INJECTION INTRAMUSCULAR; INTRAVENOUS ONCE
Status: COMPLETED | OUTPATIENT
Start: 2017-09-26 | End: 2017-09-26

## 2017-09-26 RX ORDER — HYDRALAZINE HYDROCHLORIDE 20 MG/ML
INJECTION INTRAMUSCULAR; INTRAVENOUS
Status: COMPLETED
Start: 2017-09-26 | End: 2017-09-26

## 2017-09-26 RX ORDER — DIPHENHYDRAMINE HCL 25 MG
25 CAPSULE ORAL
Status: DISCONTINUED | OUTPATIENT
Start: 2017-09-26 | End: 2017-09-27 | Stop reason: HOSPADM

## 2017-09-26 RX ORDER — SODIUM CHLORIDE, SODIUM LACTATE, POTASSIUM CHLORIDE, CALCIUM CHLORIDE 600; 310; 30; 20 MG/100ML; MG/100ML; MG/100ML; MG/100ML
25 INJECTION, SOLUTION INTRAVENOUS CONTINUOUS
Status: DISCONTINUED | OUTPATIENT
Start: 2017-09-26 | End: 2017-09-27 | Stop reason: HOSPADM

## 2017-09-26 RX ADMIN — LABETALOL HCL 100 MG: 100 TABLET, FILM COATED ORAL at 18:06

## 2017-09-26 RX ADMIN — HYDRALAZINE HYDROCHLORIDE 10 MG: 20 INJECTION INTRAMUSCULAR; INTRAVENOUS at 00:21

## 2017-09-26 RX ADMIN — MAGNESIUM SULFATE 2 G/HR: 500 INJECTION, SOLUTION INTRAMUSCULAR; INTRAVENOUS at 05:15

## 2017-09-26 RX ADMIN — MAGNESIUM SULFATE 2 G/HR: 500 INJECTION, SOLUTION INTRAMUSCULAR; INTRAVENOUS at 17:10

## 2017-09-26 RX ADMIN — MAGNESIUM SULFATE 2 G/HR: 500 INJECTION, SOLUTION INTRAMUSCULAR; INTRAVENOUS at 16:03

## 2017-09-26 RX ADMIN — SODIUM CHLORIDE, SODIUM LACTATE, POTASSIUM CHLORIDE, AND CALCIUM CHLORIDE 25 ML/HR: 600; 310; 30; 20 INJECTION, SOLUTION INTRAVENOUS at 11:34

## 2017-09-26 RX ADMIN — DIPHENHYDRAMINE HYDROCHLORIDE 25 MG: 25 CAPSULE ORAL at 21:09

## 2017-09-26 RX ADMIN — LABETALOL HCL 100 MG: 100 TABLET, FILM COATED ORAL at 09:17

## 2017-09-26 RX ADMIN — MAGNESIUM SULFATE 2 G/HR: 500 INJECTION, SOLUTION INTRAMUSCULAR; INTRAVENOUS at 10:27

## 2017-09-26 NOTE — PROGRESS NOTES
Problem: Falls - Risk of  Goal: *Absence of Falls  Document Adam Fall Risk and appropriate interventions in the flowsheet.    Outcome: Progressing Towards Goal  Fall Risk Interventions:                    Elimination Interventions: Call light in reach, Patient to call for help with toileting needs

## 2017-09-26 NOTE — PROGRESS NOTES
Ante Partum Progress Note    Kimaniwaldo Aase    Patient states she has no new complaints. She denies h/a, blurred vision, sob, cp, or other complaints at this time. Vitals:  Visit Vitals    /83    Pulse 100    Temp 98.5 °F (36.9 °C)    Ht 5' 3\" (1.6 m)    Wt 262 lb (118.8 kg)    SpO2 93%    Breastfeeding Yes    BMI 46.41 kg/m2     Temp (24hrs), Av.5 °F (36.9 °C), Min:98.5 °F (36.9 °C), Max:98.5 °F (36.9 °C)      Last 24hr Input/Output:    Intake/Output Summary (Last 24 hours) at 17  Last data filed at 17   Gross per 24 hour   Intake                0 ml   Output             2200 ml   Net            -2200 ml        Exam:  Patient without distress. Abdomen, fundus soft non-tender     Extremities, no redness or tenderness             Labs:     Lab Results   Component Value Date/Time    WBC 9.8 2017 01:16 PM    WBC 6.3 2017 04:17 PM    WBC 7.0 2017 02:54 PM    HGB 12.7 2017 01:16 PM    HGB 12.6 2017 04:17 PM    HGB 12.3 2017 02:54 PM    HCT 38.1 2017 01:16 PM    HCT 36.8 2017 04:17 PM    HCT 36.9 2017 02:54 PM    PLATELET 611  01:16 PM    PLATELET 222  04:17 PM    PLATELET 209  02:54 PM    Hgb, External 12.3 2017    Hct, External 36.9 2017    Platelet cnt., External 279 2017       No results found for this or any previous visit (from the past 24 hour(s)). Assessment/Plan: PPD #4 s/p  with eclamptic seizure last night. On Magnesium now. BP's stable. WIll continue Magnesium for 24 hours. Will consult neurology and consider head CT as seizure occurred >48 hours after delivery.

## 2017-09-26 NOTE — LACTATION NOTE
Discussed mothers feeding choice with nurse, nurse Red Martin RN states that mother is bottle feeding formula and does not wish to pump or breastfeed.

## 2017-09-26 NOTE — DISCHARGE SUMMARY
Obstetrical Discharge Summary     Name: Betsy Saeed MRN: [id number]  SSN: [id number]    YOB: 1995  Age: 25 y.o. Sex: female      Admit Date: 2017    Discharge Date: 2017     Admitting Physician: Kodi Russ MD     Attending Physician:  Kodi Russ MD    * Admission Diagnoses: Pregnancy;Pregnancy    * Discharge Diagnoses:   Information for the patient's :  Alex Yeager, Male [[id number]]   Delivery of a 5 lb 0.6 oz (2.285 kg) male infant via Vaginal, Spontaneous Delivery on 2017 at 2:15 PM  by . Apgars were 8 and 9. Additional Diagnoses:   Hospital Problems as of 2017  Date Reviewed: 2017          Codes Class Noted - Resolved POA    Pregnancy ICD-10-CM: Z33.1  ICD-9-CM: V22.2  2017 - Present Unknown             Lab Results   Component Value Date/Time    Rubella, External IMMUNE 2017    GrBStrep, External negative 2017    ABO,Rh O POSITIVE 2017    There is no immunization history for the selected administration types on file for this patient. * Procedures: IOL for IUGR with . [place]  Depression Scale  I have been able to laugh and see the funny side of things: As much as I always could  I have looked forward with enjoyment to things: As much as I ever did  I have blamed myself unnecessarily when things went wrong: Yes, some of the time  I have been anxious or worried for no good reason: No, not at all  I have felt scared or panicky for no very good reason: No, not at all  Things have been getting on top of me: No, I have been coping as well as ever  I have been so unhappy that I have had difficulty sleeping: No, not at all  I have felt sad or miserable: No, not at all  I have been so unhappy that I have been crying: No, never  The thought of harming myself has occurred to me: Never  Total Score: 2    * Discharge Condition: stable    * Hospital Course: Pt underwent IOL for IUGR.   She was noted to have elevated BP's and was treated with labetalol and start on Magnesium for Seizure prophylaxis. BP's improved and were mild range at the time of discharge. She was scheduled to f/u in 1 week for a BP check. * Disposition: home with office follow-up    Discharge Medications:   Discharge Medication List as of 9/24/2017 10:08 AM      START taking these medications    Details   labetalol (NORMODYNE) 100 mg tablet Take 1 Tab by mouth every twelve (12) hours. , Print, Disp-60 Tab, R-1      HYDROmorphone (DILAUDID) 2 mg tablet Take 1 Tab by mouth every four (4) hours as needed. Max Daily Amount: 12 mg., Print, Disp-15 Tab, R-0         CONTINUE these medications which have NOT CHANGED    Details   HYDROcodone-acetaminophen (NORCO) 5-325 mg per tablet Take 1-2 Tabs by mouth every four (4) hours as needed for Pain. Max Daily Amount: 12 Tabs., Print, Disp-13 Tab, R-0             * Follow-up Care/Patient Instructions:   Activity: activity as tolerated  Diet: general  Wound Care: as directed    Follow-up Information     Follow up With Details Comments Contact Info    Andrzej Mendoza MD In 4 days Post-Partum BP Check [address]      Andrzej Mendoza MD In 6 weeks [address] [place] [address]  [phone]             Signed By:  Andrzej Mendoza MD     September 26, 2017

## 2017-09-26 NOTE — PROGRESS NOTES
Bedside and Verbal shift change report given to LUISA Bryan RN (oncoming nurse) by Duong Pete RN (offgoing nurse). Report included the following information SBAR, Kardex, Procedure Summary and Intake/Output.

## 2017-09-26 NOTE — PROCEDURES
Patient Name: Yann Maya  : 1995  Age: 25 y.o. Ordering physician: No ref. provider found  Date of EE2017  EEG procedure number: [id number]  [address] [facility] physician: Noelle Briceno MD      ELECTROENCEPHALOGRAM REPORT     PROCEDURE: EEG. CLINICAL INDICATION: The patient is a 25 y.o. female with a history of   possible seizures. EEG to rule out seizures, rule out stroke, rule out   cortical abnormality. EEG CLASSIFICATION: Essentially normal    DESCRIPTION OF THE RECORD:   The background of this recording contains a posteriorly-located occipital alpha rhythm of 10 Hz that attenuates with eye opening. Throughout the recording, there were no clear areas of focal slowing nor spike or spike-and-wave discharges seen. Hyperventilation was not performed. Photic stimulation produced no response. During the recording the patient did not achieve stage II sleep    INTERPRETATION: This is a normal electroencephalogram showing no clear focal abnormalities or epileptiform activity. A normal EEG doesn't not rule out seizures. Clinical correlation recommended.         Isabel Bustillo MD  2017  5:05 PM

## 2017-09-26 NOTE — H&P
Gynecology History and Physical    Name: Tarun Saleh MRN: [id number] SSN: [id number]    YOB: 1995  Age: 25 y.o. Sex: female       Subjective:      Chief complaint:  Seizure activity    Marlon Reddy is a 25 y.o.  female presents  with a history of seizure activity 1 day after discharge. Her labor course was significant for induction due to IUGR. She had elevated pressures and was placed on magnesium. The patient reports  A severe headache most of the day, visual disturbances and seizures. She took 1 dose of BP medication prior to re-admission    The current method of family planning is none. OB History      Para Term  AB Living    1 1 1   1    SAB TAB Ectopic Molar Multiple Live Births        0 1        Past Medical History:   Diagnosis Date    Migraine     Trauma     fell and dislocated r shoulder     No past surgical history on file. Social History     Occupational History    Not on file. Social History Main Topics    Smoking status: Never Smoker    Smokeless tobacco: Never Used    Alcohol use No    Drug use: No    Sexual activity: Yes     Partners: Male     Birth control/ protection: None     Family History   Problem Relation Age of Onset    No Known Problems Mother         Allergies   Allergen Reactions    Aspirin Hoarseness    Ibuprofen Itching    Tylenol [Acetaminophen] Unknown (comments)     Stuffy nose and scratchy throat     Prior to Admission medications    Medication Sig Start Date End Date Taking? Authorizing Provider   HYDROcodone-acetaminophen (NORCO) 5-325 mg per tablet Take 1-2 Tabs by mouth every four (4) hours as needed for Pain. Max Daily Amount: 12 Tabs. 3/4/15  Yes Radha Pisano MD   labetalol (NORMODYNE) 100 mg tablet Take 1 Tab by mouth every twelve (12) hours. 17   Irais Petersen MD   HYDROmorphone (DILAUDID) 2 mg tablet Take 1 Tab by mouth every four (4) hours as needed.  Max Daily Amount: 12 mg. 17   Jodi KC Trey Leroy MD        Review of Systems  A comprehensive review of systems was negative except for that written in the History of Present Illness. Objective:     Vitals:    09/25/17 2255   Weight: 118.8 kg (262 lb)   Height: 5' 3\" (1.6 m)       Physical Exam:  Patient without distress.  Post ictal. Reports visual distrubances  Heart: Regular rate and rhythm  Lung: clear to auscultation throughout lung fields, no wheezes, no rales, no rhonchi and normal respiratory effort  Abdomen: soft, nontender  External Genitalia: normal general appearance    Assessment/Plan:     Post partum eclampsia  Plan admission  Plan magnesium  Labetalol for BP management      Signed By:  Arleen Cheng MD     September 25, 2017

## 2017-09-26 NOTE — CONSULTS
[facility]  [address] [place] [address] Shi  [phone]  ==============================================================    Attending Addendum    Reviewed consult note by GARY Spring. Agree with hx as obtained by her. Patient independently interviewed and examined. HPI  This is a 78-year-old female who presented with severe migraine and new onset seizure. Patient delivered a healthy son on Friday evening and was discharged home on Monday morning. Patient reports she was at home on the phone with her boyfriend when she felt funny. She told him to call 911. Her mother arrived and patient was on the floor. Patient told her she was about to have another seizure. Patient then was noted to have eyes rolling back and following convulsion with foaming at the mouth for 2 minutes per mom. She was unresponsive during this episode but came back around fairly quickly. Family was called and patient was brought to the emergency room. She was admitted and treated with magnesium as she does have a history of preeclampsia. Patient has had no further episodes since being hospitalized. She did have a CT of the head that was negative and an EEG that was normal.  Patient does have a history of migraine headaches. She has a severe migraine headache currently. She reports that her headaches can be either side of the head. She is supposed to be taking Topamax but feels like it is not helping so does not take this. Patient reports her headache is throbbing pressure sensation. She will have associated nausea. She did have vomiting with this current migraine. Patient has no known epilepsy risk factors. MEDS  No current facility-administered medications on file prior to encounter. Current Outpatient Prescriptions on File Prior to Encounter   Medication Sig Dispense Refill    HYDROcodone-acetaminophen (NORCO) 5-325 mg per tablet Take 1-2 Tabs by mouth every four (4) hours as needed for Pain.  Max Daily Amount: 12 Tabs. 13 Tab 0    labetalol (NORMODYNE) 100 mg tablet Take 1 Tab by mouth every twelve (12) hours. 60 Tab 1    HYDROmorphone (DILAUDID) 2 mg tablet Take 1 Tab by mouth every four (4) hours as needed. Max Daily Amount: 12 mg. 15 Tab 0       CLINICAL DATA REVIEW  IMAGING: CT head negative (I personally reviewed these images in PACS and this is my impression)  EEG: Normal      LABS  Results for orders placed or performed during the hospital encounter of 09/25/17   TSH 3RD GENERATION   Result Value Ref Range    TSH 1.32 0.36 - 3.74 uIU/mL   CBC WITH AUTOMATED DIFF   Result Value Ref Range    WBC 8.3 3.6 - 11.0 K/uL    RBC 3.71 (L) 3.80 - 5.20 M/uL    HGB 11.3 (L) 11.5 - 16.0 g/dL    HCT 33.9 (L) 35.0 - 47.0 %    MCV 91.4 80.0 - 99.0 FL    MCH 30.5 26.0 - 34.0 PG    MCHC 33.3 30.0 - 36.5 g/dL    RDW 14.5 11.5 - 14.5 %    PLATELET 943 551 - 484 K/uL    NEUTROPHILS 69 32 - 75 %    LYMPHOCYTES 21 12 - 49 %    MONOCYTES 7 5 - 13 %    EOSINOPHILS 3 0 - 7 %    BASOPHILS 0 0 - 1 %    ABS. NEUTROPHILS 5.7 1.8 - 8.0 K/UL    ABS. LYMPHOCYTES 1.8 0.8 - 3.5 K/UL    ABS. MONOCYTES 0.6 0.0 - 1.0 K/UL    ABS. EOSINOPHILS 0.3 0.0 - 0.4 K/UL    ABS. BASOPHILS 0.0 0.0 - 0.1 K/UL   METABOLIC PANEL, COMPREHENSIVE   Result Value Ref Range    Sodium 141 136 - 145 mmol/L    Potassium 3.7 3.5 - 5.1 mmol/L    Chloride 107 97 - 108 mmol/L    CO2 25 21 - 32 mmol/L    Anion gap 9 5 - 15 mmol/L    Glucose 119 (H) 65 - 100 mg/dL    BUN 7 6 - 20 MG/DL    Creatinine 0.99 0.55 - 1.02 MG/DL    BUN/Creatinine ratio 7 (L) 12 - 20      GFR est AA >60 >60 ml/min/1.73m2    GFR est non-AA >60 >60 ml/min/1.73m2    Calcium 7.9 (L) 8.5 - 10.1 MG/DL    Bilirubin, total 0.3 0.2 - 1.0 MG/DL    ALT (SGPT) 125 (H) 12 - 78 U/L    AST (SGOT) 146 (H) 15 - 37 U/L    Alk.  phosphatase 92 45 - 117 U/L    Protein, total 6.0 (L) 6.4 - 8.2 g/dL    Albumin 2.6 (L) 3.5 - 5.0 g/dL    Globulin 3.4 2.0 - 4.0 g/dL    A-G Ratio 0.8 (L) 1.1 - 2.2     LD   Result Value Ref Range     (H) 81 - 246 U/L   URIC ACID   Result Value Ref Range    Uric acid 11.6 (H) 2.6 - 6.0 MG/DL       PHYSICAL EXAM  Patient Vitals for the past 8 hrs:   Temp Pulse Resp BP SpO2   09/26/17 2135 - - - - 97 %   09/26/17 2130 - - - - 97 %   09/26/17 2125 - - - - 98 %   09/26/17 2120 - - - - 98 %   09/26/17 2115 - - - - 97 %   09/26/17 2110 - - - - 98 %   09/26/17 2109 - 88 - 167/82 -   09/26/17 2105 - - - - 97 %   09/26/17 1808 - (!) 101 - 159/83 -   09/26/17 1708 98.3 °F (36.8 °C) 91 14 136/75 -   09/26/17 1608 - 100 - (!) 152/91 -   09/26/17 1504 - (!) 101 - 142/84 -       General:  Alert, cooperative, no distress. Head:  Normocephalic, without obvious abnormality, atraumatic. Eyes:  Conjunctivae/corneas clear. Pupils equal, round, reactive to light. Extraocular movements intact, VFF, NO papilledema   Lungs:  Heart:   Non labored breathing  Regular rate and rhythm, no carotid bruits   Abdomen:   Soft, non-distended   Extremities: Extremities normal, atraumatic, no cyanosis or edema. Pulses: 2+ and symmetric all extremities. Skin: Skin color, texture, turgor normal. No rashes or lesions.    Neurologic:  Gen: Attention normal             Language: naming, repetition, fluency normal             Memory: intact recent and remote memory  Cranial Nerves:  I: smell Not tested   II: visual fields Full to confrontation   II: pupils Equal, round, reactive to light   II: optic disc No papilledema   III,VII: ptosis none   III,IV,VI: extraocular muscles  Full ROM   V: mastication normal   V: facial light touch sensation  normal   VII: facial muscle function   symmetric   VIII: hearing symmetric   IX: soft palate elevation  normal   XI: trapezius strength  5/5   XI: sternocleidomastoid strength 5/5   XI: neck flexion strength  5/5   XII: tongue  midline     Motor: normal bulk and tone, no tremor              Strength: 5/5 all four extremities  Sensory: intact to LT, PP, vibration, and temperature  Coordination: FTN and HTS intact, Rhomberg negative  Gait: Deferred  Reflexes: 2+ throughout       IMPRESSION:  This is a 26-year-old female who is 3 days postpartum and had 2 seizures. Only change was that she was given Dilaudid for pain control. Patient has no known epilepsy risk factors. Neuro exam is normal.  Labs are normal.  EEG and CT head were negative. Patient has been given magnesium and is doing well. At this point she does not require any additional AEDs. If patient has recurrent seizure at this point would recommend starting Keppra. Additionally patient does have severe migraines. I think she would benefit from treatment for this with Topamax. Will initiate this as an outpatient since the think she would do well with extended release Topamax versus the generic. RECOMMENDATIONS:  1.  CT head negative  2. EEG normal  3. Seizure precautions  4. Continue magnesium for now  5. If patient has additional seizure with start Keppra 500 mg twice daily  6. As an outpatient will start patient on Trokendi XR for migraine prevention    Thank you very much for this consultation. No further neurological workup recommended at this time. Will sign off. Patient is to follow-up in the neurology clinic in 2-3 weeks. Please call if further questions. Good Atkins MD  September 26, 2017     Inpatient Neurology Consult  [facility]    Name:   Sancho Reyes   Medical record #: [id number]  Admission Date: 9/25/2017  Consult Date:  09/26/17    Referring Provider: Dr. Nikky Mayes  Chief Complaint:  Syncope vs. seizure  Source of Hx:  Chart, pt and RN Mandy Gomez  _____________________________________________________________________    HISTORY OF PRESENT ILLNESS:   Subjective  Sancho Reyes is a 25 y.o. female with PMH of pre-eclampsia and migraine HA. The Neurology Service is asked to evaluate for potential seizure.   Patient delivered her son Friday night and discharged Monday PM from [facility]. She describes while in hospital yesterday AM having the worst HA of her life, blurry vision with NV x 3. She was discharged from [facility] last night close to 8pm.  HA pain was throbbing located all over her head, and she felt generally weak all over. Saw Neurology once in [address], was started on Topamax but doesn't know if it helped because she wasn't on it long enough. States after being sent home she still had her headache which resolved this AM on its own. She was on the phone with her fiance who called EMS for her. She had difficulty describing chain of events but states she was sitting on her bed, said she had mild trouble breathing at one point -- (denied anxiety but admitted to it later), decided to get on the floor which may help her and she said she passed out and her tongue was at the back of her throat causing her breathing difficulties (she showed me how her tongue likely looked in her mouth). She said her neck was shaking, then she said her whole body was shaking but she knew her mother and 10yo brother were in the background because she heard them talking. She doesn't remember how she got into ambulance but woke up in ambulance. Feels better today with headache being gone. Denies event like this before. BP on arrival was 186/105. Past Medical History:   Diagnosis Date    Hypertension in pregnancy, preeclampsia, severe, postpartum condition 9/25/2017    Migraine     Trauma 2014    fell and dislocated r shoulder     No past surgical history on file. Family History   Problem Relation Age of Onset    No Known Problems Mother      Social History     Social History    Marital status: SINGLE     Spouse name: N/A    Number of children: N/A    Years of education: N/A     Occupational History    Not on file.      Social History Main Topics    Smoking status: Never Smoker    Smokeless tobacco: Never Used    Alcohol use No    Drug use: No    Sexual activity: Yes Partners: Male     Birth control/ protection: None     Other Topics Concern    Not on file     Social History Narrative       Objective  Allergies: Allergies   Allergen Reactions    Aspirin Hoarseness    Ibuprofen Itching    Tylenol [Acetaminophen] Unknown (comments)     Stuffy nose and scratchy throat     Outpatient Meds  No current facility-administered medications on file prior to encounter. Current Outpatient Prescriptions on File Prior to Encounter   Medication Sig Dispense Refill    HYDROcodone-acetaminophen (NORCO) 5-325 mg per tablet Take 1-2 Tabs by mouth every four (4) hours as needed for Pain. Max Daily Amount: 12 Tabs. 13 Tab 0    labetalol (NORMODYNE) 100 mg tablet Take 1 Tab by mouth every twelve (12) hours. 60 Tab 1    HYDROmorphone (DILAUDID) 2 mg tablet Take 1 Tab by mouth every four (4) hours as needed.  Max Daily Amount: 12 mg. 15 Tab 0       Inpatient Meds    Current Facility-Administered Medications:     labetalol (NORMODYNE) tablet 100 mg, 100 mg, Oral, BID, Oma Lane MD, 100 mg at 09/26/17 0917    calcium gluconate 1 g in 0.9% sodium chloride 100 mL IVPB, 1 g, IntraVENous, PRN, Julius Murphy MD    sodium chloride (NS) flush 5-10 mL, 5-10 mL, IntraVENous, Q8H, Julius Murphy MD, Stopped at 09/26/17 0000    sodium chloride (NS) flush 5-10 mL, 5-10 mL, IntraVENous, PRN, Julius Murphy MD    magnesium sulfate 10 gm/250 mL D5W infusion, 2 g/hr, IntraVENous, CONTINUOUS, Julius Murphy MD, Last Rate: 50 mL/hr at 09/26/17 1027, 2 g/hr at 09/26/17 1027    PHYSICAL EXAM  Patient Vitals for the past 12 hrs:   Temp Pulse Resp BP SpO2   09/26/17 1033 - 100 - 139/73 -   09/26/17 1003 - 90 - 137/77 93 %   09/26/17 0933 - 83 - 146/89 94 %   09/26/17 0903 - 90 - 140/79 -   09/26/17 0833 - 100 - 147/83 93 %   09/26/17 0808 98 °F (36.7 °C) 91 14 137/75 -   09/26/17 0658 - 96 - 131/68 -   09/26/17 0652 - - - - 94 %   09/26/17 0651 - - - - 94 %   09/26/17 0648 - 86 - 143/72 -   09/26/17 0647 - - - - 94 %   09/26/17 0646 - - - - 94 %   09/26/17 0642 - - - - 93 %   09/26/17 0638 - 86 - 131/66 94 %   09/26/17 0633 - - - - 94 %   09/26/17 0628 - 88 - 134/66 -   09/26/17 0627 - - - - 92 %   09/26/17 0622 - - - - 94 %   09/26/17 0618 - 90 - 134/70 -   09/26/17 0617 - - - - 93 %   09/26/17 0613 - - - - 94 %   09/26/17 0612 - - - - 94 %   09/26/17 0608 - 95 - 137/71 94 %   09/26/17 0603 - - - - 94 %   09/26/17 0602 - - - - 93 %   09/26/17 0558 - 91 - 135/68 -   09/26/17 0557 - - - - 91 %   09/26/17 0552 - - - - 93 %   09/26/17 0548 - 99 - 129/68 -   09/26/17 0547 - - - - 92 %   09/26/17 0545 - - - - 94 %   09/26/17 0542 - - - - 95 %   09/26/17 0538 - 93 - 131/67 -   09/26/17 0537 - - - - 93 %   09/26/17 0536 - - - - 94 %   09/26/17 0532 - - - - 95 %   09/26/17 0528 - 97 - 128/64 -   09/26/17 0527 - - - - 90 %   09/26/17 0522 - - - - 93 %   09/26/17 0518 - 93 - 130/67 -   09/26/17 0517 - - - - 91 %   09/26/17 0512 - - - - 94 %   09/26/17 0510 - - - - 94 %   09/26/17 0508 - 90 - 130/69 -   09/26/17 0507 - - - - 93 %   09/26/17 0504 - - - - 94 %   09/26/17 0502 - - - - 95 %   09/26/17 0458 - 93 - 131/68 -   09/26/17 0457 - - - - 92 %   09/26/17 0452 - - - - 94 %   09/26/17 0448 - 92 - 128/68 -   09/26/17 0447 - - - - 93 %   09/26/17 0442 - - - - 96 %   09/26/17 0438 - (!) 101 - 133/72 -   09/26/17 0437 - - - - 94 %   09/26/17 0433 - - - - 94 %   09/26/17 0432 - - - - 95 %   09/26/17 0428 - 99 - 134/72 -   09/26/17 0427 - - - - 96 %   09/26/17 0424 - - - - 94 %   09/26/17 0422 - - - - 94 %   09/26/17 0418 - 88 - 133/74 94 %   09/26/17 0412 - - - - 94 %   09/26/17 0408 - (!) 102 - 139/72 -   09/26/17 0407 - - - - 90 %   09/26/17 0405 - - - - 94 %   09/26/17 0402 - - - - 93 %   09/26/17 0358 - (!) 108 - 141/74 -   09/26/17 0357 - - - - (!) 89 %   09/26/17 0352 - - - - 93 %   09/26/17 0348 - 95 - 136/70 -   09/26/17 0347 - - - - 92 %   09/26/17 0342 - - - - 94 %   09/26/17 0339 - - - - 94 % 09/26/17 0338 - 92 - 131/70 -   09/26/17 0337 - - - - 93 %   09/26/17 0333 - - - - 94 %   09/26/17 0328 - 96 - 130/69 94 %   09/26/17 0322 - - - - 94 %   09/26/17 0318 - (!) 109 - 139/80 -   09/26/17 0317 - - - - 93 %   09/26/17 0312 - - - - 93 %   09/26/17 0308 - 92 - 131/66 -   09/26/17 0307 - - - - (!) 89 %   09/26/17 0302 - - - - 93 %   09/26/17 0258 - 96 - 135/67 -   09/26/17 0257 - - - - 90 %   09/26/17 0253 - - - - 94 %   09/26/17 0252 - - - - 93 %   09/26/17 0248 - 93 - 141/70 (!) 89 %   09/26/17 0247 - - - - 91 %   09/26/17 0242 - - - - 94 %   09/26/17 0241 - - - - 94 %   09/26/17 0238 - (!) 101 - 142/73 -   09/26/17 0237 - - - - 92 %   09/26/17 0234 - - - - 94 %   09/26/17 0232 - - - - 93 %   09/26/17 0228 - 93 - 136/69 -   09/26/17 0227 - - - - 90 %   09/26/17 0222 - - - - 93 %   09/26/17 0221 - - - - 94 %   09/26/17 0218 - 90 - 141/75 -   09/26/17 0217 - - - - 92 %   09/26/17 0212 - - - - 93 %   09/26/17 0210 - - - - 94 %   09/26/17 0208 - 89 - 143/79 -   09/26/17 0207 - - - - 91 %   09/26/17 0203 - - - - 94 %   09/26/17 0158 - 100 - 143/80 -   09/26/17 0157 - - - - 90 %   09/26/17 0152 - - - - 93 %   09/26/17 0148 - 93 - 143/78 -   09/26/17 0147 - - - - 91 %   09/26/17 0142 - - - - 93 %   09/26/17 0138 - 86 - 152/84 -   09/26/17 0137 - - - - 93 %   09/26/17 0132 - - - - 93 %   09/26/17 0128 - 100 - 149/84 -   09/26/17 0127 - - - - 93 %   09/26/17 0122 - - - - 93 %   09/26/17 0118 - 88 - 147/81 (!) 89 %   09/26/17 0112 - - - - 93 %   09/26/17 0108 - 91 - 152/84 -   09/26/17 0107 - - - - 92 %   09/26/17 0102 - - - - 93 %   09/26/17 0058 - 98 - 156/86 94 %   09/26/17 0057 - - - - 94 %   09/26/17 0052 - - - - 91 %   09/26/17 0048 - 95 - 146/83 -   09/26/17 0047 - - - - 90 %   09/26/17 0042 - - - - 91 %   09/26/17 0038 - 93 - 153/87 -   09/26/17 0037 - - - - 91 %   09/26/17 0032 - - - - 92 %   09/26/17 0029 - - - - 94 %   09/26/17 0028 - 90 - 158/89 -   09/26/17 0027 - - - - 92 %   09/26/17 0022 - - - - 92 %   09/26/17 0021 - 83 - (!) 166/95 -   09/26/17 0018 - 89 - (!) 164/94 -   09/26/17 0017 - - - - 91 %   09/26/17 0012 - - - - 93 %   09/26/17 0008 - 83 - (!) 166/95 -   09/26/17 0007 - - - - 90 %   09/26/17 0002 - - - - 92 %   09/25/17 2358 - 85 - 158/90 -   09/25/17 2357 - - - - 90 %   09/25/17 2352 - - - - 93 %   09/25/17 2347 - - - - 92 %   09/25/17 2345 - 85 - (!) 157/91 -   09/25/17 2342 - - - - 94 %   09/25/17 2340 - (!) 105 - (!) 171/106 -   09/25/17 2337 - - - - 93 %   09/25/17 2335 - 92 - (!) 180/110 -   09/25/17 2332 - - - - 99 %   09/25/17 2330 98.5 °F (36.9 °C) 85 - (!) 176/107 -   09/25/17 2327 - - - - 100 %   09/25/17 2325 - 92 - (!) 174/106 -   09/25/17 2323 - 84 - (!) 176/99 -   09/25/17 2322 - - - - 100 %   09/25/17 2320 - 90 - (!) 182/103 -        General: obese AAF in NAD, providing decent history    Psych: Affect is calm  With mild anxiety but is cooperative and pleasant    Neck: supple, nontender,  No bruit   Heart: regular rhythm and rate     Lungs: clear BBS   Extremities: +1 LE edema Skin: no rashes      Neurological Examination:    Mental Status:  Alert, oriented x 4, Good insight and judgement        Commands:  following    Language:  Comprehension: intact      Speech:   No dysarthria or  aphasia     Cranial Nerves:            I: smell   Not tested    II: visual acuity    deferred    II: visual fields   Full to confrontation    II: pupils   Equal, round, reactive to light    II: optic disc   Not examined    III,VII:   no ptosis of either eyelid    III,IV,VI: extraocular muscles    Full EOM, no nystagmus, no intranuclear opthalmoplegia    V: mastication   symmetrical    V: facial sensation:    Equal V1, V2 and V3 bilaterally with LT    VII: facial muscle function     Symmetric, no facial droop    VIII: hearing   Equal bilaterally    IX: soft palate elevation    Uvula midline, elevates symetrically    XI: trapezius strength    5/5    XI: sternocleidomastoid strength   5/5    XI: neck flexion strength    5/5     XII: tongue    Protrudes midline, no fasciculations or atrophy      Strength/Motor   Left:   Proximal:   5 /5     Distal:  5  /5    Right:    Proximal:  5  /5     Distal:   5 /5      Drift:       None             Bulk:  appears symmetric            Tone:  normal      Reflexes:    BR Brachial Patellar Achilles Babinski Startle Glabellar   L 2/4+ 2/4+ 2/4+ NT  downgoing NT NT   R 2/4+ 2/4+ 2/4+ NT downgoing NT NT      Sensory: intact on proximal & distal extremity w/ LT, pressure, temp, and proprioception bilaterally   Coordination: FNF: Intact bilaterally    Heel to shin:  Intact bilaterally    Tremors:  no resting tremors, no intention tremors   Gait: deferred   *JUSTIN :  Unable to assess due to reduced comprehension, agitation or reduced LOC. Labs Reviewed  No results found for this or any previous visit (from the past 12 hour(s)). Imaging  Reviewed:   CT Results (recent):    Results from Hospital Encounter encounter on 02/16/16   CT HEAD WO CONT   Narrative **Final Report**      ICD Codes / Adm. Diagnosis: 671718   / Migraine  Headache  Examination:  CT HEAD WO CON  - [id number] - Feb 16 2016  3:41PM  Accession No:  [id number]  Reason:  Pain      REPORT:  Indication:  Pain with migraine, headache    Comparison: None    Findings: 5 mm axial images were obtained from the skull base through the   vertex. The ventricles and cortical sulci are appropriate in size and   configuration. There is no evidence of intracranial hemorrhage, mass, mass   effect, or acute infarct. No extra-axial fluid collections are seen. There   is complete opacification of the bilateral frontal sinuses, the bilateral   ethmoid air cells, the bilateral sphenoid sinuses, and the visualized right   maxillary sinus. Some of the secretions are hyperdense, which may represent   inspissated secretions. There is sinus mucosal thickening of the left   maxillary sinus. . The orbital structures are unremarkable.  No osseous   abnormalities are seen. IMPRESSION: No intracranial abnormality. Severe pan sinus mucosal disease. Signing/Reading Doctor: Roro Huff ([id number])    Chance Hsu ([id number])  Feb 16 2016  3:54PM                                    MRI Results (recent):  No results found for this or any previous visit.    _____________________________________________________________________    Review of Systems: 10 point ROS was performed. Pertinent positives listed in HPI. Denies: balance difficulties, angina, palpitations, paresthesias, vision loss, slurred speech, aphasia, confusion, fever, chills, falls, diplopia, back pain, neck pain, prior episodes of vertigo, hallucinations, new medications or dosage changes. _____________________________________________________________________  Impression  25 y.o. female with onset of worst migraine of her life yesterday AM while inpatient, after discharge home around 8pm she had difficulty focusing/concentrating on tasks, HA pain intermittent during day and questionable syncopal event. Exam non-focal  .  Imaging reviewed:  CT head without acute findings. Labs : overall stable    With description of patient and knowing she had worst HA of life with vomiting x3, had pre-eclampsia before delivery, delivered first child on Friday and said she passed out but heard her family talking for a few minutes after going home last night---a very stressful time in her life may have caused a syncopal event. Refer to plan below. Assessment:  1. Syncope vs. Seizure   2. Pre-eclampsia   3. Severe migraine-- all over head  4. Migraine HX-- every other day    Plan  · Agree with EEG--will await results  · BP remains elevated more than it has been controlled since admission--- recommend mgmt improve before discharge  · Will schedule OP neurology appt if patient desires HA evaluation in future  ·   Continue great care by collaborating care team and nursing staff.   ·  Testing discussed with patient --- any questions answered. Patient may be discharged: Once EEG resulted if it is normal    My collaborating care team physician may have further recommendations. On DVT Prophylaxis yes no   Continue SCDs while inpatient x      Care Plan discussed with:  Patient x   Family    RN- Bel hare   Care Manager    Consultant/Specialist:     Patient will be discussed with Dr. Pierre Jain  ______________________________________________________________  Hospital Problems  Date Reviewed: 9/21/2017          Codes Class Noted POA    Hypertension in pregnancy, preeclampsia, severe, postpartum condition ICD-10-CM: O14.15  ICD-9-CM: 642.54  9/25/2017 Unknown              *Thank you for allowing [facility], to participate in the care of your patient.     ---EZIO Julian  ================================================    ADDENDUM--> Collaborating Care Team Physician:

## 2017-09-26 NOTE — LACTATION NOTE
Mother has been formula feeding, was readmitted today, woke up from a nap with breast leaking and has decided she will pump and offer expressed breastmilk to baby. Pt will pump per guidelines to establish adequate breast milk supply. Pumping:  Guidelines for pumping, milk collection and storage, proper cleaning of pump parts all reviewed. How to establish and maintain breast milk supply through pumping reviewed. Differences between hospital grade rental pumps vs store bought double electric/hand pumps discussed. Set up pumping with double electric set up. Assisted with pump session. List of area pump rental locations and lactation support services provided. Mother pumping with LC at bedside, mother able to pump 40ml, LC brought labeled EBM to breastmilk freezer.

## 2017-09-26 NOTE — PROGRESS NOTES
0700 - Assumed care of patient at this time. Pt sleeping. 275mL emptied from miranda, BP's have been stable, will monitor qhour. 12 - Patient awake with RN in room and FOB at bedside. Patient denies any HA, reports feeling \"better\". Denies any needs at this time. FOB leaving to take infant to pediatric appointment and going to work. Will leave phone number on board. 3520 - Patient sitting up in bed walking on phone with mother. Describes seizure episode as \"forgetting how to change infant's diaper at home and forgetting what toilet paper was. blacking out and waking up in ambulance\". Patient reports having an aura by \"knowing when it was about to happen\". Patient denies HA or visual disturbances at this time. [address] [facility] with Dr. Nikky Mayes. Updated on patient condition. TORB to continue patient on 100mg labetalol PO BID including NOW and to place a neurology consult r/t seizure activity >48 hours post-delivery on a pre-e patient. [facility] called for consult [phone]. On call MD will return call for consult. [address] from Dr. Nikky Mayes for regular diet and DC miranda when patient feels stable enough to use bedside commode and if BP stays stable = 130-140/70-80. 1019 - Patient in bed watching tv and visitor present at bedside. No needs at this time. 36 - Spoke with neurology regarding consult, updated on patient condition and reason for readmission, will round on patient today. 1126 - TORB to DC magnesium sulfate administration while patient goes for CT and restart when patient returns to unit. 1133 - miranda removed per pt request and MD order. Patient gown changed and linen changed. Magnesium Sulfate and LR DC for transport to CT, will continue when patient returns. 1143 - Patient taken to CT by transport via wheelchair. 1157 - Patient returned to room from CT. Magnesium sulfate and LR restarted.  Bedside commode and supplies set up next to patient bed, instructed patient to call out after voiding for RN to measured. 1216 - in bed watching tv.     1356 - Patient sleeping    [address] from Dr. Ayesha Jean for [facility] labs (cbc, cmp, ld and uric acid) to go along with the TSH that neurology ordered. 1445 - Patient awake from nap, right breast leaking milk, would like to pump. Supplies and pump set up for patient and will begin pumping now. 1506 - Patient taken for EEG via wheelchair. 1600 - Patient returned from EEG. Ambulating to bathroom before returning to bed. Patient voiding, no hat in toilet. Will continue to pump. 1640 - Lactation at bedside assisting with pumping. 1912 - Bedside and Verbal shift change report given to Silvina Gan RN (oncoming nurse) by Vanessa Ann RN (offgoing nurse). Report included the following information SBAR, Intake/Output, MAR, Recent Results and Med Rec Status.

## 2017-09-27 VITALS
HEIGHT: 63 IN | WEIGHT: 262 LBS | HEART RATE: 96 BPM | OXYGEN SATURATION: 100 % | BODY MASS INDEX: 46.42 KG/M2 | SYSTOLIC BLOOD PRESSURE: 146 MMHG | RESPIRATION RATE: 14 BRPM | TEMPERATURE: 98.5 F | DIASTOLIC BLOOD PRESSURE: 79 MMHG

## 2017-09-27 PROCEDURE — 99285 EMERGENCY DEPT VISIT HI MDM: CPT | Performed by: OBSTETRICS & GYNECOLOGY

## 2017-09-27 PROCEDURE — 74011250637 HC RX REV CODE- 250/637: Performed by: OBSTETRICS & GYNECOLOGY

## 2017-09-27 PROCEDURE — 96365 THER/PROPH/DIAG IV INF INIT: CPT | Performed by: OBSTETRICS & GYNECOLOGY

## 2017-09-27 PROCEDURE — 96375 TX/PRO/DX INJ NEW DRUG ADDON: CPT | Performed by: OBSTETRICS & GYNECOLOGY

## 2017-09-27 PROCEDURE — 96374 THER/PROPH/DIAG INJ IV PUSH: CPT | Performed by: OBSTETRICS & GYNECOLOGY

## 2017-09-27 RX ORDER — NIFEDIPINE 60 MG/1
60 TABLET, EXTENDED RELEASE ORAL DAILY
Qty: 30 TAB | Refills: 11 | Status: SHIPPED | OUTPATIENT
Start: 2017-09-27 | End: 2020-07-31

## 2017-09-27 RX ORDER — LABETALOL 100 MG/1
100 TABLET, FILM COATED ORAL EVERY 12 HOURS
Status: DISCONTINUED | OUTPATIENT
Start: 2017-09-27 | End: 2017-09-27

## 2017-09-27 RX ORDER — NIFEDIPINE 30 MG/1
60 TABLET, EXTENDED RELEASE ORAL DAILY
Status: DISCONTINUED | OUTPATIENT
Start: 2017-09-27 | End: 2017-09-27 | Stop reason: HOSPADM

## 2017-09-27 RX ORDER — LABETALOL 200 MG/1
200 TABLET, FILM COATED ORAL EVERY 12 HOURS
Status: DISCONTINUED | OUTPATIENT
Start: 2017-09-27 | End: 2017-09-27 | Stop reason: HOSPADM

## 2017-09-27 RX ORDER — LABETALOL 200 MG/1
200 TABLET, FILM COATED ORAL EVERY 12 HOURS
Status: DISCONTINUED | OUTPATIENT
Start: 2017-09-27 | End: 2017-09-27

## 2017-09-27 RX ORDER — NIFEDIPINE 10 MG/1
10 CAPSULE ORAL
Status: COMPLETED | OUTPATIENT
Start: 2017-09-27 | End: 2017-09-27

## 2017-09-27 RX ORDER — NIFEDIPINE 60 MG/1
60 TABLET, EXTENDED RELEASE ORAL DAILY
Qty: 30 TAB | Refills: 11 | Status: SHIPPED | OUTPATIENT
Start: 2017-09-27 | End: 2017-09-27 | Stop reason: SDUPTHER

## 2017-09-27 RX ADMIN — LABETALOL HYDROCHLORIDE 200 MG: 200 TABLET, FILM COATED ORAL at 09:24

## 2017-09-27 RX ADMIN — NIFEDIPINE 60 MG: 30 TABLET, FILM COATED, EXTENDED RELEASE ORAL at 15:12

## 2017-09-27 RX ADMIN — NIFEDIPINE 10 MG: 10 CAPSULE ORAL at 14:47

## 2017-09-27 RX ADMIN — Medication 10 ML: at 09:24

## 2017-09-27 NOTE — PROGRESS NOTES
0730 - pt resting in bed    0830 - pt resting on couch, reports bed is not comfortable. 0900 - MD at bedside    0915 - PIV infiltrated, removed. VORB from Dr. Mejia Laureano to modify labetalol dosage to 200mg PO q12h.    1020 - Patient desires to shower. /78. Can shower, will reassess BP after shower. 1153 - Pt on couch watching tv, no needs at this time. BP taken 149/84    1430 - TORB from Dr. Mejia Laureano for 10mg immediate release nifedapine capsule and 60mg ER nifedipine daily to include now and DC labetalol 200mg q12h    1440 - Patient called out concerned with higher blood pressure reading. Visibly distressed. 1500 - Patient visually distressed. States \"I am stressed out and depressed and having severe separation anxiety. I want to go home. I want to be with my baby and check in with Dr. Mejia Laureano from home. I'll take my medicine and I'll do everything yall tell me to. I just want to go home. I need to talk to Dr. Mejia Laureano. \" Patient desires to leave AMA, Dr. Mejia Laureano paged for bedside consult. 1620 - I have reviewed discharge instructions with the patient including follow-up appointments and prescriptions, postpartum preeclampsia care and post seizure care. The patient verbalized understanding and was given opportunity for questions, no questions at this time. Patient will leave then fiance arrives on unit, does not need help with belongings.

## 2017-09-27 NOTE — PROGRESS NOTES
09/27/17 3:14 PM  CM met with patient today to begin discharge planning. Patient delivered her baby boy Bonnie Julien at [facility] a week ago and returned the day after discharge for a seizure. Patient lives with her boyfriend/FOB Karissa Klein ([phone]) and their baby. Patient's mother Chris Messina ([phone]) lives close and is assisting FOB with the baby now that patient is hospitalized. FOB returns to work this week, as he's had a few days off. Their home is one level. Patient is able to drive herself and complete ADLs and self care tasks on her own. She has rx coverage and uses [facility] on [address]. She has never seen her PCP, but he is Dr. Adal Avina. Patient's boyfriend or her mother will provide transportation home at discharge. She has a [address] appointment scheduled for 9/28 at 2:30 PM; she understands that she should call to reschedule if she is not discharged in time to make the appointment. She also has an appointment scheduled with the [facility] on 10/18; this has been added to this AVS.  Care Management Interventions  PCP Verified by CM: Yes ([facility] Dillon)  Transition of Care Consult (CM Consult): Discharge Planning  Current Support Network:  Other (Lives with boyfriend/FOB)  Confirm Follow Up Transport: Family (Boyfriend or mother)  Plan discussed with Pt/Family/Caregiver: Yes  Discharge Location  Discharge Placement: Home with outpatient services  Achilles CARO Elias

## 2017-09-27 NOTE — PROGRESS NOTES
2143  SBAR report from Felix Mercado RN. Assumed care of the patient at this time. Discussed plan of care for the evening with the patient, she verbalized understanding. Will continue to monitor.

## 2017-09-27 NOTE — PROGRESS NOTES
Called to room pt reports feeling like having an allergic reaction  2038- Dr. Sims Credit notified of pt's red rash & itching on legs, MAR reviewed, ordered to stop magnesium at this time, will give benadryl and continue to monitor

## 2017-09-27 NOTE — DISCHARGE INSTRUCTIONS
Learning About Preeclampsia After Childbirth  What is preeclampsia? A woman with preeclampsia has blood pressure that is higher than usual. She may also have other serious symptoms. Preeclampsia can be dangerous. When it is severe, it can cause seizures (eclampsia) or liver or kidney damage. When the liver is affected, some women get HELLP syndrome, a blood-clotting and bleeding problem. HELLP can come on quickly and can be deadly. This is why your doctor checks you and your baby often. Preeclampsia usually occurs after 20 weeks of pregnancy. In rare cases, it is first noted right after childbirth. Most often, it starts near the end of pregnancy and goes away after childbirth. What are the symptoms? Mild preeclampsia usually doesn't cause symptoms. But preeclampsia can cause rapid weight gain and sudden swelling of the hands and face. Severe preeclampsia does cause symptoms. It can cause a very bad headache and trouble seeing and breathing. It also can cause belly pain. You may also urinate less than usual.  If you have new preeclampsia symptoms after you go home from the hospital, call your doctor right away. What can you expect after you have had preeclampsia? In the hospital  After the baby and the placenta are delivered, preeclampsia usually starts to improve. Most women get better in the first few days after childbirth. After having preeclampsia, you still have a risk of seizures for a day or more after childbirth. (Very rarely, seizures happen later on.) So your doctor may have you take magnesium sulfate for a day or more to prevent seizures. You may also take medicine to lower your blood pressure. When you go home  Your blood pressure will most likely return to normal a few days after delivery. Your doctor will want to check your blood pressure sometime in the first week after you leave the hospital.  Some women still have high blood pressure 6 weeks after childbirth.  But most return to normal levels over the long term. · Take and record your blood pressure at home if your doctor tells you to. ¨ Learn the importance of the two measures of blood pressure (such as 120 over 80, or 120/80). The first number is the systolic pressure. This is the force of blood on the artery walls as the heart pumps. The second number is the diastolic pressure. This is the force of blood on the artery walls between heartbeats, when the heart is at rest. You have a choice of monitors to use. § Manual monitor: You pump up the cuff and use a stethoscope to listen for your pulse. § Electronic monitor: The cuff inflates, and a gauge shows your pulse rate. ¨ To take your blood pressure:  § Ask your doctor to check your blood pressure monitor to be sure that it is accurate and that the cuff fits you. Also ask your doctor to watch you use it, to make sure that you are using it right. § You should not eat, use tobacco products, or use medicine known to raise blood pressure (such as some nasal decongestant sprays) before you take your blood pressure. § Avoid taking your blood pressure if you have just exercised or are nervous or upset. Rest at least 15 minutes before you take your blood pressure. · Be safe with medicines. If you take medicine, take it exactly as prescribed. Call your doctor if you think you are having a problem with your medicine. · Do not smoke. Quitting smoking will help lower your blood pressure and improve your baby's growth and health. If you need help quitting, talk to your doctor about stop-smoking programs and medicines. These can increase your chances of quitting for good. · Eat a balanced and healthy diet that has lots of fruits and vegetables. Long-term health  After you have had preeclampsia, you have a higher-than-average risk of heart disease, stroke, and kidney disease. This may be because the same things that cause preeclampsia also cause heart and kidney disease.   To protect your health, work with your doctor on living a heart-healthy lifestyle and getting the checkups you need. Your doctor may also want you to check your blood pressure at home. Follow-up care is a key part of your treatment and safety. Be sure to make and go to all appointments, and call your doctor if you are having problems. It's also a good idea to know your test results and keep a list of the medicines you take. Where can you learn more? Go to http://kirby-annalee.info/. Enter X587 in the search box to learn more about \"Learning About Preeclampsia After Childbirth. \"  Current as of: March 16, 2017  Content Version: 11.3  © 8638-2265 [employer] Care instructions adapted under license by [employer] (which disclaims liability or warranty for this information). If you have questions about a medical condition or this instruction, always ask your healthcare professional. [place] [address] any warranty or liability for your use of this information. Seizure: Care Instructions  Your Care Instructions    Seizures are caused by abnormal patterns of electrical signals in the brain. They are different for each person. Seizures can affect movement, speech, vision, or awareness. Some people have only slight shaking of a hand and do not pass out. Other people may pass out and have violent shaking of the whole body. Some people appear to stare into space. They are awake, but they can't respond normally. Later, they may not remember what happened. You may need tests to identify the type and cause of the seizures. A seizure may occur only once, or you may have them more than one time. Taking medicines as directed and following up with your doctor may help keep you from having more seizures. The doctor has checked you carefully, but problems can develop later. If you notice any problems or new symptoms, get medical treatment right away.   Follow-up care is a key part of your treatment and safety. Be sure to make and go to all appointments, and call your doctor if you are having problems. It's also a good idea to know your test results and keep a list of the medicines you take. How can you care for yourself at home? · Be safe with medicines. Take your medicines exactly as prescribed. Call your doctor if you think you are having a problem with your medicine. · Do not do any activity that could be dangerous to you or others until your doctor says it is safe to do so. For example, do not drive a car, operate machinery, swim, or climb ladders. · Be sure that anyone treating you for any health problem knows that you have had a seizure and what medicines you are taking for it. · Identify and avoid things that may make you more likely to have a seizure. These may include lack of sleep, alcohol or drug use, stress, or not eating. · Make sure you go to your follow-up appointment. When should you call for help? Call 911 anytime you think you may need emergency care. For example, call if:  · You have another seizure. · You have more than one seizure in 24 hours. · You have new symptoms, such as trouble walking, speaking, or thinking clearly. Call your doctor now or seek immediate medical care if:  · You are not acting normally. Watch closely for changes in your health, and be sure to contact your doctor if you have any problems. Where can you learn more? Go to http://kirby-annalee.info/. Enter N355 in the search box to learn more about \"Seizure: Care Instructions. \"  Current as of: October 14, 2016  Content Version: 11.3  © 0113-2119 [employer]. Care instructions adapted under license by [employer] (which disclaims liability or warranty for this information).  If you have questions about a medical condition or this instruction, always ask your healthcare professional. [address] any warranty or liability for your use of this information.

## 2017-09-27 NOTE — PROGRESS NOTES
Progress Note    Bryanna De La O     Patient doing well without significant complaint. Reports overall feeling much better. Vitals:  Visit Vitals    BP (!) 166/92    Pulse 86    Temp 98.4 °F (36.9 °C)    Resp 14    Ht 5' 3\" (1.6 m)    Wt 262 lb (118.8 kg)    SpO2 98%    Breastfeeding Yes    BMI 46.41 kg/m2     Temp (24hrs), Av.3 °F (36.8 °C), Min:98.1 °F (36.7 °C), Max:98.4 °F (36.9 °C)    Exam:   Patient without distress. Abdomen soft, fundus firm, nontender                Perineum with normal lochia noted. Lower extremities are negative for swelling, cords or tenderness.     Labs:     Lab Results   Component Value Date/Time    WBC 8.3 2017 02:33 PM    WBC 9.8 2017 01:16 PM    WBC 6.3 2017 04:17 PM    WBC 7.0 2017 02:54 PM    HGB 11.3 2017 02:33 PM    HGB 12.7 2017 01:16 PM    HGB 12.6 2017 04:17 PM    HGB 12.3 2017 02:54 PM    HCT 33.9 2017 02:33 PM    HCT 38.1 2017 01:16 PM    HCT 36.8 2017 04:17 PM    HCT 36.9 2017 02:54 PM    PLATELET 749 10/82/2176 02:33 PM    PLATELET 831  01:16 PM    PLATELET 377  04:17 PM    PLATELET 628  02:54 PM    Hgb, External 12.3 2017    Hct, External 36.9 2017    Platelet cnt., External 279 2017       Recent Results (from the past 24 hour(s))   TSH 3RD GENERATION    Collection Time: 17  2:33 PM   Result Value Ref Range    TSH 1.32 0.36 - 3.74 uIU/mL   CBC WITH AUTOMATED DIFF    Collection Time: 17  2:33 PM   Result Value Ref Range    WBC 8.3 3.6 - 11.0 K/uL    RBC 3.71 (L) 3.80 - 5.20 M/uL    HGB 11.3 (L) 11.5 - 16.0 g/dL    HCT 33.9 (L) 35.0 - 47.0 %    MCV 91.4 80.0 - 99.0 FL    MCH 30.5 26.0 - 34.0 PG    MCHC 33.3 30.0 - 36.5 g/dL    RDW 14.5 11.5 - 14.5 %    PLATELET 330 700 - 967 K/uL    NEUTROPHILS 69 32 - 75 %    LYMPHOCYTES 21 12 - 49 %    MONOCYTES 7 5 - 13 %    EOSINOPHILS 3 0 - 7 %    BASOPHILS 0 0 - 1 %    ABS. NEUTROPHILS 5.7 1.8 - 8.0 K/UL    ABS. LYMPHOCYTES 1.8 0.8 - 3.5 K/UL    ABS. MONOCYTES 0.6 0.0 - 1.0 K/UL    ABS. EOSINOPHILS 0.3 0.0 - 0.4 K/UL    ABS. BASOPHILS 0.0 0.0 - 0.1 K/UL   METABOLIC PANEL, COMPREHENSIVE    Collection Time: 09/26/17  2:33 PM   Result Value Ref Range    Sodium 141 136 - 145 mmol/L    Potassium 3.7 3.5 - 5.1 mmol/L    Chloride 107 97 - 108 mmol/L    CO2 25 21 - 32 mmol/L    Anion gap 9 5 - 15 mmol/L    Glucose 119 (H) 65 - 100 mg/dL    BUN 7 6 - 20 MG/DL    Creatinine 0.99 0.55 - 1.02 MG/DL    BUN/Creatinine ratio 7 (L) 12 - 20      GFR est AA >60 >60 ml/min/1.73m2    GFR est non-AA >60 >60 ml/min/1.73m2    Calcium 7.9 (L) 8.5 - 10.1 MG/DL    Bilirubin, total 0.3 0.2 - 1.0 MG/DL    ALT (SGPT) 125 (H) 12 - 78 U/L    AST (SGOT) 146 (H) 15 - 37 U/L    Alk. phosphatase 92 45 - 117 U/L    Protein, total 6.0 (L) 6.4 - 8.2 g/dL    Albumin 2.6 (L) 3.5 - 5.0 g/dL    Globulin 3.4 2.0 - 4.0 g/dL    A-G Ratio 0.8 (L) 1.1 - 2.2     LD    Collection Time: 09/26/17  2:33 PM   Result Value Ref Range     (H) 81 - 246 U/L   URIC ACID    Collection Time: 09/26/17  2:33 PM   Result Value Ref Range    Uric acid 11.6 (H) 2.6 - 6.0 MG/DL       Assessment: PPD #5 s/p eclamptic seizure. BP still elevated on labetalol 100 mg bid will increase to 200 mg bid     Plan: Will increase labetalol to 200 mg bid, if discharge to home once BP's are consistently well controlled.

## 2017-09-27 NOTE — PROGRESS NOTES
Progress Note  Pt with severe range BP's switched to procardia, labetalol stopped. Given 1 dose of IV n10 mg nifedipine. Most recent /88. Pt reports that she needs to go home to be with her baby. She reports that she cannot stay in the hospital and feels that she will do better at home. She reports that she overall feels \"great\" and reports that she will listen to her body and if she begins to have a headache in the future she will go straight to the ER. She reports that she will sign out AMA if necessary. Discussed risks of elevated BP with pt- including seizure, stroke, death, brain damage, paralysis, heart disease, etc.  She reports she understands the risks and will come in the office for a BP check tomorrow morning. Will discharge to home on Procardia 60 mg daily. STRICT PIH precautions discussed. Will f/u in the office in the morning.

## 2017-10-02 ENCOUNTER — TELEPHONE (OUTPATIENT)
Dept: OBGYN CLINIC | Age: 22
End: 2017-10-02

## 2017-10-02 NOTE — TELEPHONE ENCOUNTER
Call received at 10:06am    [facility] calling on behalf of her daughter. This nurse advised that the daughter needs to call to make the appointment due to her name not being on the HIPPA form for me to speak to and that her daughter needed to call the office to make the appointment herself. Ms Rony Chao verbalized understanding.

## 2017-10-02 NOTE — TELEPHONE ENCOUNTER
Patient called back to office and stated she was unable to go to the ER due to her mother \"being difficult\". She has no one to help with her child. Advised patient that if her headache is severe and blurry vision (vision changes) occur she should call 911 and have a rescue squad come pick her up. Patient verbalized understanding. Patient is aware of the risks of not being seen for severe headache and vision changes.

## 2017-10-02 NOTE — TELEPHONE ENCOUNTER
Called pt as she did not come in for BP check last week-- pt said she is not feeling well-- stated she had a headache yesterday, \" kind of \" getting a headache now, she is nausea, has swelling, and c/o blurred vision. I advised pt per Dr. Jenn Stauffer to report to the ER immediately or call 911. Pt verbalized understanding. She stated shr would come to the ER.

## 2017-10-02 NOTE — DISCHARGE SUMMARY
Obstetrical Discharge Summary     Name: Vera Sevilla MRN: [id number]  SSN: [id number]    YOB: 1995  Age: 25 y.o. Sex: female      Admit Date: 9/25/2017    Discharge Date: 10/2/2017     Admitting Physician: Jacob Del Angel MD     Attending Physician:  No att. providers found     * Admission Diagnoses: Hypertension in pregnancy, preeclampsia, severe, postpartum*    * Discharge Diagnoses:   WILLIAM     Additional Diagnoses:   Hospital Problems as of 9/27/2017  Date Reviewed: 9/26/2017          Codes Class Noted - Resolved POA    History of migraine ICD-10-CM: Z86.69  ICD-9-CM: V12.49  9/26/2017 - Present Yes        * (Principal)Syncope ICD-10-CM: R55  ICD-9-CM: 780.2  9/26/2017 - Present Unknown        Convulsions ([facility] [address] ICD-10-CM: R56.9  ICD-9-CM: 780.39  9/26/2017 - Present Unknown        Hypertension in pregnancy, preeclampsia, severe, postpartum condition ICD-10-CM: O14.15  ICD-9-CM: 642.54  9/25/2017 - Present Unknown             Lab Results   Component Value Date/Time    Rubella, External IMMUNE 04/06/2017    GrBStrep, External negative 09/07/2017    ABO,Rh O POSITIVE 04/06/2017    There is no immunization history for the selected administration types on file for this patient. * Procedures: none       * Discharge Condition: stable    * Hospital Course: Pt was admitted PPD #4 having had 2 seizures at home. She had severe range BP's and was treated with labetalol upon arrival.  She received 24 hour of Magnesium. Her labetalol was increased to 200 mg bid. She continued to have elevated BP and procardia xl was started 60 mg daily. Pt requested d/c on date of discharge despite recommendations to stay until BP's were improved. She reported that she would sign out AMA if needed. Strict precautions were discussed with the pt as well as risks of continued elevated BP and potential causes of maternal morbidity and/or mortality.   Pt reported that she would rto for BP check the day after discharge. * Disposition: home with office follow-up    Discharge Medications:   Discharge Medication List as of 9/27/2017  3:51 PM          * Follow-up Care/Patient Instructions: Activity: activity as tolerated  Diet: general  Wound Care: as directed    Follow-up Information     Follow up With Details Comments Contact Info    Nathalie Murillo NP Go on 10/18/2017 Neurology appt. for migraine HA mgmt, with nurse practitioner:  Arrive: 1100, Appt at: [address] [facility]  [address] [facility]      Bill Wells MD In 1 day 10:30 [address]  [phone]             Signed By:  Bill Wells MD     October 2, 2017

## 2017-10-25 ENCOUNTER — OFFICE VISIT (OUTPATIENT)
Dept: NEUROLOGY | Age: 22
End: 2017-10-25

## 2017-10-25 VITALS
RESPIRATION RATE: 20 BRPM | WEIGHT: 226 LBS | DIASTOLIC BLOOD PRESSURE: 74 MMHG | BODY MASS INDEX: 40.04 KG/M2 | HEIGHT: 63 IN | SYSTOLIC BLOOD PRESSURE: 122 MMHG

## 2017-10-25 DIAGNOSIS — G43.909 MIGRAINE WITHOUT STATUS MIGRAINOSUS, NOT INTRACTABLE, UNSPECIFIED MIGRAINE TYPE: Primary | ICD-10-CM

## 2017-10-25 RX ORDER — TOPIRAMATE 25 MG/1
TABLET ORAL
Qty: 42 TAB | Refills: 0 | Status: SHIPPED | OUTPATIENT
Start: 2017-10-25 | End: 2020-07-31

## 2017-10-25 RX ORDER — SUMATRIPTAN 100 MG/1
TABLET, FILM COATED ORAL
Qty: 12 TAB | Refills: 5 | Status: SHIPPED | OUTPATIENT
Start: 2017-10-25 | End: 2020-07-31

## 2017-10-25 RX ORDER — TOPIRAMATE 100 MG/1
TABLET, FILM COATED ORAL
Qty: 30 TAB | Refills: 5 | Status: SHIPPED | OUTPATIENT
Start: 2017-10-25 | End: 2020-07-31

## 2017-10-25 NOTE — PATIENT INSTRUCTIONS
Learning About Living Ishan  What is a living will? A living will is a legal form you use to write down the kind of care you want at the end of your life. It is used by the health professionals who will treat you if you aren't able to decide for yourself. If you put your wishes in writing, your loved ones and others will know what kind of care you want. They won't need to guess. This can ease your mind and be helpful to others. A living will is not the same as an estate or property will. An estate will explains what you want to happen with your money and property after you die. Is a living will a legal document? A living will is a legal document. Each state has its own laws about living amanda. If you move to another state, make sure that your living will is legal in the state where you now live. Or you might use a universal form that has been approved by many states. This kind of form can sometimes be completed and stored online. Your electronic copy will then be available wherever you have a connection to the Internet. In most cases, doctors will respect your wishes even if you have a form from a different state. · You don't need an  to complete a living will. But legal advice can be helpful if your state's laws are unclear, your health history is complicated, or your family can't agree on what should be in your living will. · You can change your living will at any time. Some people find that their wishes about end-of-life care change as their health changes. · In addition to making a living will, think about completing a medical power of  form. This form lets you name the person you want to make end-of-life treatment decisions for you (your \"health care agent\") if you're not able to. Many hospitals and nursing homes will give you the forms you need to complete a living will and a medical power of .   · Your living will is used only if you can't make or communicate decisions for yourself anymore. If you become able to make decisions again, you can accept or refuse any treatment, no matter what you wrote in your living will. · Your state may offer an online registry. This is a place where you can store your living will online so the doctors and nurses who need to treat you can find it right away. What should you think about when creating a living will? Talk about your end-of-life wishes with your family members and your doctor. Let them know what you want. That way the people making decisions for you won't be surprised by your choices. Think about these questions as you make your living will:  · Do you know enough about life support methods that might be used? If not, talk to your doctor so you know what might be done if you can't breathe on your own, your heart stops, or you're unable to swallow. · What things would you still want to be able to do after you receive life-support methods? Would you want to be able to walk? To speak? To eat on your own? To live without the help of machines? · If you have a choice, where do you want to be cared for? In your home? At a hospital or nursing home? · Do you want certain Mandaen practices performed if you become very ill? · If you have a choice at the end of your life, where would you prefer to die? At home? In a hospital or nursing home? Somewhere else? · Would you prefer to be buried or cremated? · Do you want your organs to be donated after you die? What should you do with your living will? · Make sure that your family members and your health care agent have copies of your living will. · Give your doctor a copy of your living will to keep in your medical record. If you have more than one doctor, make sure that each one has a copy. · You may want to put a copy of your living will where it can be easily found. Where can you learn more? Go to http://kirby-annalee.info/.   Enter F567 in the search box to learn more about \"Learning About Living Shalonda Brunner. \"  Current as of: August 8, 2016  Content Version: 11.3  © 7304-3182 [employer]. Care instructions adapted under license by [employer] (which disclaims liability or warranty for this information). If you have questions about a medical condition or this instruction, always ask your healthcare professional. [address] any warranty or liability for your use of this information. Advance Directives: Care Instructions  Your Care Instructions  An advance directive is a legal way to state your wishes at the end of your life. It tells your family and your doctor what to do if you can no longer say what you want. There are two main types of advance directives. You can change them any time that your wishes change. · A living will tells your family and your doctor your wishes about life support and other treatment. · A durable power of  for health care lets you name a person to make treatment decisions for you when you can't speak for yourself. This person is called a health care agent. If you do not have an advance directive, decisions about your medical care may be made by a doctor or a  who doesn't know you. It may help to think of an advance directive as a gift to the people who care for you. If you have one, they won't have to make tough decisions by themselves. Follow-up care is a key part of your treatment and safety. Be sure to make and go to all appointments, and call your doctor if you are having problems. It's also a good idea to know your test results and keep a list of the medicines you take. How can you care for yourself at home? · Discuss your wishes with your loved ones and your doctor. This way, there are no surprises. · Many states have a unique form. Or you might use a universal form that has been approved by many states. This kind of form can sometimes be completed and stored online.  Your electronic copy will then be available wherever you have a connection to the Internet. In most cases, doctors will respect your wishes even if you have a form from a different state. · You don't need a  to do an advance directive. But you may want to get legal advice. · Think about these questions when you prepare an advance directive:  ¨ Who do you want to make decisions about your medical care if you are not able to? Many people choose a family member or close friend. ¨ Do you know enough about life support methods that might be used? If not, talk to your doctor so you understand. ¨ What are you most afraid of that might happen? You might be afraid of having pain, losing your independence, or being kept alive by machines. ¨ Where would you prefer to die? Choices include your home, a hospital, or a nursing home. ¨ Would you like to have information about hospice care to support you and your family? ¨ Do you want to donate organs when you die? ¨ Do you want certain Gnosticist practices performed before you die? If so, put your wishes in the advance directive. · Read your advance directive every year, and make changes as needed. When should you call for help? Be sure to contact your doctor if you have any questions. Where can you learn more? Go to http://kirby-annalee.info/. Enter R264 in the search box to learn more about \"Advance Directives: Care Instructions. \"  Current as of: November 17, 2016  Content Version: 11.3  © 1333-1018 [employer]. Care instructions adapted under license by [employer] (which disclaims liability or warranty for this information). If you have questions about a medical condition or this instruction, always ask your healthcare professional. [place] [address] any warranty or liability for your use of this information.   [facility]   Statement to Patients  April 1, 2014      In an effort to ensure the large volume of patient prescription refills is processed in the most efficient and expeditious manner, we are asking our patients to assist us by calling your Pharmacy for all prescription refills, this will include also your  Mail Order Pharmacy. The pharmacy will contact our office electronically to continue the refill process. Please do not wait until the last minute to call your pharmacy. We need at least 48 hours (2days) to fill prescriptions. We also encourage you to call your pharmacy before going to  your prescription to make sure it is ready. With regard to controlled substance prescription refill requests (narcotic refills) that need to be picked up at our office, we ask your cooperation by providing us with at least 72 hours (3days) notice that you will need a refill. We will not refill narcotic prescription refill requests after 4:00pm on any weekday, Monday through Thursday, or after 2:00pm on Fridays, or on the weekends. We encourage everyone to explore another way of getting your prescription refill request processed using [employer], our patient web portal through our electronic medical record system. [employer] is an efficient and effective way to communicate your medication request directly to the office and  downloadable as an sanaz on your smart phone . [employer] also features a review functionality that allows you to view your medication list as well as leave messages for your physician. Are you ready to get connected? If so please review the attatched instructions or speak to any of our staff to get you set up right away! Thank you so much for your cooperation. Should you have any questions please contact our Practice Administrator.     The Physicians and Staff,  [facility]

## 2017-10-25 NOTE — PROGRESS NOTES
Reviewed record in preparation for visit and have necessary documentation  Pt did not bring medication to office visit for review  Medication list reviewed and reconciled with patient  Information was given to pt on Advanced Directives, Living Will  opportunity was given for questions  Tried and failed topamax

## 2017-10-25 NOTE — MR AVS SNAPSHOT
Visit Information Date & Time Provider Department Dept. Phone Encounter #  
 10/25/2017  9:30 AM Kiesha Arora NP [facility] Neurology [facility] [phone] [id number] Follow-up Instructions Return in about 2 months (around 12/25/2017). Upcoming Health Maintenance Date Due  
 HPV AGE 9Y-34Y (1 of 3 - Female 3 Dose Series) 5/15/2007 DTaP/Tdap/Td series (1 - Tdap) 5/15/2017 PAP AKA CERVICAL CYTOLOGY 5/15/2017 INFLUENZA AGE 9 TO ADULT 8/1/2017 Allergies as of 10/25/2017  Review Complete On: 10/25/2017 By: Khang Ramos LPN Severity Noted Reaction Type Reactions Aspirin Medium 09/25/2017    Hoarseness Ibuprofen  03/04/2015    Itching Tylenol [Acetaminophen]  08/07/2017    Unknown (comments) Stuffy nose and scratchy throat Current Immunizations  Reviewed on 9/26/2017 No immunizations on file. Not reviewed this visit You Were Diagnosed With   
  
 Codes Comments Migraine without status migrainosus, not intractable, unspecified migraine type    -  Primary ICD-10-CM: E43.748 ICD-9-CM: 346.90 Vitals BP Resp Height(growth percentile) Weight(growth percentile) BMI OB Status 122/74 20 5' 3\" (1.6 m) 226 lb (102.5 kg) 40.03 kg/m2 Recent pregnancy Smoking Status Never Smoker Vitals History BMI and BSA Data Body Mass Index Body Surface Area 40.03 kg/m 2 2.13 m 2 Preferred Pharmacy Pharmacy Name Phone [facility] [address], [address] [place] [address] AT [address] [place] [address] Road [phone] Your Updated Medication List  
  
   
This list is accurate as of: 10/25/17 10:29 AM.  Always use your most recent med list.  
  
  
  
  
 NIFEdipine ER 60 mg ER tablet Commonly known as:  PROCARDIA XL Take 1 Tab by mouth daily. SUMAtriptan 100 mg tablet Commonly known as:  IMITREX 1 at HA onset and repeat in 2 hours if needed. Max 2 in 24 hours * topiramate 25 mg tablet Commonly known as:  TOPAMAX 25 mg nightly for 1 week then, 50 mg nightly for 1 week then, 75 mg nightly for 1 week then, 100mg nightly thereafter. * topiramate 100 mg tablet Commonly known as:  TOPAMAX Take 1 tablet by mouth nightly * Notice: This list has 2 medication(s) that are the same as other medications prescribed for you. Read the directions carefully, and ask your doctor or other care provider to review them with you. Prescriptions Sent to Pharmacy Refills  
 topiramate (TOPAMAX) 25 mg tablet 0 Si mg nightly for 1 week then, 50 mg nightly for 1 week then, 75 mg nightly for 1 week then, 100mg nightly thereafter. Class: Normal  
 Pharmacy: [facility] [address] [place] [address] Ph #: [phone]  
 topiramate (TOPAMAX) 100 mg tablet 5 Sig: Take 1 tablet by mouth nightly Class: Normal  
 Pharmacy: [facility] [address] [place] [address] Ph #: [phone] SUMAtriptan (IMITREX) 100 mg tablet 5 Si at HA onset and repeat in 2 hours if needed. Max 2 in 24 hours Class: Normal  
 Pharmacy: [facility] [address] [place] [address] Ph #: [phone] Follow-up Instructions Return in about 2 months (around 2017). Patient Instructions Arlen Berumen 1721 What is a living will? A living will is a legal form you use to write down the kind of care you want at the end of your life. It is used by the health professionals who will treat you if you aren't able to decide for yourself. If you put your wishes in writing, your loved ones and others will know what kind of care you want. They won't need to guess.  This can ease your mind and be helpful to others. A living will is not the same as an estate or property will. An estate will explains what you want to happen with your money and property after you die. Is a living will a legal document? A living will is a legal document. Each state has its own laws about living amanda. If you move to another state, make sure that your living will is legal in the state where you now live. Or you might use a universal form that has been approved by many states. This kind of form can sometimes be completed and stored online. Your electronic copy will then be available wherever you have a connection to the Internet. In most cases, doctors will respect your wishes even if you have a form from a different state. · You don't need an  to complete a living will. But legal advice can be helpful if your state's laws are unclear, your health history is complicated, or your family can't agree on what should be in your living will. · You can change your living will at any time. Some people find that their wishes about end-of-life care change as their health changes. · In addition to making a living will, think about completing a medical power of  form. This form lets you name the person you want to make end-of-life treatment decisions for you (your \"health care agent\") if you're not able to. Many hospitals and nursing homes will give you the forms you need to complete a living will and a medical power of . · Your living will is used only if you can't make or communicate decisions for yourself anymore. If you become able to make decisions again, you can accept or refuse any treatment, no matter what you wrote in your living will. · Your state may offer an online registry. This is a place where you can store your living will online so the doctors and nurses who need to treat you can find it right away. What should you think about when creating a living will? Talk about your end-of-life wishes with your family members and your doctor. Let them know what you want. That way the people making decisions for you won't be surprised by your choices. Think about these questions as you make your living will: · Do you know enough about life support methods that might be used? If not, talk to your doctor so you know what might be done if you can't breathe on your own, your heart stops, or you're unable to swallow. · What things would you still want to be able to do after you receive life-support methods? Would you want to be able to walk? To speak? To eat on your own? To live without the help of machines? · If you have a choice, where do you want to be cared for? In your home? At a hospital or nursing home? · Do you want certain Baptist practices performed if you become very ill? · If you have a choice at the end of your life, where would you prefer to die? At home? In a hospital or nursing home? Somewhere else? · Would you prefer to be buried or cremated? · Do you want your organs to be donated after you die? What should you do with your living will? · Make sure that your family members and your health care agent have copies of your living will. · Give your doctor a copy of your living will to keep in your medical record. If you have more than one doctor, make sure that each one has a copy. · You may want to put a copy of your living will where it can be easily found. Where can you learn more? Go to http://kirby-annalee.info/. Enter B265 in the search box to learn more about \"Learning About Living Ishan. \" Current as of: August 8, 2016 Content Version: 11.3 © 5779-6277 [employer]Force. Care instructions adapted under license by [employer] (which disclaims liability or warranty for this information).  If you have questions about a medical condition or this instruction, always ask your healthcare professional. Lorri [employer] disclaims any warranty or liability for your use of this information. Advance Directives: Care Instructions Your Care Instructions An advance directive is a legal way to state your wishes at the end of your life. It tells your family and your doctor what to do if you can no longer say what you want. There are two main types of advance directives. You can change them any time that your wishes change. · A living will tells your family and your doctor your wishes about life support and other treatment. · A durable power of  for health care lets you name a person to make treatment decisions for you when you can't speak for yourself. This person is called a health care agent. If you do not have an advance directive, decisions about your medical care may be made by a doctor or a  who doesn't know you. It may help to think of an advance directive as a gift to the people who care for you. If you have one, they won't have to make tough decisions by themselves. Follow-up care is a key part of your treatment and safety. Be sure to make and go to all appointments, and call your doctor if you are having problems. It's also a good idea to know your test results and keep a list of the medicines you take. How can you care for yourself at home? · Discuss your wishes with your loved ones and your doctor. This way, there are no surprises. · Many states have a unique form. Or you might use a universal form that has been approved by many states. This kind of form can sometimes be completed and stored online. Your electronic copy will then be available wherever you have a connection to the Internet. In most cases, doctors will respect your wishes even if you have a form from a different state. · You don't need a  to do an advance directive. But you may want to get legal advice. · Think about these questions when you prepare an advance directive: ¨ Who do you want to make decisions about your medical care if you are not able to? Many people choose a family member or close friend. ¨ Do you know enough about life support methods that might be used? If not, talk to your doctor so you understand. ¨ What are you most afraid of that might happen? You might be afraid of having pain, losing your independence, or being kept alive by machines. ¨ Where would you prefer to die? Choices include your home, a hospital, or a nursing home. ¨ Would you like to have information about hospice care to support you and your family? ¨ Do you want to donate organs when you die? ¨ Do you want certain Rastafari practices performed before you die? If so, put your wishes in the advance directive. · Read your advance directive every year, and make changes as needed. When should you call for help? Be sure to contact your doctor if you have any questions. Where can you learn more? Go to http://kirby-annalee.info/. Enter R264 in the search box to learn more about \"Advance Directives: Care Instructions. \" Current as of: November 17, 2016 Content Version: 11.3 © 6975-0636 Healthwise, Incorporated. Care instructions adapted under license by [employer] (which disclaims liability or warranty for this information). If you have questions about a medical condition or this instruction, always ask your healthcare professional. [address] any warranty or liability for your use of this information. PRESCRIPTION REFILL POLICY [facility] Statement to Patients April 1, 2014 In an effort to ensure the large volume of patient prescription refills is processed in the most efficient and expeditious manner, we are asking our patients to assist us by calling your Pharmacy for all prescription refills, this will include also your  Mail Order Pharmacy.  The pharmacy will contact our office electronically to continue the refill process. Please do not wait until the last minute to call your pharmacy. We need at least 48 hours (2days) to fill prescriptions. We also encourage you to call your pharmacy before going to  your prescription to make sure it is ready. With regard to controlled substance prescription refill requests (narcotic refills) that need to be picked up at our office, we ask your cooperation by providing us with at least 72 hours (3days) notice that you will need a refill. We will not refill narcotic prescription refill requests after 4:00pm on any weekday, Monday through Thursday, or after 2:00pm on Fridays, or on the weekends. We encourage everyone to explore another way of getting your prescription refill request processed using [employer], our patient web portal through our electronic medical record system. [employer] is an efficient and effective way to communicate your medication request directly to the office and  downloadable as an sanaz on your smart phone . [employer] also features a review functionality that allows you to view your medication list as well as leave messages for your physician. Are you ready to get connected? If so please review the attatched instructions or speak to any of our staff to get you set up right away! Thank you so much for your cooperation. Should you have any questions please contact our Practice Administrator. The Physicians and Staff,  [facility]! [facility] introduces [employer] patient portal. Now you can access parts of your medical record, email your doctor's office, and request medication refills online. 1. In your internet browser, go to [email or web address] 2. Click on the First Time User? Click Here link in the Sign In box. You will see the New Member Sign Up page. 3. Enter your OkBuy.com Access Code exactly as it appears below. You will not need to use this code after youve completed the sign-up process. If you do not sign up before the expiration date, you must request a new code. · [email or web address] Access Code: [email or web address] Expires: 11/5/2017  2:26 PM 
 
4. Enter the last four digits of your Social Security Number (xxxx) and Date of Birth (mm/dd/yyyy) as indicated and click Submit. You will be taken to the next sign-up page. 5. Create a OkBuy.com ID. This will be your OkBuy.com login ID and cannot be changed, so think of one that is secure and easy to remember. 6. Create a OkBuy.com password. You can change your password at any time. 7. Enter your Password Reset Question and Answer. This can be used at a later time if you forget your password. 8. Enter your e-mail address. You will receive e-mail notification when new information is available in [address]. 9. Click Sign Up. You can now view and download portions of your medical record. 10. Click the Download Summary menu link to download a portable copy of your medical information. If you have questions, please visit the Frequently Asked Questions section of the [email or web address] website. Remember, OkBuy.com is NOT to be used for urgent needs. For medical emergencies, dial 911. Now available from your iPhone and Android! Please provide this summary of care documentation to your next provider. Your primary care clinician is listed as Karla Fletcher. If you have any questions after today's visit, please call [phone].

## 2017-10-25 NOTE — PROGRESS NOTES
Skyla Albright is a 25 y.o. female who presents with the following  Chief Complaint   Patient presents with    Headache     daily for 5 yrs       HPI Patient comes in for a follow up for hospital stay for seizure and chronic daily headaches for about 5 years. She has had headaches for about this long and was recently tried on TOpamax and was on this for a few days but found out she was pregnant so came off. Did not stay on for very long and was low dose. She states her headaches are usually unilateral on the temples and sharp stabbing pain. She will get nausea, dizziness, light, sound, smell sensitive and this is with her migraines. She will get migrianes a few times a week but will have chronic headaches daily. She is currently on Procardia for her BP through OB and post partum. She states she did have a seizure in which she went to the hospital and did feel funny before this happened. She states she had a CT and EEG which were normal. No hx of seizures or family history. Allergies   Allergen Reactions    Aspirin Hoarseness    Ibuprofen Itching    Tylenol [Acetaminophen] Unknown (comments)     Stuffy nose and scratchy throat       Current Outpatient Prescriptions   Medication Sig    topiramate (TOPAMAX) 25 mg tablet 25 mg nightly for 1 week then, 50 mg nightly for 1 week then, 75 mg nightly for 1 week then, 100mg nightly thereafter.  topiramate (TOPAMAX) 100 mg tablet Take 1 tablet by mouth nightly    SUMAtriptan (IMITREX) 100 mg tablet 1 at HA onset and repeat in 2 hours if needed. Max 2 in 24 hours    NIFEdipine ER (PROCARDIA XL) 60 mg ER tablet Take 1 Tab by mouth daily. No current facility-administered medications for this visit.         History   Smoking Status    Never Smoker   Smokeless Tobacco    Never Used       Past Medical History:   Diagnosis Date    History of migraine 9/26/2017    Hypertension in pregnancy, preeclampsia, severe, postpartum condition 9/25/2017    Migraine     Syncope 9/26/2017    Trauma 2014    fell and dislocated r shoulder       No past surgical history on file. Family History   Problem Relation Age of Onset    No Known Problems Mother        Social History     Social History    Marital status: SINGLE     Spouse name: N/A    Number of children: N/A    Years of education: N/A     Social History Main Topics    Smoking status: Never Smoker    Smokeless tobacco: Never Used    Alcohol use No    Drug use: No    Sexual activity: Yes     Partners: Male     Birth control/ protection: None     Other Topics Concern    Not on file     Social History Narrative       Review of Systems   HENT: Negative for hearing loss and tinnitus. Eyes: Positive for blurred vision, double vision and photophobia. Respiratory: Negative for shortness of breath and wheezing. Gastrointestinal: Positive for nausea. Negative for vomiting. Musculoskeletal: Negative for falls. Neurological: Positive for dizziness, seizures and headaches. Negative for tingling, loss of consciousness and weakness. Remainder of comprehensive review is negative. Physical Exam :    Visit Vitals    /74    Resp 20    Ht 5' 3\" (1.6 m)    Wt 102.5 kg (226 lb)    BMI 40.03 kg/m2       General: Well defined, nourished, and groomed individual in no acute distress.    Neck: Supple, nontender, no bruits, no pain with resistance to active range of motion.    Heart: Regular rate and rhythm, no murmurs, rub, or gallop. Normal S1S2. Lungs: Clear to auscultation bilaterally with equal chest expansion, no cough, no wheeze  Musculoskeletal: Extremities revealed no edema and had full range of motion of joints.    Psych: Good mood and bright affect    NEUROLOGICAL EXAMINATION:    Mental Status: Alert and oriented to person, place, and time    Cranial Nerves:    II, III, IV, VI: Visual acuity grossly intact.  Visual fields are normal.    Pupils are equal, round, and reactive to light and accommodation.    Extra-ocular movements are full and fluid. Fundoscopic exam was benign, no ptosis or nystagmus.    V-XII: Hearing is grossly intact. Facial features are symmetric, with normal sensation and strength. The palate rises symmetrically and the tongue protrudes midline. Sternocleidomastoids 5/5. Motor Examination: Normal tone, bulk, and strength, 5/5 muscle strength throughout. Coordination: Finger to nose was normal. No resting or intention tremor    Gait and Station: Steady while walking. Normal arm swing. No pronator drift. No muscle wasting or fasiculations noted. Reflexes: DTRs 2+ throughout. Results for orders placed or performed during the hospital encounter of 09/25/17   TSH 3RD GENERATION   Result Value Ref Range    TSH 1.32 0.36 - 3.74 uIU/mL   CBC WITH AUTOMATED DIFF   Result Value Ref Range    WBC 8.3 3.6 - 11.0 K/uL    RBC 3.71 (L) 3.80 - 5.20 M/uL    HGB 11.3 (L) 11.5 - 16.0 g/dL    HCT 33.9 (L) 35.0 - 47.0 %    MCV 91.4 80.0 - 99.0 FL    MCH 30.5 26.0 - 34.0 PG    MCHC 33.3 30.0 - 36.5 g/dL    RDW 14.5 11.5 - 14.5 %    PLATELET 849 932 - 887 K/uL    NEUTROPHILS 69 32 - 75 %    LYMPHOCYTES 21 12 - 49 %    MONOCYTES 7 5 - 13 %    EOSINOPHILS 3 0 - 7 %    BASOPHILS 0 0 - 1 %    ABS. NEUTROPHILS 5.7 1.8 - 8.0 K/UL    ABS. LYMPHOCYTES 1.8 0.8 - 3.5 K/UL    ABS. MONOCYTES 0.6 0.0 - 1.0 K/UL    ABS. EOSINOPHILS 0.3 0.0 - 0.4 K/UL    ABS.  BASOPHILS 0.0 0.0 - 0.1 K/UL   METABOLIC PANEL, COMPREHENSIVE   Result Value Ref Range    Sodium 141 136 - 145 mmol/L    Potassium 3.7 3.5 - 5.1 mmol/L    Chloride 107 97 - 108 mmol/L    CO2 25 21 - 32 mmol/L    Anion gap 9 5 - 15 mmol/L    Glucose 119 (H) 65 - 100 mg/dL    BUN 7 6 - 20 MG/DL    Creatinine 0.99 0.55 - 1.02 MG/DL    BUN/Creatinine ratio 7 (L) 12 - 20      GFR est AA >60 >60 ml/min/1.73m2    GFR est non-AA >60 >60 ml/min/1.73m2    Calcium 7.9 (L) 8.5 - 10.1 MG/DL    Bilirubin, total 0.3 0.2 - 1.0 MG/DL    ALT (SGPT) 125 (H) 12 - 78 U/L    AST (SGOT) 146 (H) 15 - 37 U/L    Alk. phosphatase 92 45 - 117 U/L    Protein, total 6.0 (L) 6.4 - 8.2 g/dL    Albumin 2.6 (L) 3.5 - 5.0 g/dL    Globulin 3.4 2.0 - 4.0 g/dL    A-G Ratio 0.8 (L) 1.1 - 2.2     LD   Result Value Ref Range     (H) 81 - 246 U/L   URIC ACID   Result Value Ref Range    Uric acid 11.6 (H) 2.6 - 6.0 MG/DL       Orders Placed This Encounter    topiramate (TOPAMAX) 25 mg tablet     Si mg nightly for 1 week then, 50 mg nightly for 1 week then, 75 mg nightly for 1 week then, 100mg nightly thereafter. Dispense:  42 Tab     Refill:  0    topiramate (TOPAMAX) 100 mg tablet     Sig: Take 1 tablet by mouth nightly     Dispense:  30 Tab     Refill:  5    SUMAtriptan (IMITREX) 100 mg tablet     Si at HA onset and repeat in 2 hours if needed. Max 2 in 24 hours     Dispense:  12 Tab     Refill:  5       1. Migraine without status migrainosus, not intractable, unspecified migraine type        Follow-up Disposition:  Return in about 2 months (around 2017). Headaches and migraines. We will try Topamax again up to 100 mg nightly as this will help headaches and seizure prevention in adjunct. We will try Imitrex for abortive therapy of migraines as we discussed when and how to use this. Both ok with breastfeeding. [facility] performed all her CT and EEG so we do not need any testing at this time and will evaluate further if needed. Call with any issues.          This note will not be viewable in Biat

## 2018-02-12 ENCOUNTER — ED HISTORICAL/CONVERTED ENCOUNTER (OUTPATIENT)
Dept: OTHER | Age: 23
End: 2018-02-12

## 2018-02-13 ENCOUNTER — OFFICE VISIT (OUTPATIENT)
Dept: OBGYN CLINIC | Age: 23
End: 2018-02-13

## 2018-02-13 VITALS
DIASTOLIC BLOOD PRESSURE: 82 MMHG | BODY MASS INDEX: 36.86 KG/M2 | RESPIRATION RATE: 16 BRPM | WEIGHT: 208 LBS | SYSTOLIC BLOOD PRESSURE: 132 MMHG | HEIGHT: 63 IN

## 2018-02-13 DIAGNOSIS — O20.0 THREATENED ABORTION: Primary | ICD-10-CM

## 2018-02-13 LAB — HCG INTACT+B SERPL-ACNC: 3267 MIU/ML

## 2018-02-13 NOTE — PATIENT INSTRUCTIONS
Vaginal Bleeding in Nonpregnant Women: Care Instructions  Your Care Instructions    Many women have bleeding or spotting between periods. Lots of things can cause it. You may bleed because of hormone problems, stress, or ovulation. Fibroids and IUDs (intrauterine devices) can also cause bleeding. If your bleeding or spotting is caused by one of these things and is not heavy or doesn't happen often, you probably don't need to worry. But in rare cases, infection, cancer, or other serious conditions can cause bleeding. So you may need more tests to find the cause of your bleeding. The doctor has checked you carefully, but problems can develop later. If you notice any problems or new symptoms, get medical treatment right away. Follow-up care is a key part of your treatment and safety. Be sure to make and go to all appointments, and call your doctor if you are having problems. It's also a good idea to know your test results and keep a list of the medicines you take. How can you care for yourself at home? · Take pain medicines exactly as directed. ¨ If the doctor gave you a prescription medicine for pain, take it as prescribed. ¨ If you are not taking a prescription pain medicine, ask your doctor if you can take an over-the-counter medicine. Do not take aspirin, which may make bleeding worse. · If your doctor prescribed birth control pills for your bleeding, take them as directed. · Eat foods that are high in iron and vitamin C. Foods high in iron include red meat, shellfish, eggs, beans, and leafy green vegetables. Foods high in vitamin C include citrus fruits, tomatoes, and broccoli. Ask your doctor if you need to take iron pills or a multivitamin. · Ask your doctor when it is okay to have sex. When should you call for help? Call 911 anytime you think you may need emergency care. For example, call if:  ? · You passed out (lost consciousness).    ?Call your doctor now or seek immediate medical care if:  ? · You have severe vaginal bleeding. ? · You are dizzy or lightheaded, or you feel like you may faint. ? · You have new or worse belly or pelvic pain. ? Watch closely for changes in your health, and be sure to contact your doctor if:  ? · Your bleeding gets worse. ? · You think you might be pregnant. ? · You do not get better as expected. Where can you learn more? Go to http://kirby-annalee.info/. Enter I260 in the search box to learn more about \"Vaginal Bleeding in Nonpregnant Women: Care Instructions. \"  Current as of: October 13, 2016  Content Version: 11.4  © 5003-0173 [employer]. Care instructions adapted under license by [employer] (which disclaims liability or warranty for this information). If you have questions about a medical condition or this instruction, always ask your healthcare professional. [address] any warranty or liability for your use of this information.

## 2018-02-13 NOTE — PROGRESS NOTES
Threatened AB note    Ivett Hsu is a ,  25 y.o. female [address] No LMP recorded. She presents with spotting and cramping that started a couple days ago . The amount of bleeding is described as heavy and clotty . The cramps are described as the same. She has not passed tissue. She had a positive pregnancy test 1 week ago. She has had an exam at [facility] with labwork done. We do not have ultrasound records from that visit at this time. Will have US done today in office. Her hcg at that time was 3810. Her past medical history is not significant for risk factors for ectopic pregnancy. She has cramping and more intense pain on her right pelvic side. . The patient specifically denies right or left pelvic pain. She does not have a history of a spontaneous . She has not had a recent injury or trauma. Additional complaints: none. Past Medical History:   Diagnosis Date    History of migraine 2017    Hypertension in pregnancy, preeclampsia, severe, postpartum condition 2017    Migraine     Syncope 2017    Trauma 2014    fell and dislocated r shoulder     History reviewed. No pertinent surgical history. Social History     Occupational History    Not on file.      Social History Main Topics    Smoking status: Never Smoker    Smokeless tobacco: Never Used    Alcohol use No    Drug use: No    Sexual activity: Yes     Partners: Male     Birth control/ protection: None     Family History   Problem Relation Age of Onset    No Known Problems Mother        Allergies   Allergen Reactions    Aspirin Hoarseness    Ibuprofen Itching    Tylenol [Acetaminophen] Unknown (comments)     Stuffy nose and scratchy throat     Prior to Admission medications    Medication Sig Start Date End Date Taking? Authorizing Provider   topiramate (TOPAMAX) 25 mg tablet 25 mg nightly for 1 week then, 50 mg nightly for 1 week then, 75 mg nightly for 1 week then, 100mg nightly thereafter. 10/25/17  Yes Marlin Will NP   topiramate (TOPAMAX) 100 mg tablet Take 1 tablet by mouth nightly 10/25/17  Yes Marlin Will NP   SUMAtriptan (IMITREX) 100 mg tablet 1 at HA onset and repeat in 2 hours if needed. Max 2 in 24 hours 10/25/17  Yes Marlin Will NP   NIFEdipine ER (PROCARDIA XL) 60 mg ER tablet Take 1 Tab by mouth daily.  9/27/17  Yes Chung Santiago MD        Review of Systems: History obtained from the patient  Constitutional: negative for weight loss, fever, night sweats  Breast: negative for breast lumps, nipple discharge, galactorrhea  GI: negative for change in bowel habits, abdominal pain, black or bloody stools  : negative for frequency, dysuria, hematuria, vaginal discharge  MSK: negative for back pain, joint pain, muscle pain  Skin: negative for itching, rash, hives  Psych: negative for anxiety, depression, change in mood      Objective:  Visit Vitals    /82 (BP 1 Location: Right arm, BP Patient Position: Sitting)    Resp 16    Ht 5' 3\" (1.6 m)    Wt 208 lb (94.3 kg)    BMI 36.85 kg/m2       Physical Exam:   PHYSICAL EXAMINATION    Constitutional  · Appearance: well-nourished, well developed, alert, in no acute distress    Gastrointestinal  · Abdominal Examination: abdomen non-tender to palpation, normal bowel sounds, no masses present  · Liver and spleen: no hepatomegaly present, spleen not palpable  · Hernias: no hernias identified    Skin  · General Inspection: no rash, no lesions identified    Neurologic/Psychiatric  · Mental Status:  · Orientation: grossly oriented to person, place and time  · Mood and Affect: mood normal, affect appropriate    Assessment/Plan:   Threatened AB- likely miscarriage vs ectopic. Will get ultrasound today, repeat hcg, and get outside records. Strict precautions discussed. Discussed MTX if increased hcg and no IUP. Addendum:  TV ULTRASOUND PERFORMED. A 5W3D GESTATIONAL SAC IS SEEN. NO FETAL POLE IS SEEN AT THIS TIME. GESTATIONAL AGE BASED ON TODAYS US.  A NORMAL APPEARING YOLK [address] IS SEEN. RIGHT OVARY APPEARS WITHIN NORMAL LIMITS. LEFT OVARY APPEARS TO HAVE A SOLID MASS WITH BLOODFLOW IN THE PERIPHERY THAT MEASURES  1.9 X 1.5 X 1.9CM. THIS MOST LIKELY REPRESENTS A COLLAPSED HEMORRHAGIC CL CYST.  MODERATE FREE FLUID SEEN IN THE CD    Repeat HCG 3267, will repeat in 2 days  (as last hcg drawn at a different lab) if levels continue to drop then likely miscarriage and will discuss observation vs medical vs surgical management.

## 2018-02-15 ENCOUNTER — LAB ONLY (OUTPATIENT)
Dept: OBGYN CLINIC | Age: 23
End: 2018-02-15

## 2018-02-15 DIAGNOSIS — O20.0 THREATENED ABORTION: Primary | ICD-10-CM

## 2018-02-16 ENCOUNTER — TELEPHONE (OUTPATIENT)
Dept: OBGYN CLINIC | Age: 23
End: 2018-02-16

## 2018-02-16 LAB — HCG INTACT+B SERPL-ACNC: 5206 MIU/ML

## 2018-02-16 NOTE — TELEPHONE ENCOUNTER
Call received at 4:05PM      FW 25year old patient had repeat HcG and is wondering about her results.       Please advise

## 2018-02-16 NOTE — TELEPHONE ENCOUNTER
Beta is increasing.   Rec fu FW/MD on Monday and repeat US or to ER if severe sx pain/bleeding    Danuta Shepard MD

## 2018-02-19 NOTE — TELEPHONE ENCOUNTER
This nurse left a detailed  message for the patient to call back to schedule and ultrasound and follow up.

## 2018-02-19 NOTE — TELEPHONE ENCOUNTER
This nurse left a detailed message for the patient to call back in the am after 8:30 am that MD wants her to come in.

## 2018-02-20 ENCOUNTER — TELEPHONE (OUTPATIENT)
Dept: OBGYN CLINIC | Age: 23
End: 2018-02-20

## 2018-02-20 NOTE — TELEPHONE ENCOUNTER
This nurse attempted to call patient to let her know about her appt that is scheduled tomorrow with an US but was unable to leave a message due to the patient's vmail box not being set up.

## 2018-02-20 NOTE — TELEPHONE ENCOUNTER
This nurse left a detailed message for the patient to call back regarding her lab work and need for appointment.

## 2018-02-20 NOTE — TELEPHONE ENCOUNTER
This nurse sent a letter to patient address on the chart, due to not being able to reach patient via phone.

## 2019-10-18 ENCOUNTER — ED HISTORICAL/CONVERTED ENCOUNTER (OUTPATIENT)
Dept: OTHER | Age: 24
End: 2019-10-18

## 2020-03-09 ENCOUNTER — OP HISTORICAL/CONVERTED ENCOUNTER (OUTPATIENT)
Dept: OTHER | Age: 25
End: 2020-03-09

## 2020-03-10 ENCOUNTER — IP HISTORICAL/CONVERTED ENCOUNTER (OUTPATIENT)
Dept: OTHER | Age: 25
End: 2020-03-10

## 2020-07-31 VITALS
HEIGHT: 63 IN | TEMPERATURE: 98.5 F | HEART RATE: 98 BPM | SYSTOLIC BLOOD PRESSURE: 116 MMHG | WEIGHT: 241 LBS | DIASTOLIC BLOOD PRESSURE: 77 MMHG | BODY MASS INDEX: 42.7 KG/M2 | OXYGEN SATURATION: 97 %

## 2020-07-31 RX ORDER — BISMUTH SUBSALICYLATE 262 MG
1 TABLET,CHEWABLE ORAL DAILY
COMMUNITY

## 2020-07-31 RX ORDER — URSODIOL 300 MG/1
300 CAPSULE ORAL 2 TIMES DAILY
COMMUNITY

## 2020-07-31 RX ORDER — LANOLIN ALCOHOL/MO/W.PET/CERES
1000 CREAM (GRAM) TOPICAL DAILY
COMMUNITY

## 2020-08-03 ENCOUNTER — OFFICE VISIT (OUTPATIENT)
Dept: SURGERY | Age: 25
End: 2020-08-03
Payer: MEDICAID

## 2020-08-03 VITALS
HEART RATE: 61 BPM | WEIGHT: 207 LBS | SYSTOLIC BLOOD PRESSURE: 113 MMHG | DIASTOLIC BLOOD PRESSURE: 80 MMHG | OXYGEN SATURATION: 98 % | TEMPERATURE: 97.7 F | BODY MASS INDEX: 36.68 KG/M2 | HEIGHT: 63 IN

## 2020-08-03 DIAGNOSIS — Z98.84 STATUS POST BARIATRIC SURGERY: Primary | ICD-10-CM

## 2020-08-03 DIAGNOSIS — E66.01 OBESITY, MORBID (HCC): ICD-10-CM

## 2020-08-03 DIAGNOSIS — R11.10 REGURGITATION OF FOOD: ICD-10-CM

## 2020-08-03 DIAGNOSIS — Z98.84 STATUS POST BARIATRIC SURGERY: ICD-10-CM

## 2020-08-03 PROCEDURE — 99214 OFFICE O/P EST MOD 30 MIN: CPT | Performed by: SURGERY

## 2020-08-03 RX ORDER — CALCIUM CARBONATE/VITAMIN D3 500 MG-10
TABLET ORAL
COMMUNITY

## 2020-08-03 NOTE — PROGRESS NOTES
[facility]  [address], [address] [facility]  [phone]    Bariatric Surgery Follow up    Patient Name: Landry Su (86 y.o., female)    Patient Address: [address] [place] [address]    PCP: Melina Olson MD     Patient contact numbers:     Home Phone  Work Phone  Mobile    [phone]   Patient Allergies:  Aspirin; Ibuprofen; and Tylenol [acetaminophen]        Temp: 97.7 °F (36.5 °C) (08/03/20 0927) Pulse (Heart Rate): 61 (08/03/20 0927) Resp: (not recorded) BP: 113/80 (08/03/20 0927) O2 Sat (%): 98 % (08/03/20 0927) Weight: 207 lb (93.9 kg) (08/03/20 0927)     Current Outpatient Medications   Medication Sig Dispense Refill    Calcium-Cholecalciferol, D3, 500 mg(1,250mg) -400 unit tab Take  by mouth.  cyanocobalamin (Vitamin B-12) 1,000 mcg tablet Take 1,000 mcg by mouth daily.  multivitamin (ONE A DAY) tablet Take 1 Tab by mouth daily.  calcium carbonate (MYLANTA) 400 mg chew Take 1 Tab by mouth three (3) times daily (with meals).  ursodioL (ACTIGALL) 300 mg capsule Take 300 mg by mouth two (2) times a day.           Patient Active Problem List   Diagnosis Code    Supervision of normal first pregnancy Z34.00    Pregnancy Z34.90    Hypertension in pregnancy, preeclampsia, severe, postpartum condition O14.15    History of migraine Z86.69    Syncope R55    Convulsions ([address] R56.9    Status post bariatric surgery Z98.84    Obesity, morbid ([address] E66.01    Regurgitation of food R11.10       Family History   Problem Relation Age of Onset    Diabetes Mother     Diabetes Father     Diabetes Sister     Hypertension Sister        Social History     Socioeconomic History    Marital status: SINGLE     Spouse name: Not on file    Number of children: Not on file    Years of education: Not on file    Highest education level: Not on file   Occupational History    Not on file   Social Needs    Financial resource strain: Not on file    Food insecurity     Worry: Not on file     Inability: Not on file    Transportation needs     Medical: Not on file     Non-medical: Not on file   Tobacco Use    Smoking status: Never Smoker    Smokeless tobacco: Never Used   Substance and Sexual Activity    Alcohol use: Not Currently    Drug use: No    Sexual activity: Not Currently     Partners: Male   Lifestyle    Physical activity     Days per week: Not on file     Minutes per session: Not on file    Stress: Not on file   Relationships    Social connections     Talks on phone: Not on file     Gets together: Not on file     Attends Congregational service: Not on file     Active member of club or organization: Not on file     Attends meetings of clubs or organizations: Not on file     Relationship status: Not on file    Intimate partner violence     Fear of current or ex partner: Not on file     Emotionally abused: Not on file     Physically abused: Not on file     Forced sexual activity: Not on file   Other Topics Concern    Not on file   Social History Narrative    Not on file       Past Surgical History:   Procedure Laterality Date    GASTRIC BYPASS,OBESE<150CM SAAD-EN-Y  03/05/2020       Past Medical History:   Diagnosis Date    Anxiety     Back pain     Depression     History of migraine 9/26/2017    Hypertension in pregnancy, preeclampsia, severe, postpartum condition 9/25/2017    Migraine     Morbid obesity ([facility] 03/10/2020    s/p RYGB     Numbness and tingling in both hands     Sleep apnea     Syncope 9/26/2017    Trauma 2014    fell and dislocated r shoulder           History of Present Illness    Patient presents for follow up after weight loss surgery.   Procedure: Robotic Saad-en-Y gastric bypass  Date of procedure: 3/10/2020    Protein intake: 30g  Fluid intake: 64 ounces  Exercise: Walking  Vitamins: MVI, calcium with D, B12  Symptoms: Patient returns for follow-up after recent robotic Saad-en-Y gastric bypass. She is doing well. She is tolerating her fluid intake. She is only getting about 1 bottle of protein in per day. She is walking for exercise. She is taking her vitamins and supplements as directed. She has occasional nausea and constipation. She has some mild reflux. She denies fevers, chills, hospitalizations or surgeries since her last office visit with me. 5/7/2020   This visit was completed virtually using doxy. me after informed consent was obtained from the patient. Patient is doing well from a surgical standpoint. She is tolerating her protein and fluid goals. She is walking 4 times per week for 1 mile for exercise. She is taking her vitamins and supplements as directed. She denies abdominal pain, nausea, vomiting, diarrhea, constipation, reflux. 8/3/2020  Patient presents for routine follow-up. She admits to getting her goals of fluid and protein in without difficulty, but does have difficulty eating solid food. When she eats anything with a consistency of chicken salad, she feels like the food gets stuck in the lower part of her esophagus or in her stomach and does not want to pass and she ends up regurgitating. She remains on a puréed, full liquid diet, as a result. She does go to the gym 5 days/week for 1 hour. She also has been taking her vitamins and supplements as directed. She states that she had her labs drawn last week on Wednesday but I do not have the results to review with her. She denies noticing blood in her vomit. She denies blood in her stool. She denies heartburn, reflux, diarrhea, constipation, chronic nausea. Weight History  8/19/2019: Consult weight: 273 pounds  3/5/2020: Pre-op weight: 281. 5 pounds  3/26/2020: Current weight: 266 pounds   5/7/2020: 241 lbs   8/3/2020: 207 pounds    Review of Systems  Review of Systems   Constitutional: Negative for chills and fever.    HENT: Negative for ear pain, hearing loss, nosebleeds, sinus pain and tinnitus. Eyes: Negative for blurred vision and pain. Respiratory: Negative for cough, hemoptysis, shortness of breath and wheezing. Cardiovascular: Negative for chest pain, palpitations and leg swelling. Gastrointestinal: Positive for vomiting. Negative for abdominal pain, blood in stool, constipation, diarrhea, heartburn, melena and nausea. Genitourinary: Negative for dysuria, frequency and hematuria. Musculoskeletal: Negative for back pain, joint pain and myalgias. Skin: Negative for itching and rash. Neurological: Negative for dizziness, tingling, tremors, loss of consciousness and headaches. Endo/Heme/Allergies: Does not bruise/bleed easily. Psychiatric/Behavioral: Negative for depression and substance abuse. The patient is not nervous/anxious. Physical Exam  Visit Vitals  /80 (BP 1 Location: Left arm, BP Patient Position: Sitting)   Pulse 61   Temp 97.7 °F (36.5 °C) (Oral)   Ht 5' 3\" (1.6 m)   Wt 207 lb (93.9 kg)   SpO2 98%   BMI 36.67 kg/m²     General:  Alert, cooperative, no distress. Head:  Normocephalic, without obvious abnormality, atraumatic. Eyes:  Conjunctivae/corneas clear. Pupils equal, round, reactive to light. Extraocular movements intact. Lungs:   Clear to auscultation bilaterally. Chest wall:  No tenderness or deformity. Heart:  Regular rate and rhythm, S1, S2 normal, no murmur, click, rub, or gallop. Abdomen:   Soft, non-tender. Bowel sounds normal. No masses. No organomegaly, incisions have healed normally   Extremities: Extremities normal, atraumatic, no cyanosis or edema. Pulses: 2+ and symmetric all extremities. Skin: Skin color, texture, turgor normal. No rashes or lesions. Lymph nodes: Cervical, supraclavicular, and axillary nodes normal.   Neurologic: CNII-XII intact. Normal strength, sensation, and reflexes throughout.        Assessment    Problem List Items Addressed This Visit        Other    Status post bariatric surgery - Primary Obesity, morbid (HCC)    Regurgitation of food    Relevant Medications    calcium carbonate (MYLANTA) 400 mg chew          Plan  81-year-old female status post robotic Saad-en-Y gastric bypass on 3/10/2020.  1.  Regurgitation with solid food 4 months after gastric bypass  2. Plan for upper endoscopy with dilation on 8/12/2020.  3. The patient was counseled about the risks of brandon Covid-19 during their perioperative period and any recovery window from their procedure. The patient was made aware that brandon Covid-19 may worsen their prognosis for recovering from their procedure and lend to a higher morbidity and/or mortality risk. All material risks, benefits, and reasonable alternatives including postponing the procedure were discussed. The patient DOES wish to proceed with their procedure at this time.

## 2020-08-03 NOTE — LETTER
PATIENT NAME:  Landry Su ADDRESS:   [address] [place] [id number] :    1995 Department : [facility] Description: upper endoscopy procedure (EGD) (PROC)  With dilation 1. start IV 1000 mL lactated Ringer's at 50 mL per hour 2. If patient has renal disease, start  mL normal saline at 20 mL per hour 3. May use lidocaine 1% up to 2 mL intradermal prior to the cannulation 4. Perform Accu-Chek on all diabetic patients 5. Oxygen at 4 L/m per nasal cannula during the procedure 6. Discontinue IV prior to discharge 7. Discharge home when comfortable and meets discharge criteria 8. May access Port-A-Cath if patient has one. Prior to discharge plus with 10 mL of normal saline and 5 mL heparin lock flush Diagnosis: (Z98.98 ) ICD-10: ( ) Admission Status: Same Day Anesthesia Consult: Monitored Anesthesia Care Date of Procedure: ( 2020) Time of Procedure: ( ) Procedure Code: 41246 Prior Authorization # : ( ) 
 
 
 
____________________________________________________________ Ordering Provider: Stephanie Vanegas MD                   8/3/2020         TIME

## 2020-08-05 ENCOUNTER — TELEPHONE (OUTPATIENT)
Dept: SURGERY | Age: 25
End: 2020-08-05

## 2020-08-05 NOTE — TELEPHONE ENCOUNTER
Pt needs to reschedule her procedure on 8/12. She needs to find  before scheduling. Please call pt.  Thank you

## 2020-08-25 RX ORDER — THIAMINE HCL 250 MG
250 TABLET ORAL DAILY
Qty: 90 TAB | Refills: 1 | Status: SHIPPED | OUTPATIENT
Start: 2020-08-25 | End: 2020-11-23

## 2021-02-11 ENCOUNTER — TELEPHONE (OUTPATIENT)
Dept: OBGYN CLINIC | Age: 26
End: 2021-02-11

## 2021-02-11 ENCOUNTER — HOSPITAL ENCOUNTER (EMERGENCY)
Age: 26
Discharge: HOME | End: 2021-02-11
Payer: COMMERCIAL

## 2021-02-11 ENCOUNTER — APPOINTMENT (OUTPATIENT)
Dept: GENERAL RADIOLOGY | Age: 26
End: 2021-02-11
Attending: NURSE PRACTITIONER
Payer: COMMERCIAL

## 2021-02-11 ENCOUNTER — APPOINTMENT (OUTPATIENT)
Dept: CT IMAGING | Age: 26
End: 2021-02-11
Attending: NURSE PRACTITIONER
Payer: COMMERCIAL

## 2021-02-11 VITALS
WEIGHT: 168 LBS | OXYGEN SATURATION: 100 % | RESPIRATION RATE: 16 BRPM | DIASTOLIC BLOOD PRESSURE: 85 MMHG | BODY MASS INDEX: 29.77 KG/M2 | SYSTOLIC BLOOD PRESSURE: 135 MMHG | HEART RATE: 77 BPM | HEIGHT: 63 IN | TEMPERATURE: 98.8 F

## 2021-02-11 DIAGNOSIS — R10.9 RIGHT SIDED ABDOMINAL PAIN: ICD-10-CM

## 2021-02-11 DIAGNOSIS — R11.2 NON-INTRACTABLE VOMITING WITH NAUSEA, UNSPECIFIED VOMITING TYPE: Primary | ICD-10-CM

## 2021-02-11 LAB
ANION GAP SERPL CALC-SCNC: 4 MMOL/L (ref 5–15)
APPEARANCE UR: CLEAR
BACTERIA URNS QL MICRO: NEGATIVE /HPF
BACTERIA URNS QL MICRO: NEGATIVE /HPF
BASOPHILS # BLD: 0 K/UL (ref 0–0.1)
BASOPHILS NFR BLD: 0 % (ref 0–1)
BILIRUB UR QL: NEGATIVE
BUN SERPL-MCNC: 15 MG/DL (ref 6–20)
BUN/CREAT SERPL: 20 (ref 12–20)
CA-I BLD-MCNC: 9.3 MG/DL (ref 8.5–10.1)
CHLORIDE SERPL-SCNC: 109 MMOL/L (ref 97–108)
CO2 SERPL-SCNC: 27 MMOL/L (ref 21–32)
COLOR UR: ABNORMAL
CREAT SERPL-MCNC: 0.75 MG/DL (ref 0.55–1.02)
DIFFERENTIAL METHOD BLD: NORMAL
EOSINOPHIL # BLD: 0.2 K/UL (ref 0–0.4)
EOSINOPHIL NFR BLD: 5 % (ref 0–7)
ERYTHROCYTE [DISTWIDTH] IN BLOOD BY AUTOMATED COUNT: 14.2 % (ref 11.5–14.5)
GLUCOSE SERPL-MCNC: 97 MG/DL (ref 65–100)
GLUCOSE UR STRIP.AUTO-MCNC: NEGATIVE MG/DL
HCG UR QL: NEGATIVE
HCT VFR BLD AUTO: 38.1 % (ref 35–47)
HGB BLD-MCNC: 12.8 G/DL (ref 11.5–16)
HGB UR QL STRIP: NEGATIVE
IMM GRANULOCYTES # BLD AUTO: 0 K/UL (ref 0–0.04)
IMM GRANULOCYTES NFR BLD AUTO: 0 % (ref 0–0.5)
KETONES UR QL STRIP.AUTO: 5 MG/DL
LEUKOCYTE ESTERASE UR QL STRIP.AUTO: NEGATIVE
LIPASE SERPL-CCNC: 112 U/L (ref 73–393)
LYMPHOCYTES # BLD: 1.9 K/UL (ref 0.8–3.5)
LYMPHOCYTES NFR BLD: 42 % (ref 12–49)
MCH RBC QN AUTO: 31.1 PG (ref 26–34)
MCHC RBC AUTO-ENTMCNC: 33.6 G/DL (ref 30–36.5)
MCV RBC AUTO: 92.5 FL (ref 80–99)
MONOCYTES # BLD: 0.3 K/UL (ref 0–1)
MONOCYTES NFR BLD: 7 % (ref 5–13)
MUCOUS THREADS URNS QL MICRO: ABNORMAL /LPF
MUCOUS THREADS URNS QL MICRO: ABNORMAL /LPF
NEUTS SEG # BLD: 2.1 K/UL (ref 1.8–8)
NEUTS SEG NFR BLD: 46 % (ref 32–75)
NITRITE UR QL STRIP.AUTO: NEGATIVE
PH UR STRIP: 5 [PH] (ref 5–8)
PLATELET # BLD AUTO: 305 K/UL (ref 150–400)
PMV BLD AUTO: 9.7 FL (ref 8.9–12.9)
POTASSIUM SERPL-SCNC: 3.8 MMOL/L (ref 3.5–5.1)
PROT UR STRIP-MCNC: 30 MG/DL
RBC # BLD AUTO: 4.12 M/UL (ref 3.8–5.2)
RBC #/AREA URNS HPF: ABNORMAL /HPF (ref 0–5)
RBC #/AREA URNS HPF: ABNORMAL /HPF (ref 0–5)
SODIUM SERPL-SCNC: 140 MMOL/L (ref 136–145)
SP GR UR REFRACTOMETRY: >1.03 (ref 1–1.03)
UROBILINOGEN UR QL STRIP.AUTO: 4 EU/DL (ref 0.1–1)
WBC # BLD AUTO: 4.5 K/UL (ref 3.6–11)
WBC URNS QL MICRO: ABNORMAL /HPF (ref 0–4)
WBC URNS QL MICRO: ABNORMAL /HPF (ref 0–4)

## 2021-02-11 PROCEDURE — 81025 URINE PREGNANCY TEST: CPT

## 2021-02-11 PROCEDURE — 36415 COLL VENOUS BLD VENIPUNCTURE: CPT

## 2021-02-11 PROCEDURE — 81001 URINALYSIS AUTO W/SCOPE: CPT

## 2021-02-11 PROCEDURE — 96374 THER/PROPH/DIAG INJ IV PUSH: CPT

## 2021-02-11 PROCEDURE — 74011250636 HC RX REV CODE- 250/636: Performed by: NURSE PRACTITIONER

## 2021-02-11 PROCEDURE — 80048 BASIC METABOLIC PNL TOTAL CA: CPT

## 2021-02-11 PROCEDURE — 83690 ASSAY OF LIPASE: CPT

## 2021-02-11 PROCEDURE — 85025 COMPLETE CBC W/AUTO DIFF WBC: CPT

## 2021-02-11 PROCEDURE — 99282 EMERGENCY DEPT VISIT SF MDM: CPT

## 2021-02-11 PROCEDURE — 74011250637 HC RX REV CODE- 250/637: Performed by: NURSE PRACTITIONER

## 2021-02-11 RX ORDER — ONDANSETRON 4 MG/1
4 TABLET, ORALLY DISINTEGRATING ORAL
Qty: 10 TAB | Refills: 0 | Status: SHIPPED | OUTPATIENT
Start: 2021-02-11

## 2021-02-11 RX ORDER — ACETAMINOPHEN 325 MG/1
650 TABLET ORAL ONCE
Status: COMPLETED | OUTPATIENT
Start: 2021-02-11 | End: 2021-02-11

## 2021-02-11 RX ORDER — ONDANSETRON 2 MG/ML
4 INJECTION INTRAMUSCULAR; INTRAVENOUS
Status: COMPLETED | OUTPATIENT
Start: 2021-02-11 | End: 2021-02-11

## 2021-02-11 RX ADMIN — SODIUM CHLORIDE 1000 ML: 9 INJECTION, SOLUTION INTRAVENOUS at 09:06

## 2021-02-11 RX ADMIN — ONDANSETRON 4 MG: 2 INJECTION INTRAMUSCULAR; INTRAVENOUS at 09:41

## 2021-02-11 RX ADMIN — ACETAMINOPHEN 650 MG: 325 TABLET ORAL at 10:01

## 2021-02-11 NOTE — TELEPHONE ENCOUNTER
Yes, or she could repeat the test in a few days using the first urine of the morning (most concentrated).

## 2021-02-11 NOTE — ED PROVIDER NOTES
EMERGENCY DEPARTMENT HISTORY AND PHYSICAL EXAM      Date: 2/11/2021  Patient Name: Kvng Zuñiga      History of Presenting Illness     Chief Complaint   Patient presents with    Abdominal Pain       History Provided By: Patient    HPI: Kvng Zuñiga, 26 y.o. female with a past medical history significant Gastric bypass March 10, 2020 presents to the ED with cc of generalized abdominal pain, cramping, burning, 10 out of 10 accompanied with nausea and vomiting since Saturday evening. Patient denies any use of medications or alleviating factors. Abdominal pain currently 6/10. Patient reports only being able to hold down a fruit cup yesterday decided to come to the emergency room for further evaluation due to history of gastric bypass in March 10, 2020 by Dr. Love Camilo. patient reports being late on her menstrual with 2 negative home test 1 inconclusive. Last BM Sunday however, denies constipation or blood in stool. Patient denies any fever, chills, chest pain, shortness of breath, urinary frequency urgency or burning. Denies any smoking or drug use. Occasional ETOH use. There are no other complaints, changes, or physical findings at this time. PCP: Rahat Abbasi MD    Current Outpatient Medications   Medication Sig Dispense Refill    ondansetron (Zofran ODT) 4 mg disintegrating tablet Take 1 Tab by mouth every eight (8) hours as needed for Nausea. 10 Tab 0    calcium carbonate (MYLANTA) 400 mg chew Take 1 Tab by mouth three (3) times daily (with meals).  Calcium-Cholecalciferol, D3, 500 mg(1,250mg) -400 unit tab Take  by mouth.  cyanocobalamin (Vitamin B-12) 1,000 mcg tablet Take 1,000 mcg by mouth daily.  multivitamin (ONE A DAY) tablet Take 1 Tab by mouth daily.  ursodioL (ACTIGALL) 300 mg capsule Take 300 mg by mouth two (2) times a day.          Past History     Past Medical History:  Past Medical History:   Diagnosis Date    Anxiety     Back pain     Depression     History of migraine 9/26/2017    Hypertension in pregnancy, preeclampsia, severe, postpartum condition 9/25/2017    Migraine     Morbid obesity ([facility] [address] 03/10/2020    s/p RYGB     Numbness and tingling in both hands     Sleep apnea     Syncope 9/26/2017    Trauma 2014    fell and dislocated r shoulder       Past Surgical History:  Past Surgical History:   Procedure Laterality Date    GASTRIC BYPASS,OBESE<150CM LEONARDA-EN-Y  03/05/2020       Family History:  Family History   Problem Relation Age of Onset    Diabetes Mother     Diabetes Father     Diabetes Sister     Hypertension Sister        Social History:  Social History     Tobacco Use    Smoking status: Never Smoker    Smokeless tobacco: Never Used   Substance Use Topics    Alcohol use: Not Currently    Drug use: No       Allergies: Allergies   Allergen Reactions    Aspirin Hoarseness    Ibuprofen Itching    Nsaids (Non-Steroidal Anti-Inflammatory Drug) Unknown (comments)    Tylenol [Acetaminophen] Not Reported This Time     Not an allergy         Review of Systems     Review of Systems   Constitutional: Negative for chills and fever. Respiratory: Negative for cough and shortness of breath. Cardiovascular: Negative for chest pain. Gastrointestinal: Positive for abdominal pain, nausea and vomiting. Negative for blood in stool, constipation and diarrhea. Genitourinary: Negative for difficulty urinating, dysuria, flank pain, frequency, pelvic pain, urgency and vaginal bleeding. Neurological: Negative for dizziness and light-headedness. Physical Exam     Physical Exam  Constitutional:       General: She is not in acute distress. Appearance: She is well-developed and normal weight. She is not ill-appearing or toxic-appearing. HENT:      Head: Normocephalic and atraumatic. Mouth/Throat:      Mouth: Mucous membranes are moist.   Eyes:      Extraocular Movements: Extraocular movements intact.    Cardiovascular:      Rate and Rhythm: Normal rate and regular rhythm. Pulses:           Radial pulses are 2+ on the right side and 2+ on the left side. Dorsalis pedis pulses are 2+ on the right side and 2+ on the left side. Pulmonary:      Effort: Pulmonary effort is normal. No respiratory distress. Breath sounds: No wheezing or rhonchi. Abdominal:      General: Bowel sounds are normal.      Palpations: Abdomen is soft. Tenderness: There is abdominal tenderness in the right upper quadrant. There is no right CVA tenderness, left CVA tenderness or guarding. Skin:     General: Skin is warm and dry. Capillary Refill: Capillary refill takes less than 2 seconds. Neurological:      Mental Status: She is alert and oriented to person, place, and time. Lab and Diagnostic Study Results     Labs -     Recent Results (from the past 12 hour(s))   CBC WITH AUTOMATED DIFF    Collection Time: 02/11/21  9:00 AM   Result Value Ref Range    WBC 4.5 3.6 - 11.0 K/uL    RBC 4.12 3.80 - 5.20 M/uL    HGB 12.8 11.5 - 16.0 g/dL    HCT 38.1 35.0 - 47.0 %    MCV 92.5 80.0 - 99.0 FL    MCH 31.1 26.0 - 34.0 PG    MCHC 33.6 30.0 - 36.5 g/dL    RDW 14.2 11.5 - 14.5 %    PLATELET 692 913 - 781 K/uL    MPV 9.7 8.9 - 12.9 FL    NEUTROPHILS 46 32 - 75 %    LYMPHOCYTES 42 12 - 49 %    MONOCYTES 7 5 - 13 %    EOSINOPHILS 5 0 - 7 %    BASOPHILS 0 0 - 1 %    IMMATURE GRANULOCYTES 0 0.0 - 0.5 %    ABS. NEUTROPHILS 2.1 1.8 - 8.0 K/UL    ABS. LYMPHOCYTES 1.9 0.8 - 3.5 K/UL    ABS. MONOCYTES 0.3 0.0 - 1.0 K/UL    ABS. EOSINOPHILS 0.2 0.0 - 0.4 K/UL    ABS. BASOPHILS 0.0 0.0 - 0.1 K/UL    ABS. IMM.  GRANS. 0.0 0.00 - 0.04 K/UL    DF AUTOMATED     METABOLIC PANEL, BASIC    Collection Time: 02/11/21  9:00 AM   Result Value Ref Range    Sodium 140 136 - 145 mmol/L    Potassium 3.8 3.5 - 5.1 mmol/L    Chloride 109 (H) 97 - 108 mmol/L    CO2 27 21 - 32 mmol/L    Anion gap 4 (L) 5 - 15 mmol/L    Glucose 97 65 - 100 mg/dL    BUN 15 6 - 20 mg/dL    Creatinine 0.75 0.55 - 1.02 mg/dL    BUN/Creatinine ratio 20 12 - 20      GFR est AA >60 >60 ml/min/1.73m2    GFR est non-AA >60 >60 ml/min/1.73m2    Calcium 9.3 8.5 - 10.1 mg/dL   LIPASE    Collection Time: 02/11/21  9:00 AM   Result Value Ref Range    Lipase 112 73 - 393 U/L   URINALYSIS W/ RFLX MICROSCOPIC    Collection Time: 02/11/21  9:00 AM   Result Value Ref Range    Color Yellow/Straw      Appearance Clear Clear      Specific gravity >1.030 (H) 1.003 - 1.030    pH (UA) 5.0 5.0 - 8.0      Protein 30 (A) Negative mg/dL    Glucose Negative Negative mg/dL    Ketone 5 (A) Negative mg/dL    Bilirubin Negative Negative      Blood Negative Negative      Urobilinogen 4.0 (H) 0.1 - 1.0 EU/dL    Nitrites Negative Negative      Leukocyte Esterase Negative Negative      WBC 0-5 0 - 4 /hpf    RBC 0-5 0 - 5 /hpf    Bacteria Negative Negative /hpf    Mucus 4+ /lpf   HCG URINE, QL    Collection Time: 02/11/21  9:00 AM   Result Value Ref Range    HCG urine, QL Negative Negative     URINE MICROSCOPIC    Collection Time: 02/11/21  9:00 AM   Result Value Ref Range    WBC 0-4 0 - 4 /hpf    RBC 0-5 0 - 5 /hpf    Bacteria Negative Negative /hpf    Mucus 4+ (A) Negative /lpf       Radiologic Studies -   [unfilled]  CT Results  (Last 48 hours)    None        CXR Results  (Last 48 hours)    None          Medical Decision Making and ED Course   - I am the first and primary provider for this patient AND AM THE PRIMARY PROVIDER OF RECORD. - I reviewed the vital signs, available nursing notes, past medical history, past surgical history, family history and social history. - Initial assessment performed. The patients presenting problems have been discussed, and the staff are in agreement with the care plan formulated and outlined with them. I have encouraged them to ask questions as they arise throughout their visit. Vital Signs-Reviewed the patient's vital signs.     Patient Vitals for the past 12 hrs:   Temp Pulse Resp BP SpO2   02/11/21 0852 98.8 °F (37.1 °C) 77 16 135/85 100 %       The patient presents with abdominal pain with a differential diagnosis of abdominal pain, gastritis, gastroenteritis, obstruction, pregnancy, UTI and vomiting    ED Course:              Provider Notes (Medical Decision Making):   UA negative for UTI, pregnancy test negative WBC is within normal limits lipase normal electrolytes stable with normal kidney function. Patient reports symptoms improved status post Tylenol p.o. and IV Zofran. Requesting to be discharged pending CT of abdomen. She reports will follow up with her GI surgeon Dr. Emma Millan and schedule appointment for CT scan if needed at that time. She reports pain is mostly right-sided denies any complaints with bowel movements nausea has since resolved she reports being able to tolerate fruit cup at home advised patient was attempted to rule out small bowel obstruction and risk involved. Patient verbalized understanding reports needs to go to  her son currently stable at time of discharge. [place] discharge given. Consultations:       Consultations:         Procedures and Critical Care       Performed by: GARY Hernadez NP        Disposition     Disposition:     Discharged      DISCHARGE PLAN:  1. Current Discharge Medication List      CONTINUE these medications which have NOT CHANGED    Details   calcium carbonate (MYLANTA) 400 mg chew Take 1 Tab by mouth three (3) times daily (with meals). Calcium-Cholecalciferol, D3, 500 mg(1,250mg) -400 unit tab Take  by mouth.      cyanocobalamin (Vitamin B-12) 1,000 mcg tablet Take 1,000 mcg by mouth daily. multivitamin (ONE A DAY) tablet Take 1 Tab by mouth daily. ursodioL (ACTIGALL) 300 mg capsule Take 300 mg by mouth two (2) times a day.            2.   Follow-up Information     Follow up With Specialties Details Why Contact Info    Dr. Phil Castillo an appointment as soon as possible for a visit in 1 week If symptoms worsen, As needed General surgery        3. Return to ED if worse   4. Current Discharge Medication List      START taking these medications    Details   ondansetron (Zofran ODT) 4 mg disintegrating tablet Take 1 Tab by mouth every eight (8) hours as needed for Nausea. Qty: 10 Tab, Refills: 0             Diagnosis     Clinical Impression:   1. Non-intractable vomiting with nausea, unspecified vomiting type    2. Right sided abdominal pain        Attestations:    Roseline Roldan NP    Please note that this dictation was completed with [employer], the computer voice recognition software. Quite often unanticipated grammatical, syntax, homophones, and other interpretive errors are inadvertently transcribed by the computer software. Please disregard these errors. Please excuse any errors that have escaped final proofreading. Thank you.

## 2021-02-11 NOTE — DISCHARGE INSTRUCTIONS
Thank you! Thank you for allowing me to care for you in the emergency department. I sincerely hope that you are satisfied with your visit today. It is my goal to provide you with excellent care. Below you will find a list of your labs and imaging from your visit today. Should you have any questions regarding these results please do not hesitate to call the emergency department. Labs -     Recent Results (from the past 12 hour(s))   CBC WITH AUTOMATED DIFF    Collection Time: 02/11/21  9:00 AM   Result Value Ref Range    WBC 4.5 3.6 - 11.0 K/uL    RBC 4.12 3.80 - 5.20 M/uL    HGB 12.8 11.5 - 16.0 g/dL    HCT 38.1 35.0 - 47.0 %    MCV 92.5 80.0 - 99.0 FL    MCH 31.1 26.0 - 34.0 PG    MCHC 33.6 30.0 - 36.5 g/dL    RDW 14.2 11.5 - 14.5 %    PLATELET 272 952 - 075 K/uL    MPV 9.7 8.9 - 12.9 FL    NEUTROPHILS 46 32 - 75 %    LYMPHOCYTES 42 12 - 49 %    MONOCYTES 7 5 - 13 %    EOSINOPHILS 5 0 - 7 %    BASOPHILS 0 0 - 1 %    IMMATURE GRANULOCYTES 0 0.0 - 0.5 %    ABS. NEUTROPHILS 2.1 1.8 - 8.0 K/UL    ABS. LYMPHOCYTES 1.9 0.8 - 3.5 K/UL    ABS. MONOCYTES 0.3 0.0 - 1.0 K/UL    ABS. EOSINOPHILS 0.2 0.0 - 0.4 K/UL    ABS. BASOPHILS 0.0 0.0 - 0.1 K/UL    ABS. IMM.  GRANS. 0.0 0.00 - 0.04 K/UL    DF AUTOMATED     METABOLIC PANEL, BASIC    Collection Time: 02/11/21  9:00 AM   Result Value Ref Range    Sodium 140 136 - 145 mmol/L    Potassium 3.8 3.5 - 5.1 mmol/L    Chloride 109 (H) 97 - 108 mmol/L    CO2 27 21 - 32 mmol/L    Anion gap 4 (L) 5 - 15 mmol/L    Glucose 97 65 - 100 mg/dL    BUN 15 6 - 20 mg/dL    Creatinine 0.75 0.55 - 1.02 mg/dL    BUN/Creatinine ratio 20 12 - 20      GFR est AA >60 >60 ml/min/1.73m2    GFR est non-AA >60 >60 ml/min/1.73m2    Calcium 9.3 8.5 - 10.1 mg/dL   LIPASE    Collection Time: 02/11/21  9:00 AM   Result Value Ref Range    Lipase 112 73 - 393 U/L   URINALYSIS W/ RFLX MICROSCOPIC    Collection Time: 02/11/21  9:00 AM   Result Value Ref Range    Color Yellow/Straw      Appearance Clear Clear      Specific gravity >1.030 (H) 1.003 - 1.030    pH (UA) 5.0 5.0 - 8.0      Protein 30 (A) Negative mg/dL    Glucose Negative Negative mg/dL    Ketone 5 (A) Negative mg/dL    Bilirubin Negative Negative      Blood Negative Negative      Urobilinogen 4.0 (H) 0.1 - 1.0 EU/dL    Nitrites Negative Negative      Leukocyte Esterase Negative Negative      WBC 0-5 0 - 4 /hpf    RBC 0-5 0 - 5 /hpf    Bacteria Negative Negative /hpf    Mucus 4+ /lpf   HCG URINE, QL    Collection Time: 02/11/21  9:00 AM   Result Value Ref Range    HCG urine, QL Negative Negative     URINE MICROSCOPIC    Collection Time: 02/11/21  9:00 AM   Result Value Ref Range    WBC 0-4 0 - 4 /hpf    RBC 0-5 0 - 5 /hpf    Bacteria Negative Negative /hpf    Mucus 4+ (A) Negative /lpf       Radiologic Studies -   CT ABD PELV W CONT    (Results Pending)     CT Results  (Last 48 hours)      None          CXR Results  (Last 48 hours)      None               If you feel that you have not received excellent quality care or timely care, please ask to speak to the nurse manager. Please choose us in the future for your continued health care needs. ------------------------------------------------------------------------------------------------------------  The exam and treatment you received in the Emergency Department were for an urgent problem and are not intended as complete care. It is important that you follow-up with a doctor, nurse practitioner, or physician assistant to:  (1) confirm your diagnosis,  (2) re-evaluation of changes in your illness and treatment, and  (3) for ongoing care. If your symptoms become worse or you do not improve as expected and you are unable to reach your usual health care provider, you should return to the Emergency Department. We are available 24 hours a day. Please take your discharge instructions with you when you go to your follow-up appointment.      If you have any problem arranging a follow-up appointment, contact the Emergency Department immediately. If a prescription has been provided, please have it filled as soon as possible to prevent a delay in treatment. Read the entire medication instruction sheet provided to you by the pharmacy. If you have any questions or reservations about taking the medication due to side effects or interactions with other medications, please call your primary care physician or contact the ER to speak with the charge nurse. Make an appointment with your family doctor or the physician you were referred to for follow-up of this visit as instructed on your discharge paperwork, as this is a mandatory follow-up. Return to the ER if you are unable to be seen or if you are unable to be seen in a timely manner. If you have any problem arranging the follow-up visit, contact the Emergency Department immediately.

## 2021-02-11 NOTE — TELEPHONE ENCOUNTER
Patient called on nurse line with nervousness that she is one week late for her cycle that she is always on time for. She had slight cramping last night and she expected her cycle to start. She said she took home UPT and came back negative. She is thinking that the UPT is too premature and would rather set up for blood test next week if she has not started. She is not on birth control. Is this okay?

## 2022-03-18 PROBLEM — Z34.00 SUPERVISION OF NORMAL FIRST PREGNANCY: Status: ACTIVE | Noted: 2017-08-07

## 2022-03-18 PROBLEM — R11.10 REGURGITATION OF FOOD: Status: ACTIVE | Noted: 2020-08-03

## 2022-03-18 PROBLEM — Z98.84 STATUS POST BARIATRIC SURGERY: Status: ACTIVE | Noted: 2020-08-03

## 2022-03-18 PROBLEM — Z34.90 PREGNANCY: Status: ACTIVE | Noted: 2017-09-04

## 2022-03-19 PROBLEM — R55 SYNCOPE: Status: ACTIVE | Noted: 2017-09-26

## 2022-03-19 PROBLEM — R56.9 CONVULSIONS (HCC): Status: ACTIVE | Noted: 2017-09-26

## 2022-03-19 PROBLEM — E66.01 OBESITY, MORBID (HCC): Status: ACTIVE | Noted: 2020-08-03

## 2022-03-19 PROBLEM — Z86.69 HISTORY OF MIGRAINE: Status: ACTIVE | Noted: 2017-09-26

## 2023-01-09 ENCOUNTER — HOSPITAL ENCOUNTER (EMERGENCY)
Age: 28
Discharge: HOME | End: 2023-01-10
Attending: STUDENT IN AN ORGANIZED HEALTH CARE EDUCATION/TRAINING PROGRAM
Payer: MEDICAID

## 2023-01-09 DIAGNOSIS — B34.9 VIRAL ILLNESS: Primary | ICD-10-CM
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
## 2023-01-09 PROCEDURE — 36415 COLL VENOUS BLD VENIPUNCTURE: CPT

## 2023-01-09 PROCEDURE — 74011250636 HC RX REV CODE- 250/636: Performed by: STUDENT IN AN ORGANIZED HEALTH CARE EDUCATION/TRAINING PROGRAM

## 2023-01-09 PROCEDURE — 99284 EMERGENCY DEPT VISIT MOD MDM: CPT

## 2023-01-09 PROCEDURE — 87635 SARS-COV-2 COVID-19 AMP PRB: CPT

## 2023-01-09 PROCEDURE — 96360 HYDRATION IV INFUSION INIT: CPT

## 2023-01-09 PROCEDURE — 80053 COMPREHEN METABOLIC PANEL: CPT

## 2023-01-09 PROCEDURE — 85025 COMPLETE CBC W/AUTO DIFF WBC: CPT

## 2023-01-09 RX ADMIN — SODIUM CHLORIDE 1000 ML: 9 INJECTION, SOLUTION INTRAVENOUS at 23:57

## 2023-01-09 NOTE — Clinical Note
[address]  [facility] EMERGENCY DEPT  [id number] [phone]    Work/School Note    Date: 1/9/2023    To Whom It May concern:    Bg Hunt was seen and treated today in the emergency room by the following provider(s):  Attending Provider: Cinda Crandall MD.      Bg Hunt is excused from work/school on 01/10/23 and 01/11/23. She is medically clear to return to work/school on 1/12/2023.        Sincerely,          Saud Cruz MD

## 2023-01-09 NOTE — Clinical Note
[address]  [facility] DEPT  [id number]  [phone]    Work/School Note    Date: 1/9/2023    To Whom It May concern:    Meri Currie was seen and treated today in the emergency room by the following provider(s):  Attending Provider: Latrell Farias MD.      Meri Currie is excused from work/school on 01/10/23 and 01/11/23. She is medically clear to return to work/school on 1/12/2023.        Sincerely,          Earnest Fields MD

## 2023-01-10 VITALS
TEMPERATURE: 98.2 F | HEART RATE: 87 BPM | BODY MASS INDEX: 26.58 KG/M2 | WEIGHT: 150 LBS | DIASTOLIC BLOOD PRESSURE: 85 MMHG | OXYGEN SATURATION: 100 % | SYSTOLIC BLOOD PRESSURE: 128 MMHG | RESPIRATION RATE: 18 BRPM | HEIGHT: 63 IN

## 2023-01-10 LAB
ALBUMIN SERPL-MCNC: 3.9 G/DL (ref 3.5–5.2)
ALBUMIN/GLOB SERPL: 1.2 (ref 1.1–2.2)
ALP SERPL-CCNC: 83 U/L (ref 35–104)
ALT SERPL-CCNC: 16 U/L (ref 10–35)
ANION GAP SERPL CALC-SCNC: 9 MMOL/L (ref 5–15)
AST SERPL-CCNC: 27 U/L (ref 10–35)
BASOPHILS # BLD: 0 K/UL (ref 0–1)
BASOPHILS NFR BLD: 0 % (ref 0–1)
BILIRUB SERPL-MCNC: 0.4 MG/DL (ref 0.2–1)
BUN SERPL-MCNC: 15 MG/DL (ref 6–20)
BUN/CREAT SERPL: 23 (ref 12–20)
CALCIUM SERPL-MCNC: 8.5 MG/DL (ref 8.6–10)
CHLORIDE SERPL-SCNC: 103 MMOL/L (ref 98–107)
CO2 SERPL-SCNC: 25 MMOL/L (ref 22–29)
COVID-19 RAPID TEST, COVR: NOT DETECTED
CREAT SERPL-MCNC: 0.65 MG/DL (ref 0.5–0.9)
DIFFERENTIAL METHOD BLD: ABNORMAL
EOSINOPHIL # BLD: 0.1 K/UL (ref 0–0.4)
EOSINOPHIL NFR BLD: 1 %
ERYTHROCYTE [DISTWIDTH] IN BLOOD BY AUTOMATED COUNT: 13.6 % (ref 11.5–14.5)
GLOBULIN SER CALC-MCNC: 3.2 G/DL (ref 2–4)
GLUCOSE SERPL-MCNC: 96 MG/DL (ref 65–100)
HCG UR QL: NEGATIVE
HCT VFR BLD AUTO: 35.3 % (ref 35–47)
HGB BLD-MCNC: 11.8 G/DL (ref 11.5–16)
IMM GRANULOCYTES # BLD AUTO: 0 K/UL (ref 0–0.04)
IMM GRANULOCYTES NFR BLD AUTO: 0 % (ref 0–0.5)
LYMPHOCYTES # BLD: 1.3 K/UL (ref 0.8–3.5)
LYMPHOCYTES NFR BLD: 25 % (ref 12–49)
MCH RBC QN AUTO: 30.4 PG (ref 26–34)
MCHC RBC AUTO-ENTMCNC: 33.4 G/DL (ref 30–36.5)
MCV RBC AUTO: 91 FL (ref 80–99)
MONOCYTES # BLD: 1 K/UL (ref 0–1)
MONOCYTES NFR BLD: 19 % (ref 5–13)
NEUTS SEG # BLD: 3 K/UL (ref 1.8–8)
NEUTS SEG NFR BLD: 55 % (ref 32–75)
NRBC # BLD: 0 K/UL (ref 0–0.01)
NRBC BLD-RTO: 0 PER 100 WBC
PLATELET # BLD AUTO: 219 K/UL (ref 150–400)
PMV BLD AUTO: 9.2 FL (ref 8.9–12.9)
POTASSIUM SERPL-SCNC: 4.7 MMOL/L (ref 3.5–5.1)
PROT SERPL-MCNC: 7.1 G/DL (ref 6.4–8.3)
RBC # BLD AUTO: 3.88 M/UL (ref 3.8–5.2)
SODIUM SERPL-SCNC: 137 MMOL/L (ref 136–145)
SOURCE, COVRS: NORMAL
WBC # BLD AUTO: 5.4 K/UL (ref 3.6–11)
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
## 2023-02-24 ENCOUNTER — HOSPITAL ENCOUNTER (EMERGENCY)
Age: 28
Discharge: HOME | End: 2023-02-25
Attending: EMERGENCY MEDICINE
Payer: COMMERCIAL

## 2023-02-24 DIAGNOSIS — F10.920 ALCOHOLIC INTOXICATION WITHOUT COMPLICATION (HCC): Primary | ICD-10-CM

## 2023-02-24 DIAGNOSIS — R11.2 NAUSEA AND VOMITING, UNSPECIFIED VOMITING TYPE: ICD-10-CM

## 2023-02-24 LAB
ALBUMIN SERPL-MCNC: 3.9 G/DL (ref 3.5–5.2)
ALBUMIN/GLOB SERPL: 1.2 (ref 1.1–2.2)
ALP SERPL-CCNC: 82 U/L (ref 35–104)
ALT SERPL-CCNC: 15 U/L (ref 10–35)
ANION GAP SERPL CALC-SCNC: 12 MMOL/L (ref 5–15)
AST SERPL-CCNC: 15 U/L (ref 10–35)
BASOPHILS # BLD: 0 K/UL (ref 0–1)
BASOPHILS NFR BLD: 0 % (ref 0–1)
BILIRUB SERPL-MCNC: 0.4 MG/DL (ref 0.2–1)
BUN SERPL-MCNC: 15 MG/DL (ref 6–20)
BUN/CREAT SERPL: 24 (ref 12–20)
CALCIUM SERPL-MCNC: 8.7 MG/DL (ref 8.6–10)
CHLORIDE SERPL-SCNC: 108 MMOL/L (ref 98–107)
CO2 SERPL-SCNC: 24 MMOL/L (ref 22–29)
CREAT SERPL-MCNC: 0.63 MG/DL (ref 0.5–0.9)
DIFFERENTIAL METHOD BLD: ABNORMAL
EOSINOPHIL # BLD: 0.2 K/UL (ref 0–0.4)
EOSINOPHIL NFR BLD: 3 %
ERYTHROCYTE [DISTWIDTH] IN BLOOD BY AUTOMATED COUNT: 13.4 % (ref 11.5–14.5)
GLOBULIN SER CALC-MCNC: 3.3 G/DL (ref 2–4)
GLUCOSE BLD STRIP.AUTO-MCNC: 89 MG/DL (ref 65–117)
GLUCOSE SERPL-MCNC: 86 MG/DL (ref 65–100)
HCG SERPL-ACNC: <1 MIU/ML
HCT VFR BLD AUTO: 34.3 % (ref 35–47)
HGB BLD-MCNC: 11.4 G/DL (ref 11.5–16)
IMM GRANULOCYTES # BLD AUTO: 0 K/UL (ref 0–0.04)
IMM GRANULOCYTES NFR BLD AUTO: 0 % (ref 0–0.5)
LYMPHOCYTES # BLD: 3.3 K/UL (ref 0.8–3.5)
LYMPHOCYTES NFR BLD: 43 % (ref 12–49)
MCH RBC QN AUTO: 30.7 PG (ref 26–34)
MCHC RBC AUTO-ENTMCNC: 33.2 G/DL (ref 30–36.5)
MCV RBC AUTO: 92.5 FL (ref 80–99)
MONOCYTES # BLD: 0.8 K/UL (ref 0–1)
MONOCYTES NFR BLD: 11 % (ref 5–13)
NEUTS SEG # BLD: 3.3 K/UL (ref 1.8–8)
NEUTS SEG NFR BLD: 43 % (ref 32–75)
NRBC # BLD: 0 K/UL (ref 0–0.01)
NRBC BLD-RTO: 0 PER 100 WBC
PLATELET # BLD AUTO: 275 K/UL (ref 150–400)
PMV BLD AUTO: 8.8 FL (ref 8.9–12.9)
POTASSIUM SERPL-SCNC: 3.6 MMOL/L (ref 3.5–5.1)
PROT SERPL-MCNC: 7.2 G/DL (ref 6.4–8.3)
RBC # BLD AUTO: 3.71 M/UL (ref 3.8–5.2)
SERVICE CMNT-IMP: NORMAL
SODIUM SERPL-SCNC: 144 MMOL/L (ref 136–145)
WBC # BLD AUTO: 7.6 K/UL (ref 3.6–11)

## 2023-02-24 PROCEDURE — 96374 THER/PROPH/DIAG INJ IV PUSH: CPT

## 2023-02-24 PROCEDURE — 96376 TX/PRO/DX INJ SAME DRUG ADON: CPT

## 2023-02-24 PROCEDURE — 96375 TX/PRO/DX INJ NEW DRUG ADDON: CPT

## 2023-02-24 PROCEDURE — 84702 CHORIONIC GONADOTROPIN TEST: CPT

## 2023-02-24 PROCEDURE — 96361 HYDRATE IV INFUSION ADD-ON: CPT

## 2023-02-24 PROCEDURE — 99284 EMERGENCY DEPT VISIT MOD MDM: CPT

## 2023-02-24 PROCEDURE — 85025 COMPLETE CBC W/AUTO DIFF WBC: CPT

## 2023-02-24 PROCEDURE — 74011250636 HC RX REV CODE- 250/636: Performed by: EMERGENCY MEDICINE

## 2023-02-24 PROCEDURE — 36415 COLL VENOUS BLD VENIPUNCTURE: CPT

## 2023-02-24 PROCEDURE — 80053 COMPREHEN METABOLIC PANEL: CPT

## 2023-02-24 RX ORDER — ONDANSETRON 2 MG/ML
8 INJECTION INTRAMUSCULAR; INTRAVENOUS
Status: COMPLETED | OUTPATIENT
Start: 2023-02-24 | End: 2023-02-25

## 2023-02-24 RX ORDER — METOCLOPRAMIDE HYDROCHLORIDE 5 MG/ML
10 INJECTION INTRAMUSCULAR; INTRAVENOUS ONCE
Status: COMPLETED | OUTPATIENT
Start: 2023-02-24 | End: 2023-02-24

## 2023-02-24 RX ORDER — ONDANSETRON 2 MG/ML
4 INJECTION INTRAMUSCULAR; INTRAVENOUS
Status: COMPLETED | OUTPATIENT
Start: 2023-02-24 | End: 2023-02-24

## 2023-02-24 RX ADMIN — SODIUM CHLORIDE 1000 ML: 9 INJECTION, SOLUTION INTRAVENOUS at 22:41

## 2023-02-24 RX ADMIN — ONDANSETRON 4 MG: 2 INJECTION INTRAMUSCULAR; INTRAVENOUS at 22:39

## 2023-02-24 RX ADMIN — METOCLOPRAMIDE 10 MG: 5 INJECTION, SOLUTION INTRAMUSCULAR; INTRAVENOUS at 23:09

## 2023-02-24 RX ADMIN — SODIUM CHLORIDE 1000 ML: 9 INJECTION, SOLUTION INTRAVENOUS at 23:09

## 2023-02-25 VITALS
SYSTOLIC BLOOD PRESSURE: 112 MMHG | DIASTOLIC BLOOD PRESSURE: 98 MMHG | WEIGHT: 150 LBS | OXYGEN SATURATION: 100 % | BODY MASS INDEX: 25.61 KG/M2 | TEMPERATURE: 97.2 F | RESPIRATION RATE: 24 BRPM | HEIGHT: 64 IN | HEART RATE: 75 BPM

## 2023-02-25 PROCEDURE — 74011250636 HC RX REV CODE- 250/636: Performed by: EMERGENCY MEDICINE

## 2023-02-25 PROCEDURE — 96374 THER/PROPH/DIAG INJ IV PUSH: CPT

## 2023-02-25 RX ORDER — ONDANSETRON 4 MG/1
4 TABLET, ORALLY DISINTEGRATING ORAL
Qty: 12 TABLET | Refills: 0 | Status: SHIPPED | OUTPATIENT
Start: 2023-02-25

## 2023-02-25 RX ADMIN — ONDANSETRON 8 MG: 2 INJECTION INTRAMUSCULAR; INTRAVENOUS at 00:03
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
## 2023-02-25 NOTE — ED TRIAGE NOTES
Patient states that she drank weed infused wine 1hr PTA. Patient states that she drank the whole bottle. Patient has never smoked wed before.  Patient states that her eyes feel \"weird\", patient is vomiting on arrival.

## 2023-02-25 NOTE — ED NOTES
Pt given mesh panties and gown due to incontinence while vomiting. Pt with dizziness and vomiting with position changes. purewick placed for urine collection due to dizziness and 2L NS infusing, with permission from pt. Pt medicated again per order.

## 2023-02-25 NOTE — ED NOTES
Introduced self to patient as primary RN. Pt A&Ox4. Patient connected to bedside monitor x2. Call bell within reach and bed in low position. CC: vomiting after drinking a bottle of \"weed infused wine\". Pt states she went to sleep after and woke with multiple episodes of vomiting. Pt reports this is her first time using \"weed\" and states she feels funny in her head.    Onset: 1 hour  PMH: gastric bypass  Other Info: n/a

## 2023-02-25 NOTE — ED NOTES
Pt reports feeling better and well enough to go home. No vomiting since last occurrence. Dr. Pranav malin.

## 2023-05-07 ENCOUNTER — HOSPITAL ENCOUNTER (EMERGENCY)
Facility: HOSPITAL | Age: 28
Discharge: HOME | End: 2023-05-08
Payer: COMMERCIAL

## 2023-05-07 ENCOUNTER — APPOINTMENT (OUTPATIENT)
Facility: HOSPITAL | Age: 28
End: 2023-05-07
Payer: COMMERCIAL

## 2023-05-07 DIAGNOSIS — O20.0 THREATENED MISCARRIAGE IN EARLY PREGNANCY: Primary | ICD-10-CM

## 2023-05-07 LAB
ALBUMIN SERPL-MCNC: 3.2 G/DL (ref 3.5–5)
ALBUMIN/GLOB SERPL: 0.8 (ref 1.1–2.2)
ALP SERPL-CCNC: 63 U/L (ref 45–117)
ALT SERPL-CCNC: 17 U/L (ref 12–78)
ANION GAP SERPL CALC-SCNC: 4 MMOL/L (ref 5–15)
APPEARANCE UR: ABNORMAL
AST SERPL W P-5'-P-CCNC: 10 U/L (ref 15–37)
BACTERIA URNS QL MICRO: NEGATIVE /HPF
BASOPHILS # BLD: 0 K/UL (ref 0–0.1)
BASOPHILS NFR BLD: 0 % (ref 0–1)
BILIRUB SERPL-MCNC: 0.4 MG/DL (ref 0.2–1)
BILIRUB UR QL: NEGATIVE
BUN SERPL-MCNC: 13 MG/DL (ref 6–20)
BUN/CREAT SERPL: 19 (ref 12–20)
CA-I BLD-MCNC: 8.4 MG/DL (ref 8.5–10.1)
CHLORIDE SERPL-SCNC: 106 MMOL/L (ref 97–108)
CO2 SERPL-SCNC: 24 MMOL/L (ref 21–32)
COLOR UR: ABNORMAL
CREAT SERPL-MCNC: 0.69 MG/DL (ref 0.55–1.02)
DIFFERENTIAL METHOD BLD: ABNORMAL
EOSINOPHIL # BLD: 0.3 K/UL (ref 0–0.4)
EOSINOPHIL NFR BLD: 5 % (ref 0–7)
EPITH CASTS URNS QL MICRO: ABNORMAL /LPF
ERYTHROCYTE [DISTWIDTH] IN BLOOD BY AUTOMATED COUNT: 13.6 % (ref 11.5–14.5)
GLOBULIN SER CALC-MCNC: 4 G/DL (ref 2–4)
GLUCOSE SERPL-MCNC: 92 MG/DL (ref 65–100)
GLUCOSE UR STRIP.AUTO-MCNC: NEGATIVE MG/DL
HCG SERPL-ACNC: ABNORMAL MIU/ML (ref 0–6)
HCG UR QL: POSITIVE
HCT VFR BLD AUTO: 31.8 % (ref 35–47)
HGB BLD-MCNC: 10.8 G/DL (ref 11.5–16)
HGB UR QL STRIP: ABNORMAL
IMM GRANULOCYTES # BLD AUTO: 0 K/UL (ref 0–0.04)
IMM GRANULOCYTES NFR BLD AUTO: 0 % (ref 0–0.5)
KETONES UR QL STRIP.AUTO: 5 MG/DL
LEUKOCYTE ESTERASE UR QL STRIP.AUTO: ABNORMAL
LYMPHOCYTES # BLD: 2.6 K/UL (ref 0.8–3.5)
LYMPHOCYTES NFR BLD: 44 % (ref 12–49)
MCH RBC QN AUTO: 30.6 PG (ref 26–34)
MCHC RBC AUTO-ENTMCNC: 34 G/DL (ref 30–36.5)
MCV RBC AUTO: 90.1 FL (ref 80–99)
MONOCYTES # BLD: 0.5 K/UL (ref 0–1)
MONOCYTES NFR BLD: 8 % (ref 5–13)
NEUTS SEG # BLD: 2.5 K/UL (ref 1.8–8)
NEUTS SEG NFR BLD: 43 % (ref 32–75)
NITRITE UR QL STRIP.AUTO: NEGATIVE
NRBC # BLD: 0 K/UL (ref 0–0.01)
NRBC BLD-RTO: 0 PER 100 WBC
PH UR STRIP: 5 (ref 5–8)
PLATELET # BLD AUTO: 225 K/UL (ref 150–400)
PMV BLD AUTO: 8.8 FL (ref 8.9–12.9)
POTASSIUM SERPL-SCNC: 4 MMOL/L (ref 3.5–5.1)
PROT SERPL-MCNC: 7.2 G/DL (ref 6.4–8.2)
PROT UR STRIP-MCNC: 30 MG/DL
RBC # BLD AUTO: 3.53 M/UL (ref 3.8–5.2)
RBC #/AREA URNS HPF: ABNORMAL /HPF (ref 0–5)
SODIUM SERPL-SCNC: 134 MMOL/L (ref 136–145)
SP GR UR REFRACTOMETRY: >1.03 (ref 1–1.03)
URINE CULTURE IF INDICATED: ABNORMAL
UROBILINOGEN UR QL STRIP.AUTO: 2 EU/DL (ref 0.1–1)
WBC # BLD AUTO: 5.9 K/UL (ref 3.6–11)
WBC URNS QL MICRO: ABNORMAL /HPF (ref 0–4)

## 2023-05-07 PROCEDURE — 85025 COMPLETE CBC W/AUTO DIFF WBC: CPT

## 2023-05-07 PROCEDURE — 99284 EMERGENCY DEPT VISIT MOD MDM: CPT

## 2023-05-07 PROCEDURE — 81025 URINE PREGNANCY TEST: CPT

## 2023-05-07 PROCEDURE — 81001 URINALYSIS AUTO W/SCOPE: CPT

## 2023-05-07 PROCEDURE — 80053 COMPREHEN METABOLIC PANEL: CPT

## 2023-05-07 PROCEDURE — 76801 OB US < 14 WKS SINGLE FETUS: CPT

## 2023-05-07 PROCEDURE — 76817 TRANSVAGINAL US OBSTETRIC: CPT

## 2023-05-07 PROCEDURE — 84702 CHORIONIC GONADOTROPIN TEST: CPT

## 2023-05-07 ASSESSMENT — PAIN DESCRIPTION - DESCRIPTORS: DESCRIPTORS: PRESSURE

## 2023-05-07 ASSESSMENT — PAIN DESCRIPTION - LOCATION: LOCATION: ABDOMEN

## 2023-05-07 ASSESSMENT — PAIN DESCRIPTION - ORIENTATION: ORIENTATION: LOWER

## 2023-05-07 ASSESSMENT — PAIN SCALES - GENERAL: PAINLEVEL_OUTOF10: 3

## 2023-05-08 VITALS
HEART RATE: 72 BPM | RESPIRATION RATE: 20 BRPM | HEIGHT: 63 IN | OXYGEN SATURATION: 100 % | WEIGHT: 150 LBS | TEMPERATURE: 98.1 F | SYSTOLIC BLOOD PRESSURE: 110 MMHG | BODY MASS INDEX: 26.58 KG/M2 | DIASTOLIC BLOOD PRESSURE: 68 MMHG

## 2023-05-08 ASSESSMENT — PAIN - FUNCTIONAL ASSESSMENT: PAIN_FUNCTIONAL_ASSESSMENT: NONE - DENIES PAIN

## 2023-05-08 ASSESSMENT — PAIN SCALES - GENERAL: PAINLEVEL_OUTOF10: 0

## 2023-05-08 NOTE — ED PROVIDER NOTES
[facility] EMERGENCY DEPT  EMERGENCY DEPARTMENT HISTORY AND PHYSICAL EXAM    Date: 2023  Patient Name: Avis Stark  MRN: [id number]  [place]: 1995  Date of evaluation: 2023  Provider: Celina Ornelas PA-C   Note Started: 12:02 AM 23    HISTORY OF PRESENT ILLNESS     Chief Complaint   Patient presents with    Pregnancy Problem       History Provided By: Patient    HPI: Avis Stark is a 33 y.o. female with history of anxiety, migraine, presents for vaginal bleeding and positive pregnancy test x1 hour. Patient is  and OBGYN is [facility], she does have an appointment tomorrow. She is approximately 8 weeks pregnant. States that she went to the bathroom and was noted to have one clot of blood with some surrounding discharge. Since then has had some light pink discharge and mild bleeding. She has been having some lower abdominal cramping throughout her whole pregnancy, denies any worsening of that today. Denies any fevers, chills, dysuria, shortness of breath, difficulty breathing, nausea/vomiting, constipation/diarrhea, abdominal pain, rash, night sweats, or chest pain.      PAST MEDICAL HISTORY   Past Medical History:  Past Medical History:   Diagnosis Date    Anxiety     Back pain     Depression     History of migraine 2017    Hypertension in pregnancy, preeclampsia, severe, postpartum condition 2017    Migraine     Morbid obesity ([facility] [address] 03/10/2020    s/p RYGB     Numbness and tingling in both hands     Sleep apnea     Syncope 2017    Trauma 2014    fell and dislocated r shoulder       Past Surgical History:  Past Surgical History:   Procedure Laterality Date    GASTRIC BYPASS,OBESE<150CM JANICE-EN-Y  2020       Family History:  Family History   Problem Relation Age of Onset    Hypertension Sister     Diabetes Father     Diabetes Sister     Diabetes Mother        Social History:  Social History     Tobacco Use    Smoking status: Never    Smokeless

## 2023-05-08 NOTE — ED NOTES
Xiao Loop for ultrasound via [employer], no answer, left voicemail      Junior Akbariff  05/07/23 2141      LILLY Ballard returned called, US tech enroute       Junior Santos  05/07/23 2142

## 2023-05-08 NOTE — DISCHARGE INSTRUCTIONS
nurse practitioner, or physician assistant to:  (1) confirm your diagnosis,  (2) re-evaluation of changes in your illness and treatment, and  (3) for ongoing care. Please take your discharge instructions with you when you go to your follow-up appointment. If you have any problem arranging a follow-up appointment, contact the Emergency Department. If your symptoms become worse or you do not improve as expected and you are unable to reach your health care provider, please return to the Emergency Department. We are available 24 hours a day. If a prescription has been provided, please have it filled as soon as possible to prevent a delay in treatment. If you have any questions or reservations about taking the medication due to side effects or interactions with other medications, please call your primary care provider or contact the ER.

## 2023-05-08 NOTE — ED TRIAGE NOTES
Pt states she noticed some discharge and small amount of blood when she went to the bathroom one hour ago. Pt states she is 8 weeks pregnant.

## 2023-06-20 LAB
ABO, EXTERNAL RESULT: NORMAL
C. TRACHOMATIS, EXTERNAL RESULT: NEGATIVE
HEP B, EXTERNAL RESULT: NEGATIVE
HIV, EXTERNAL RESULT: NEGATIVE
N. GONORRHOEAE, EXTERNAL RESULT: NEGATIVE
RH FACTOR, EXTERNAL RESULT: POSITIVE
RPR, EXTERNAL RESULT: NONREACTIVE
RUBELLA TITER, EXTERNAL RESULT: NORMAL

## 2023-06-28 ENCOUNTER — HOSPITAL ENCOUNTER (EMERGENCY)
Facility: HOSPITAL | Age: 28
Discharge: HOME | End: 2023-06-28
Attending: STUDENT IN AN ORGANIZED HEALTH CARE EDUCATION/TRAINING PROGRAM
Payer: COMMERCIAL

## 2023-06-28 VITALS
HEART RATE: 93 BPM | WEIGHT: 116 LBS | DIASTOLIC BLOOD PRESSURE: 73 MMHG | RESPIRATION RATE: 16 BRPM | HEIGHT: 64 IN | BODY MASS INDEX: 19.81 KG/M2 | SYSTOLIC BLOOD PRESSURE: 128 MMHG | TEMPERATURE: 97.9 F | OXYGEN SATURATION: 100 %

## 2023-06-28 DIAGNOSIS — R30.0 DYSURIA: Primary | ICD-10-CM

## 2023-06-28 LAB
APPEARANCE UR: ABNORMAL
BACTERIA URNS QL MICRO: NEGATIVE /HPF
BILIRUB UR QL: NEGATIVE
COLOR UR: ABNORMAL
EPITH CASTS URNS QL MICRO: ABNORMAL /LPF
GLUCOSE UR STRIP.AUTO-MCNC: NEGATIVE MG/DL
HGB UR QL STRIP: NEGATIVE
KETONES UR QL STRIP.AUTO: NEGATIVE MG/DL
LEUKOCYTE ESTERASE UR QL STRIP.AUTO: ABNORMAL
MUCOUS THREADS URNS QL MICRO: ABNORMAL /LPF
NITRITE UR QL STRIP.AUTO: NEGATIVE
PH UR STRIP: 6 (ref 5–8)
PROT UR STRIP-MCNC: NEGATIVE MG/DL
RBC #/AREA URNS HPF: ABNORMAL /HPF (ref 0–5)
SP GR UR REFRACTOMETRY: >1.03 (ref 1–1.03)
URINE CULTURE IF INDICATED: ABNORMAL
UROBILINOGEN UR QL STRIP.AUTO: 2 EU/DL (ref 0.1–1)
WBC URNS QL MICRO: ABNORMAL /HPF (ref 0–4)

## 2023-06-28 PROCEDURE — 81001 URINALYSIS AUTO W/SCOPE: CPT

## 2023-06-28 PROCEDURE — 99283 EMERGENCY DEPT VISIT LOW MDM: CPT

## 2023-06-28 RX ORDER — PHENAZOPYRIDINE HYDROCHLORIDE 95 MG/1
95 TABLET ORAL 3 TIMES DAILY PRN
Qty: 9 TABLET | Refills: 0 | Status: SHIPPED | OUTPATIENT
Start: 2023-06-28 | End: 2023-07-01

## 2023-06-28 ASSESSMENT — PAIN DESCRIPTION - DESCRIPTORS: DESCRIPTORS: ACHING;BURNING

## 2023-06-28 ASSESSMENT — PAIN SCALES - GENERAL
PAINLEVEL_OUTOF10: 7
PAINLEVEL_OUTOF10: 7

## 2023-06-28 ASSESSMENT — LIFESTYLE VARIABLES
HOW OFTEN DO YOU HAVE A DRINK CONTAINING ALCOHOL: NEVER
HOW MANY STANDARD DRINKS CONTAINING ALCOHOL DO YOU HAVE ON A TYPICAL DAY: PATIENT DOES NOT DRINK

## 2023-06-28 ASSESSMENT — PAIN DESCRIPTION - ORIENTATION
ORIENTATION: RIGHT
ORIENTATION: RIGHT

## 2023-06-28 ASSESSMENT — PAIN DESCRIPTION - LOCATION
LOCATION: ABDOMEN;RIB CAGE
LOCATION: FLANK

## 2023-06-28 ASSESSMENT — PAIN DESCRIPTION - PAIN TYPE: TYPE: ACUTE PAIN

## 2023-11-01 ENCOUNTER — ROUTINE PRENATAL (OUTPATIENT)
Age: 28
End: 2023-11-01
Payer: COMMERCIAL

## 2023-11-01 VITALS — DIASTOLIC BLOOD PRESSURE: 75 MMHG | SYSTOLIC BLOOD PRESSURE: 116 MMHG | HEART RATE: 118 BPM

## 2023-11-01 DIAGNOSIS — Z87.59 HISTORY OF PRE-ECLAMPSIA: ICD-10-CM

## 2023-11-01 DIAGNOSIS — Z03.74 ENCOUNTER FOR SUSPECTED PROBLEM WITH FETAL GROWTH RULED OUT: ICD-10-CM

## 2023-11-01 DIAGNOSIS — O36.5990 POOR FETAL GROWTH AFFECTING MANAGEMENT OF MOTHER, ANTEPARTUM, SINGLE OR UNSPECIFIED FETUS: Primary | ICD-10-CM

## 2023-11-01 DIAGNOSIS — T39.391A ALLERGIC REACTION TO NONSTEROIDAL ANTI-INFLAMMATORY DRUG (NSAID): ICD-10-CM

## 2023-11-01 DIAGNOSIS — Z98.84 STATUS POST BARIATRIC SURGERY: ICD-10-CM

## 2023-11-01 PROCEDURE — 76811 OB US DETAILED SNGL FETUS: CPT | Performed by: OBSTETRICS & GYNECOLOGY

## 2023-11-01 PROCEDURE — 99204 OFFICE O/P NEW MOD 45 MIN: CPT | Performed by: OBSTETRICS & GYNECOLOGY

## 2023-11-01 RX ORDER — FERROUS SULFATE 325(65) MG
1 TABLET ORAL DAILY
COMMUNITY
Start: 2023-09-25

## 2023-11-01 NOTE — PROCEDURES
PATIENT: Grant Abrams   -  : 1995   -  DOS:2023   -  INTERPRETING PROVIDER:Yasmine Blanco,   Indication  ========    Intrauterine growth restriction    Method  ======    Transabdominal ultrasound examination. View: Suboptimal view: limited by late gestational age    Dating  ======    LMP on: 3/9/2023  GA by LMP 35 w + 6 d  RIVAS by LMP: 2023  GA by prior assessment 29 w + 5 d  RIVAS by prior assessment: 2023  Previous Ultrasound on: 2023  Type of prior assessment: GA  GA at prior assessment date 8 w + 0 d  GA by previous U/S 35 w + 4 d  RIVAS by previous Ultrasound: 2023  Ultrasound examination on: 2023  GA by U/S based upon: AC, BPD, Femur, HC  GA by U/S 35 w + 3 d  RIVAS by U/S: 2023  Assigned: based on ultrasound (GA), selected on 2023  Assigned GA 33 w + 4 d  Assigned RIVAS: 2023    Fetal Growth Overview  =================    Exam date        GA              BPD (mm)        HC (mm)             AC (mm)              FL (mm)             HL (mm)             EFW (g)  2023          33w 4d        86    77%          295.4    4%        288.9    32%         64.6    33%        55.8    29%         2132    31%    Fetal Biometry  ============    Standard  BPD 86.0 mm 34w 5d 77% Hadlock  .0 mm 33w 0d 39% Oni  .4 mm 32w 5d 4% Hadlock  .9 mm 32w 6d 32% Hadlock  Femur 64.6 mm 33w 2d 33% Hadlock  Humerus 55.8 mm 32w 3d 29% Oni  EFW 2,132 g 32w 6d 31% Hadlock  EFW (lb) 4 lb  EFW (oz) 11 oz  EFW by: Hadlock (BPD-HC-AC-FL)  Extended   8.3 mm  Other Structures   bpm    General Evaluation  ==============    Cardiac activity present.  bpm. Fetal movements: visualized. Presentation: Cephalic  Placenta: Placental site: posterior  Umbilical cord: Cord vessels: 3 vessel cord. Insertion site: NOT VISUALIZED  Amniotic fluid: Amount of AF: normal. MVP 4.1 cm. AXEL 13.0 cm.  Q1 4.1 cm, Q2 3.3 cm, Q3 3.4 cm, Q4 2.2 cm    Fetal

## 2023-11-13 LAB — GBS, EXTERNAL RESULT: POSITIVE

## 2023-11-23 ENCOUNTER — ANESTHESIA EVENT (OUTPATIENT)
Facility: HOSPITAL | Age: 28
End: 2023-11-23
Payer: COMMERCIAL

## 2023-11-23 ENCOUNTER — HOSPITAL ENCOUNTER (OUTPATIENT)
Facility: HOSPITAL | Age: 28
Setting detail: OBSERVATION
Discharge: HOME | DRG: 560 | End: 2023-11-23
Attending: OBSTETRICS & GYNECOLOGY | Admitting: OBSTETRICS & GYNECOLOGY
Payer: COMMERCIAL

## 2023-11-23 ENCOUNTER — ANESTHESIA (OUTPATIENT)
Facility: HOSPITAL | Age: 28
End: 2023-11-23
Payer: COMMERCIAL

## 2023-11-23 ENCOUNTER — HOSPITAL ENCOUNTER (INPATIENT)
Facility: HOSPITAL | Age: 28
LOS: 2 days | Discharge: HOME | DRG: 560 | End: 2023-11-25
Attending: OBSTETRICS & GYNECOLOGY | Admitting: ADVANCED PRACTICE MIDWIFE
Payer: COMMERCIAL

## 2023-11-23 VITALS — DIASTOLIC BLOOD PRESSURE: 80 MMHG | HEART RATE: 67 BPM | SYSTOLIC BLOOD PRESSURE: 127 MMHG

## 2023-11-23 PROBLEM — O47.1 FALSE LABOR AFTER 37 COMPLETED WEEKS OF GESTATION: Status: ACTIVE | Noted: 2023-11-23

## 2023-11-23 PROBLEM — Z37.9 NORMAL LABOR: Status: ACTIVE | Noted: 2023-11-23

## 2023-11-23 LAB
ABO + RH BLD: NORMAL
ALBUMIN SERPL-MCNC: 2.4 G/DL (ref 3.5–5)
ALBUMIN/GLOB SERPL: 0.6 (ref 1.1–2.2)
ALP SERPL-CCNC: 176 U/L (ref 45–117)
ALT SERPL-CCNC: 13 U/L (ref 12–78)
ANION GAP SERPL CALC-SCNC: 4 MMOL/L (ref 5–15)
APPEARANCE UR: CLEAR
AST SERPL-CCNC: 12 U/L (ref 15–37)
BACTERIA URNS QL MICRO: ABNORMAL /HPF
BASOPHILS # BLD: 0 K/UL (ref 0–0.1)
BASOPHILS NFR BLD: 0 % (ref 0–1)
BILIRUB SERPL-MCNC: 0.6 MG/DL (ref 0.2–1)
BILIRUB UR QL: NEGATIVE
BLOOD GROUP ANTIBODIES SERPL: NORMAL
BUN SERPL-MCNC: 9 MG/DL (ref 6–20)
BUN/CREAT SERPL: 16 (ref 12–20)
CALCIUM SERPL-MCNC: 7.9 MG/DL (ref 8.5–10.1)
CHLORIDE SERPL-SCNC: 110 MMOL/L (ref 97–108)
CO2 SERPL-SCNC: 23 MMOL/L (ref 21–32)
COLOR UR: ABNORMAL
CREAT SERPL-MCNC: 0.57 MG/DL (ref 0.55–1.02)
CREAT UR-MCNC: 155 MG/DL
DIFFERENTIAL METHOD BLD: ABNORMAL
EOSINOPHIL # BLD: 0.1 K/UL (ref 0–0.4)
EOSINOPHIL NFR BLD: 2 % (ref 0–7)
EPITH CASTS URNS QL MICRO: ABNORMAL /LPF
ERYTHROCYTE [DISTWIDTH] IN BLOOD BY AUTOMATED COUNT: 14.6 % (ref 11.5–14.5)
GLOBULIN SER CALC-MCNC: 4 G/DL (ref 2–4)
GLUCOSE SERPL-MCNC: 77 MG/DL (ref 65–100)
GLUCOSE UR STRIP.AUTO-MCNC: NEGATIVE MG/DL
HCT VFR BLD AUTO: 28.3 % (ref 35–47)
HGB BLD-MCNC: 9.3 G/DL (ref 11.5–16)
HGB UR QL STRIP: NEGATIVE
HYALINE CASTS URNS QL MICRO: ABNORMAL /LPF (ref 0–5)
IMM GRANULOCYTES # BLD AUTO: 0 K/UL (ref 0–0.04)
IMM GRANULOCYTES NFR BLD AUTO: 0 % (ref 0–0.5)
KETONES UR QL STRIP.AUTO: NEGATIVE MG/DL
LEUKOCYTE ESTERASE UR QL STRIP.AUTO: ABNORMAL
LYMPHOCYTES # BLD: 2.1 K/UL (ref 0.8–3.5)
LYMPHOCYTES NFR BLD: 32 % (ref 12–49)
MCH RBC QN AUTO: 27 PG (ref 26–34)
MCHC RBC AUTO-ENTMCNC: 32.9 G/DL (ref 30–36.5)
MCV RBC AUTO: 82.3 FL (ref 80–99)
MONOCYTES # BLD: 0.5 K/UL (ref 0–1)
MONOCYTES NFR BLD: 8 % (ref 5–13)
NEUTS SEG # BLD: 3.8 K/UL (ref 1.8–8)
NEUTS SEG NFR BLD: 58 % (ref 32–75)
NITRITE UR QL STRIP.AUTO: NEGATIVE
NRBC # BLD: 0 K/UL (ref 0–0.01)
NRBC BLD-RTO: 0 PER 100 WBC
PH UR STRIP: 7 (ref 5–8)
PLATELET # BLD AUTO: 304 K/UL (ref 150–400)
PMV BLD AUTO: 9.3 FL (ref 8.9–12.9)
POTASSIUM SERPL-SCNC: 4.1 MMOL/L (ref 3.5–5.1)
PROT SERPL-MCNC: 6.4 G/DL (ref 6.4–8.2)
PROT UR STRIP-MCNC: NEGATIVE MG/DL
PROT UR-MCNC: 20 MG/DL (ref 0–11.9)
PROT/CREAT UR-RTO: 0.1
RBC # BLD AUTO: 3.44 M/UL (ref 3.8–5.2)
RBC #/AREA URNS HPF: ABNORMAL /HPF (ref 0–5)
SODIUM SERPL-SCNC: 137 MMOL/L (ref 136–145)
SP GR UR REFRACTOMETRY: 1.02 (ref 1–1.03)
SPECIMEN EXP DATE BLD: NORMAL
UROBILINOGEN UR QL STRIP.AUTO: 1 EU/DL (ref 0.2–1)
WBC # BLD AUTO: 6.5 K/UL (ref 3.6–11)
WBC URNS QL MICRO: ABNORMAL /HPF (ref 0–4)

## 2023-11-23 PROCEDURE — 80053 COMPREHEN METABOLIC PANEL: CPT

## 2023-11-23 PROCEDURE — 82570 ASSAY OF URINE CREATININE: CPT

## 2023-11-23 PROCEDURE — G0378 HOSPITAL OBSERVATION PER HR: HCPCS

## 2023-11-23 PROCEDURE — 7220000101 HC DELIVERY VAGINAL/SINGLE

## 2023-11-23 PROCEDURE — 36415 COLL VENOUS BLD VENIPUNCTURE: CPT

## 2023-11-23 PROCEDURE — 1120000000 HC RM PRIVATE OB

## 2023-11-23 PROCEDURE — 84156 ASSAY OF PROTEIN URINE: CPT

## 2023-11-23 PROCEDURE — 4A1HXCZ MONITORING OF PRODUCTS OF CONCEPTION, CARDIAC RATE, EXTERNAL APPROACH: ICD-10-PCS | Performed by: STUDENT IN AN ORGANIZED HEALTH CARE EDUCATION/TRAINING PROGRAM

## 2023-11-23 PROCEDURE — 7210000100 HC LABOR FEE PER 1 HR

## 2023-11-23 PROCEDURE — 59025 FETAL NON-STRESS TEST: CPT

## 2023-11-23 PROCEDURE — 86850 RBC ANTIBODY SCREEN: CPT

## 2023-11-23 PROCEDURE — 2580000003 HC RX 258: Performed by: ADVANCED PRACTICE MIDWIFE

## 2023-11-23 PROCEDURE — 2500000003 HC RX 250 WO HCPCS: Performed by: ANESTHESIOLOGY

## 2023-11-23 PROCEDURE — 86900 BLOOD TYPING SEROLOGIC ABO: CPT

## 2023-11-23 PROCEDURE — 00HU33Z INSERTION OF INFUSION DEVICE INTO SPINAL CANAL, PERCUTANEOUS APPROACH: ICD-10-PCS | Performed by: STUDENT IN AN ORGANIZED HEALTH CARE EDUCATION/TRAINING PROGRAM

## 2023-11-23 PROCEDURE — 6360000002 HC RX W HCPCS: Performed by: ADVANCED PRACTICE MIDWIFE

## 2023-11-23 PROCEDURE — 81001 URINALYSIS AUTO W/SCOPE: CPT

## 2023-11-23 PROCEDURE — 3700000156 HC EPIDURAL ANESTHESIA: Performed by: ANESTHESIOLOGY

## 2023-11-23 PROCEDURE — 86901 BLOOD TYPING SEROLOGIC RH(D): CPT

## 2023-11-23 PROCEDURE — 6370000000 HC RX 637 (ALT 250 FOR IP): Performed by: ADVANCED PRACTICE MIDWIFE

## 2023-11-23 PROCEDURE — 85025 COMPLETE CBC W/AUTO DIFF WBC: CPT

## 2023-11-23 RX ORDER — PROCHLORPERAZINE EDISYLATE 5 MG/ML
5 INJECTION INTRAMUSCULAR; INTRAVENOUS EVERY 6 HOURS PRN
Status: DISCONTINUED | OUTPATIENT
Start: 2023-11-23 | End: 2023-11-25 | Stop reason: HOSPADM

## 2023-11-23 RX ORDER — METHYLERGONOVINE MALEATE 0.2 MG/ML
200 INJECTION INTRAVENOUS PRN
Status: DISCONTINUED | OUTPATIENT
Start: 2023-11-23 | End: 2023-11-24

## 2023-11-23 RX ORDER — FENTANYL CITRATE 50 UG/ML
INJECTION, SOLUTION INTRAMUSCULAR; INTRAVENOUS PRN
Status: DISCONTINUED | OUTPATIENT
Start: 2023-11-23 | End: 2023-11-25 | Stop reason: SDUPTHER

## 2023-11-23 RX ORDER — ONDANSETRON 2 MG/ML
4 INJECTION INTRAMUSCULAR; INTRAVENOUS EVERY 6 HOURS PRN
Status: DISCONTINUED | OUTPATIENT
Start: 2023-11-23 | End: 2023-11-23

## 2023-11-23 RX ORDER — CARBOPROST TROMETHAMINE 250 UG/ML
250 INJECTION, SOLUTION INTRAMUSCULAR PRN
Status: DISCONTINUED | OUTPATIENT
Start: 2023-11-23 | End: 2023-11-25 | Stop reason: HOSPADM

## 2023-11-23 RX ORDER — SODIUM CHLORIDE, SODIUM LACTATE, POTASSIUM CHLORIDE, AND CALCIUM CHLORIDE .6; .31; .03; .02 G/100ML; G/100ML; G/100ML; G/100ML
500 INJECTION, SOLUTION INTRAVENOUS PRN
Status: DISCONTINUED | OUTPATIENT
Start: 2023-11-23 | End: 2023-11-25 | Stop reason: HOSPADM

## 2023-11-23 RX ORDER — SODIUM CHLORIDE, SODIUM LACTATE, POTASSIUM CHLORIDE, CALCIUM CHLORIDE 600; 310; 30; 20 MG/100ML; MG/100ML; MG/100ML; MG/100ML
INJECTION, SOLUTION INTRAVENOUS CONTINUOUS
Status: DISCONTINUED | OUTPATIENT
Start: 2023-11-23 | End: 2023-11-25 | Stop reason: HOSPADM

## 2023-11-23 RX ORDER — NALOXONE HYDROCHLORIDE 0.4 MG/ML
INJECTION, SOLUTION INTRAMUSCULAR; INTRAVENOUS; SUBCUTANEOUS PRN
Status: DISCONTINUED | OUTPATIENT
Start: 2023-11-23 | End: 2023-11-25 | Stop reason: HOSPADM

## 2023-11-23 RX ORDER — TRANEXAMIC ACID 10 MG/ML
1000 INJECTION, SOLUTION INTRAVENOUS
Status: ACTIVE | OUTPATIENT
Start: 2023-11-23 | End: 2023-11-24

## 2023-11-23 RX ORDER — MISOPROSTOL 200 UG/1
800 TABLET ORAL PRN
Status: DISCONTINUED | OUTPATIENT
Start: 2023-11-23 | End: 2023-11-24

## 2023-11-23 RX ORDER — MAGNESIUM CARB/ALUMINUM HYDROX 105-160MG
30 TABLET,CHEWABLE ORAL DAILY PRN
Status: DISCONTINUED | OUTPATIENT
Start: 2023-11-23 | End: 2023-11-25 | Stop reason: HOSPADM

## 2023-11-23 RX ORDER — DOCUSATE SODIUM 100 MG/1
100 CAPSULE, LIQUID FILLED ORAL 2 TIMES DAILY
Status: DISCONTINUED | OUTPATIENT
Start: 2023-11-23 | End: 2023-11-25 | Stop reason: HOSPADM

## 2023-11-23 RX ORDER — SODIUM CHLORIDE, SODIUM LACTATE, POTASSIUM CHLORIDE, AND CALCIUM CHLORIDE .6; .31; .03; .02 G/100ML; G/100ML; G/100ML; G/100ML
1000 INJECTION, SOLUTION INTRAVENOUS PRN
Status: DISCONTINUED | OUTPATIENT
Start: 2023-11-23 | End: 2023-11-25 | Stop reason: HOSPADM

## 2023-11-23 RX ORDER — LIDOCAINE HYDROCHLORIDE AND EPINEPHRINE BITARTRATE 20; .01 MG/ML; MG/ML
INJECTION, SOLUTION SUBCUTANEOUS PRN
Status: DISCONTINUED | OUTPATIENT
Start: 2023-11-23 | End: 2023-11-25 | Stop reason: SDUPTHER

## 2023-11-23 RX ORDER — SODIUM CHLORIDE 9 MG/ML
25 INJECTION, SOLUTION INTRAVENOUS PRN
Status: DISCONTINUED | OUTPATIENT
Start: 2023-11-23 | End: 2023-11-25 | Stop reason: HOSPADM

## 2023-11-23 RX ORDER — ACETAMINOPHEN 500 MG
1000 TABLET ORAL EVERY 6 HOURS PRN
Status: DISCONTINUED | OUTPATIENT
Start: 2023-11-23 | End: 2023-11-25 | Stop reason: HOSPADM

## 2023-11-23 RX ORDER — SODIUM CHLORIDE 0.9 % (FLUSH) 0.9 %
5-40 SYRINGE (ML) INJECTION EVERY 12 HOURS SCHEDULED
Status: DISCONTINUED | OUTPATIENT
Start: 2023-11-23 | End: 2023-11-25 | Stop reason: HOSPADM

## 2023-11-23 RX ORDER — HYDROMORPHONE HYDROCHLORIDE 1 MG/ML
1 INJECTION, SOLUTION INTRAMUSCULAR; INTRAVENOUS; SUBCUTANEOUS ONCE
Status: DISCONTINUED | OUTPATIENT
Start: 2023-11-23 | End: 2023-11-25 | Stop reason: HOSPADM

## 2023-11-23 RX ORDER — FENTANYL 0.2 MG/100ML-BUPIV 0.125%-NACL 0.9% EPIDURAL INJ 2/0.125 MCG/ML-%
10 SOLUTION INJECTION CONTINUOUS
Status: DISCONTINUED | OUTPATIENT
Start: 2023-11-23 | End: 2023-11-25 | Stop reason: HOSPADM

## 2023-11-23 RX ORDER — ONDANSETRON 2 MG/ML
4 INJECTION INTRAMUSCULAR; INTRAVENOUS EVERY 6 HOURS PRN
Status: DISCONTINUED | OUTPATIENT
Start: 2023-11-23 | End: 2023-11-25 | Stop reason: HOSPADM

## 2023-11-23 RX ORDER — SODIUM CHLORIDE 0.9 % (FLUSH) 0.9 %
5-40 SYRINGE (ML) INJECTION PRN
Status: DISCONTINUED | OUTPATIENT
Start: 2023-11-23 | End: 2023-11-25 | Stop reason: HOSPADM

## 2023-11-23 RX ADMIN — FENTANYL CITRATE 25 MCG: 50 INJECTION, SOLUTION INTRAMUSCULAR; INTRAVENOUS at 23:37

## 2023-11-23 RX ADMIN — FENTANYL CITRATE 75 MCG: 50 INJECTION, SOLUTION INTRAMUSCULAR; INTRAVENOUS at 23:44

## 2023-11-23 RX ADMIN — SODIUM CHLORIDE 5 MILLION UNITS: 900 INJECTION INTRAVENOUS at 22:39

## 2023-11-23 RX ADMIN — ACETAMINOPHEN 1000 MG: 500 TABLET ORAL at 20:09

## 2023-11-23 RX ADMIN — LIDOCAINE HYDROCHLORIDE,EPINEPHRINE BITARTRATE 3.5 ML: 20; .01 INJECTION, SOLUTION INFILTRATION; PERINEURAL at 23:44

## 2023-11-23 NOTE — PROGRESS NOTES
3875- pt arrived with c/o ctx every 6-10 minutes apart. Denies loss of fluid, vaginal bleeding, headache, RUQ pain, blurry vision. Pt positive for fetal movement   6497- Discharge instructions reviewed with pt at this time. Time allotted for questions, all questions answered.

## 2023-11-23 NOTE — H&P
[place] [facility]    Name: Florencio Morley MRN: [id number]  SSN: [id number]    YOB: 1995  Age: 33 y.o. Sex: female      Subjective:     Chief Complaint:  Pregnancy and uterine contractions    History of Present Illness: 33 y.o.  Estimated Date of Delivery: 23  at 37w6d presents to L&D c/o onset uterine contractions. Denies VB/ROM. Good FM. History eclampsia first pregnancy. OB History          2    Para   1    Term   1            AB        Living   1         SAB        IAB        Ectopic        Molar        Multiple        Live Births   1              Past Medical History:   Diagnosis Date    Anxiety     Back pain     Depression     History of migraine 2017    Hypertension in pregnancy, preeclampsia, severe, postpartum condition 2017    Migraine     Morbid obesity ([address]) 03/10/2020    s/p RYGB     Numbness and tingling in both hands     Sleep apnea     Syncope 2017    Trauma 2014    fell and dislocated r shoulder     Past Surgical History:   Procedure Laterality Date    GASTRIC BYPASS,OBESE<150CM JANICE-EN-Y  2020     Social History     Occupational History    Not on file   Tobacco Use    Smoking status: Never    Smokeless tobacco: Never   Vaping Use    Vaping Use: Never used   Substance and Sexual Activity    Alcohol use: Not Currently    Drug use: No    Sexual activity: Yes     Partners: Male     Family History   Problem Relation Age of Onset    Hypertension Sister     Diabetes Father     Diabetes Sister     Diabetes Mother        Allergies   Allergen Reactions    Aspirin      Other reaction(s): Hoarseness    Acetaminophen      Other reaction(s): Not Reported This Time  Not an allergy    Ibuprofen Itching    Nsaids      Other reaction(s): Unknown (comments)     Prior to Admission medications    Medication Sig Start Date End Date Taking?  Authorizing Provider   ferrous sulfate (IRON 325) 325 (65 Fe) MG tablet Take 1 tablet by mouth daily 23   Provider, MD Abrahan   Prenatal Vit-Fe Fumarate-FA (PRENATAL VITAMINS PO) Take by mouth    Provider, MD Abrahan   Calcium Carb-Cholecalciferol 500-10 MG-MCG TABS Take by mouth  Patient not taking: Reported on 11/1/2023    Automatic Reconciliation, Ar   Calcium Carbonate Antacid 400 MG CHEW Take 1 tablet by mouth 3 times daily (with meals)  Patient not taking: Reported on 11/1/2023    Automatic Reconciliation, Ar   cyanocobalamin 1000 MCG tablet Take 1,000 mcg by mouth daily  Patient not taking: Reported on 11/1/2023    Automatic Reconciliation, Ar   ondansetron (ZOFRAN-ODT) 4 MG disintegrating tablet Take 4 mg by mouth every 8 hours as needed  Patient not taking: Reported on 11/1/2023 2/11/21   Automatic Reconciliation, Ar      ROS: N/C except as noted in HPI        Objective:     Vitals:    Vitals:    11/23/23 0347 11/23/23 0429 11/23/23 0510 11/23/23 0530   BP: 133/75 121/73 130/79 127/80   Pulse: 66 83 61 67    BP  Min: 121/73  Max: 133/75     Physical Exam:    General: Well-developed, Well-nourished, and No distress  Abdomen: Gravid and nontender      Cervix 1   Effacement 50%   Station -3   Cx Position Posterior   Position Unknown   Membranes Intact   FHR RNST   [place] q 5 Minutes       Labs:   Recent Results (from the past 24 hour(s))   CBC with Auto Differential    Collection Time: 11/23/23  4:37 AM   Result Value Ref Range    WBC 6.5 3.6 - 11.0 K/uL    RBC 3.44 (L) 3.80 - 5.20 M/uL    Hemoglobin 9.3 (L) 11.5 - 16.0 g/dL    Hematocrit 28.3 (L) 35.0 - 47.0 %    MCV 82.3 80.0 - 99.0 FL    MCH 27.0 26.0 - 34.0 PG    MCHC 32.9 30.0 - 36.5 g/dL    RDW 14.6 (H) 11.5 - 14.5 %    Platelets 244 538 - 664 K/uL    MPV 9.3 8.9 - 12.9 FL    Nucleated RBCs 0.0 0  WBC    nRBC 0.00 0.00 - 0.01 K/uL    Neutrophils % 58 32 - 75 %    Lymphocytes % 32 12 - 49 %    Monocytes % 8 5 - 13 %    Eosinophils % 2 0 - 7 %    Basophils % 0 0 - 1 %    Immature Granulocytes 0 0.0 - 0.5 %    Neutrophils Absolute 3.8 1.8 - 8.0 K/UL

## 2023-11-24 PROCEDURE — 51701 INSERT BLADDER CATHETER: CPT

## 2023-11-24 PROCEDURE — 1120000000 HC RM PRIVATE OB

## 2023-11-24 PROCEDURE — 6370000000 HC RX 637 (ALT 250 FOR IP): Performed by: ADVANCED PRACTICE MIDWIFE

## 2023-11-24 RX ORDER — METHYLERGONOVINE MALEATE 0.2 MG/ML
200 INJECTION INTRAVENOUS PRN
Status: DISCONTINUED | OUTPATIENT
Start: 2023-11-24 | End: 2023-11-25 | Stop reason: HOSPADM

## 2023-11-24 RX ORDER — ACETAMINOPHEN 500 MG
1000 TABLET ORAL EVERY 8 HOURS SCHEDULED
Status: DISCONTINUED | OUTPATIENT
Start: 2023-11-24 | End: 2023-11-25 | Stop reason: HOSPADM

## 2023-11-24 RX ORDER — MISOPROSTOL 200 UG/1
800 TABLET ORAL PRN
Status: DISCONTINUED | OUTPATIENT
Start: 2023-11-24 | End: 2023-11-25 | Stop reason: HOSPADM

## 2023-11-24 RX ORDER — DIPHENHYDRAMINE HYDROCHLORIDE 50 MG/ML
25 INJECTION INTRAMUSCULAR; INTRAVENOUS ONCE
OUTPATIENT
Start: 2023-11-24 | End: 2023-11-24

## 2023-11-24 RX ORDER — SODIUM CHLORIDE 0.9 % (FLUSH) 0.9 %
5-40 SYRINGE (ML) INJECTION PRN
Status: DISCONTINUED | OUTPATIENT
Start: 2023-11-24 | End: 2023-11-25 | Stop reason: HOSPADM

## 2023-11-24 RX ORDER — SODIUM CHLORIDE 0.9 % (FLUSH) 0.9 %
5-40 SYRINGE (ML) INJECTION EVERY 12 HOURS SCHEDULED
Status: DISCONTINUED | OUTPATIENT
Start: 2023-11-24 | End: 2023-11-25 | Stop reason: HOSPADM

## 2023-11-24 RX ORDER — SODIUM CHLORIDE, SODIUM LACTATE, POTASSIUM CHLORIDE, CALCIUM CHLORIDE 600; 310; 30; 20 MG/100ML; MG/100ML; MG/100ML; MG/100ML
INJECTION, SOLUTION INTRAVENOUS CONTINUOUS
Status: DISCONTINUED | OUTPATIENT
Start: 2023-11-24 | End: 2023-11-25 | Stop reason: HOSPADM

## 2023-11-24 RX ORDER — SODIUM CHLORIDE 9 MG/ML
INJECTION, SOLUTION INTRAVENOUS PRN
Status: DISCONTINUED | OUTPATIENT
Start: 2023-11-24 | End: 2023-11-25 | Stop reason: HOSPADM

## 2023-11-24 RX ORDER — DOCUSATE SODIUM 100 MG/1
100 CAPSULE, LIQUID FILLED ORAL 2 TIMES DAILY
Status: DISCONTINUED | OUTPATIENT
Start: 2023-11-24 | End: 2023-11-25 | Stop reason: HOSPADM

## 2023-11-24 RX ORDER — LANOLIN/MINERAL OIL
LOTION (ML) TOPICAL PRN
Status: DISCONTINUED | OUTPATIENT
Start: 2023-11-24 | End: 2023-11-25 | Stop reason: HOSPADM

## 2023-11-24 RX ADMIN — ACETAMINOPHEN 1000 MG: 500 TABLET ORAL at 15:12

## 2023-11-24 RX ADMIN — MINERAL OIL 30 ML: 1000 SOLUTION ORAL at 00:07

## 2023-11-24 RX ADMIN — ACETAMINOPHEN 1000 MG: 500 TABLET ORAL at 23:07

## 2023-11-24 RX ADMIN — ACETAMINOPHEN 1000 MG: 500 TABLET ORAL at 06:21

## 2023-11-24 RX ADMIN — DOCUSATE SODIUM 100 MG: 100 CAPSULE, LIQUID FILLED ORAL at 23:07

## 2023-11-24 RX ADMIN — DOCUSATE SODIUM 100 MG: 100 CAPSULE, LIQUID FILLED ORAL at 07:55

## 2023-11-24 RX ADMIN — Medication 166.7 ML: at 00:06

## 2023-11-24 ASSESSMENT — PAIN DESCRIPTION - DESCRIPTORS
DESCRIPTORS: CRAMPING
DESCRIPTORS: ACHING;CRAMPING
DESCRIPTORS: CRAMPING

## 2023-11-24 ASSESSMENT — PAIN DESCRIPTION - ORIENTATION
ORIENTATION: LOWER
ORIENTATION: MID

## 2023-11-24 ASSESSMENT — PAIN SCALES - GENERAL
PAINLEVEL_OUTOF10: 3
PAINLEVEL_OUTOF10: 5
PAINLEVEL_OUTOF10: 5

## 2023-11-24 ASSESSMENT — PAIN DESCRIPTION - LOCATION
LOCATION: ABDOMEN

## 2023-11-24 NOTE — ANESTHESIA PRE PROCEDURE
Abdominal:             Vascular: negative vascular ROS. Other Findings:           Anesthesia Plan      CSE     ASA 2             Anesthetic plan and risks discussed with patient. Use of blood products discussed with patient whom.                      Robert Ahumada DO   11/23/2023

## 2023-11-24 NOTE — L&D DELIVERY NOTE
CNM Delivery Note       Patient: Alan Dinero MRN: [id number]  SSN: [id number]    YOB: 1995  Age: 33 y.o. Sex: female      Complete cervical dilation at 2348.  of a live male infant at 80 with Apgars 9,9 in ASHLEY position under epidural anesthesia with mother in lithotomy position. Shoulders spontaneous, easily delivered with maternal effort. Infant born en caul. Vigorous infant placed on maternal abdomen immediately following delivery. Placenta and membranes spontaneous, intact, with 3 vessel cord via Asa Calamity mechanism at 0001. Fundus massaged to firm. QBL 50 mL. Vagina and perineum inspected. Perineum intact. Mother and infant stable, bonding, establishing breastfeeding. Eneida Sahni Early [place] [[id number]]      Labor Events     Labor: No   Steroids: None  Cervical Ripening Date/Time:      Antibiotics Received during Labor: Yes  Rupture Date/Time:      Rupture Type: SROM  Fluid Color: Clear  Fluid Volume:  Moderate  Induction: None  Labor Complications: None              Anesthesia    Method: Epidural       Delivery Details      Delivery Date: 23 Delivery Time: 23:55:00   Delivery Type: Vaginal, Spontaneous              [place] Presentation    Presentation: Vertex  Position: Left  _: Occiput  _: Anterior       Shoulder Dystocia    Shoulder Dystocia Present?: No                                                                                                           Assisted Delivery Details    Forceps Attempted?: No  Vacuum Extractor Attempted?: No                                                             Cord    Vessels: 3 Vessels  Complications: None  Delayed Cord Clamping?: Yes  Cord Clamped Date/Time: 2023 23:59:26  Cord Blood Disposition: Lab  Gases Sent?: No              Placenta    Date/Time: 2023 00:01:35  Removal: Spontaneous  Appearance: Intact  Disposition: Discarded       Lacerations    Episiotomy: None  Perineal Lacerations: None  Other Lacerations: no non-perineal laceration       Vaginal Counts    Initial Count Personnel: HUMBERTO VEE CNM  Initial Count Verified By: Destiny Bravo RN  Intial Sponge Count: Correct Intial Needles Count: Correct Intial Instruments Count: Correct   Final Sponges Count: Correct Final Needles  Count: Correct Final Instruments Count: Correct   Final Count Personnel: Krissy GRECO  Final Count Verified By: Destiny Bravo RN  Accurate Final Count?: Yes       Blood Loss  Mother: Jill Quevedo #[id number]     Start of Mother's Information      Delivery Blood Loss  23 1155 - 23 0018      None                 End of Mother's Information  Mother: Jill Quevedo #[id number]                Delivery Providers    Delivering clinician: YOUNG Enriquez CNM     Provider Role    Heather Gómez, RN Primary Nurse    Josseline Hutton, RN Primary Vestal Nurse    YOUNG Enriquez CNM Midwife    Cat Burleson, RN Nursery Nurse    Inna Howard, RN Staff Nurse    [facility], RN Charge Nurse               Assessment    Living Status: Living        Skin Color:   Heart Rate:   Reflex Irritability:   Muscle Tone:   Respiratory Effort: Total:            1 Minute:    1    2    2    2    2    9         5 Minute:    1    2    2    2    2    9                                        Apgars Assigned By: MILTON TORRES RN              Resuscitation    Method: Bulb Suction, Stimulation              Measurements                     Jessenia Virgen

## 2023-11-24 NOTE — ANESTHESIA PROCEDURE NOTES
CSE Block    Patient location during procedure: OB  Start time: 11/23/2023 11:31 PM  End time: 11/23/2023 11:49 PM  Reason for block: labor epidural  Staffing  Performed: anesthesiologist   Anesthesiologist: Araceli Richard DO  Performed by: Araceli Richard DO  Authorized by: Araceli Richard DO    CSE  Patient position: sitting  Prep: Betadine  Patient monitoring: continuous pulse ox and frequent blood pressure checks  Approach: midline  Provider prep: mask and sterile gloves  Spinal Needle  Needle type: pencil-tip (Jr)   Needle gauge: 27 G  Needle length: 4.75 in  Epidural Needle  Injection technique: EVER air  Epidural needle type: Dorothy Chiu. Needle gauge: 18 G  Needle length: 3.5 in  Location: lumbar (1-5)  Catheter  Catheter type: end hole (inserted 2.5 cm)  Catheter size: 20G.   Catheter at skin depth: 10 cm  Test dose: negative  Assessment  Hemodynamics: stable  Additional Notes  Tolerated well  Preanesthetic Checklist  Completed: patient identified, IV checked, site marked, risks and benefits discussed, surgical/procedural consents, equipment checked, pre-op evaluation, timeout performed, anesthesia consent given, oxygen available and monitors applied/VS acknowledged

## 2023-11-24 NOTE — PLAN OF CARE
Problem: Vaginal Birth or  Section  Goal: Labor progression, first stage, within specified pattern  2023 by Angelia Arenas RN  Outcome: Progressing  2023 2300 by Angelia Arenas RN  Outcome: Progressing     Problem: Postpartum  Goal: Experiences normal postpartum course  Description: INTERVENTIONS:  2023 by Angelia Arenas RN  Outcome: Progressing  2023 2300 by Angelia Arenas RN  Outcome: Progressing     Problem: Pain  Goal: Pain control  Description: Patient will demonstrate personal actions to control pain.   2023 004 by Angelia Arenas RN  Outcome: Progressing  2023 2300 by Angelia Arenas RN  Outcome: Progressing     Problem: Safety - Adult  Goal: Ensuring Safety  2023 by Angelia Arenas RN  Outcome: Progressing  2023 2300 by Angelia Arenas RN  Outcome: Progressing     Problem: Discharge Planning  Goal: Discharge to appropriate level of care  2023 by Angelia Arenas RN  Outcome: Progressing  2023 2300 by Angelia Arenas RN  Outcome: Progressing     Problem: Chronic Conditions and Co-morbidities  Goal: Ensuring Safety  2023 by Angelia Arenas RN  Outcome: Progressing  2023 2300 by Angelia Arenas RN  Outcome: Progressing     Problem: Cardiovascular - Adult  Goal: Heart rate and blood presure are normal for age  2023 by Angelia Arenas RN  Outcome: Progressing  2023 2300 by Angelia Arenas RN  Outcome: Progressing

## 2023-11-24 NOTE — PROGRESS NOTES
12- Dr. Tobi Jang at [facility] for epidural placement   Time out 2331  Test dose 2336  Bolus dose  2346  Pt lying down   2346 CNM at bedside   2348- Patient actively pushing. RN remains in continuous attendance at the bedside. Assessment & evaluation of fetal heart rate ongoing via continuous EFM. 2355- RN remained at bedside throughout pushing. EFM continuously assessed. Vaginal delivery of viable infant.

## 2023-11-24 NOTE — H&P
Subjective:       Ashley Miramontes is a 33 y.o.  female with [facility] 23 at 40 and 6/7 weeks gestation who is being admitted for labor management, antibiotic therapy. Her current obstetrical history is significant for +GBS. Patient reports no bleeding, no leaking, contractions since yesterday afternoon. Fetal Movement: normal.      Objective:       BP (!) 140/83   Pulse 72   LMP 2023 (Exact Date)     General:   alert, appears stated age, and cooperative   Lungs:   clear to auscultation bilaterally   Heart:   regular rate and rhythm, S1, S2 normal, no murmur, click, rub or gallop   Abdomen:  soft, non-tender; bowel sounds normal; no masses,  no organomegaly and gravid uterus   Pelvis:  Vulva and vagina appear normal. Bimanual exam reveals normal uterus and adnexa. FHT:  120 BPM   Presentations: Cephalic   Cervix:     Dilation: 3cm    Effacement: 90    Station:  0    Consistency: Soft    Position: Posterior     Lab Review   O, Rh+, GBS positive   AFP:NML   One hour GTT: Normal      Assessment:      37 and 6/7 weeks gestation. Early latent labor. , Adequate uterine activity - intensity and frequency. , and Satisfactory labor progress. Obstetrical history significant for GBS colonizer. Plan:      Risks, benefits, alternatives and possible complications have been discussed in detail with the patient. Pre-admission, admission, and post admission procedures and expectations were discussed in detail. All questions answered, all appropriate consents will be signed at the Hospital. Admission is planned for today. Continue present management., Expectant management., Anticipate vaginal delivery, desires epidural.    Expecting male infant.     [place], CNM

## 2023-11-24 NOTE — PLAN OF CARE
Problem: Vaginal Birth or  Section  Goal: Labor progression, first stage, within specified pattern  Outcome: Progressing     Problem: Postpartum  Goal: Experiences normal postpartum course  Description: INTERVENTIONS:  Outcome: Progressing     Problem: Pain  Goal: Pain control  Description: Patient will demonstrate personal actions to control pain.   Outcome: Progressing     Problem: Infection - Adult  Goal: *No signs and symptoms of infection  Outcome: Progressing     Problem: Safety - Adult  Goal: Ensuring Safety  Outcome: Progressing     Problem: Discharge Planning  Goal: Discharge to appropriate level of care  Outcome: Progressing     Problem: Chronic Conditions and Co-morbidities  Goal: Ensuring Safety  Outcome: Progressing     Problem: Cardiovascular - Adult  Goal: Heart rate and blood presure are normal for age  Outcome: Progressing

## 2023-11-25 VITALS
OXYGEN SATURATION: 99 % | HEART RATE: 76 BPM | RESPIRATION RATE: 16 BRPM | SYSTOLIC BLOOD PRESSURE: 120 MMHG | TEMPERATURE: 98.2 F | DIASTOLIC BLOOD PRESSURE: 74 MMHG

## 2023-11-25 PROCEDURE — 6370000000 HC RX 637 (ALT 250 FOR IP): Performed by: ADVANCED PRACTICE MIDWIFE

## 2023-11-25 RX ADMIN — ACETAMINOPHEN 1000 MG: 500 TABLET ORAL at 07:06

## 2023-11-25 ASSESSMENT — PAIN DESCRIPTION - LOCATION: LOCATION: ABDOMEN

## 2023-11-25 ASSESSMENT — PAIN DESCRIPTION - ORIENTATION: ORIENTATION: MID

## 2023-11-25 ASSESSMENT — PAIN DESCRIPTION - DESCRIPTORS: DESCRIPTORS: ACHING;CRAMPING

## 2023-11-25 NOTE — LACTATION NOTE
This note was copied from a baby's chart. Mother states she would like to BF. Mother has been offering breast and formula. BF basics reviewed. Lactogenesis explained. Baby fussy, encouraged mother to offer breast.  Baby latched on easily in cradle hold. Hand expression taught, milk easily expressed. Reviewed latching with mother to achieve a deeper more comfortable latch. Discharge info reviewed. Discussed with mother her plan for feeding. Reviewed the benefits of exclusive breast milk feeding during the hospital stay. Informed her of the risks of using formula to supplement in the first few days of life as well as the benefits of successful breast milk feeding; referred her to the Breastfeeding booklet about this information. She acknowledges understanding of information reviewed and states that it is her plan to breastfeed and formula feed her infant. Will support her choice and offer additional information as needed. Reviewed breastfeeding basics:  How milk is made and normal  breastfeeding behaviors discussed. Supply and demand,  stomach size, early feeding cues, skin to skin bonding with comfortable positioning and baby led latch-on reviewed. How to identify signs of successful breastfeeding sessions reviewed; education on  latching vs shallow, in-effective latching, normal  feeding frequency and duration and expected infant output discussed. Breastfeeding Booklet and Warm line information provided with discussion. Discussed typical  weight loss and the importance of pediatrician appointment within 24-48 hours of discharge, at 2 weeks of life and normalcy of requesting pediatric weight checks as needed in between visits.     Pt chooses to do both breast and bottle feeding, will follow recommendations to breastfeed first to help establish milk supply and only top off with formula, if needed, will be educated on potential consequences of early supplementation on breastfeeding success, but will be supported in decision to do both. Chart shows numerous feedings, void, stool WNL. Discussed importance of monitoring outputs and feedings on first week of life. Discussed ways to tell if baby is  getting enough breast milk, ie  voids and stools, change in color of stool, and return to birth wt within 2 weeks. Follow up with pediatrician visit for weight check in 1-2 days (per AAP guidelines.)  Encouraged to call Warm Line  [phone]  for any questions/problems that arise. Mother also given breastfeeding support group dates and times for any future needs     Pt will successfully establish breastfeeding by feeding in response to early feeding cues   or wake every 3h, will obtain deep latch, and will keep log of feedings/output. Taught to BF at hunger cues and or q 2-3 hrs and to offer 10-20 drops of hand expressed colostrum at any non-feeds. Left Breast: Soft  Left Nipple: Protrude  Right Nipple: Protrude  Right Breast: Soft  Position and Latch:  Independently, Good technique  Signs of Transfer: Nutritive sucking  Maternal Response: Attentive      Formula Type: Similac 360 Total Care     Latch: Grasps breast, tongue down, lips flanged, rhythmic sucking  Audible Swallowing: A few with stimulation  Type of Nipple: Everted (after stimulation)  Comfort (Breast/Nipple): Soft/non-tender  Hold (Positioning): Full assist, teach one side, mother does other, staff holds  [facility] Score: 8

## 2023-11-25 NOTE — DISCHARGE SUMMARY
Obstetrical Discharge Summary     Name: Greg Farah MRN: [id number]  SSN: [id number]    YOB: 1995  Age: 33 y.o. Sex: female      Admit Date: 2023    Discharge Date: 2023     Attending Physician:  Haim Zelaya MD     Delivering Physician:  Isela Webb CNM      * Admission Diagnoses:   IUP @ 37w6d  Labor      * Discharge Diagnoses:   Delivery of a VMI via  by Isela Webb CNM on 2023. Apgars were 9 and 9. Same a above         Additional Diagnoses:      No results found for: \"RUBELLAEXT\", \"GRBSEXT\"   There is no immunization history for the selected administration types on file for this patient. * Procedures:            * Discharge Condition: good    * Hospital Course: Normal hospital course following the delivery. * Disposition: Home    Discharge Medications:      Medication List        ASK your doctor about these medications      Calcium Carb-Cholecalciferol 500-10 MG-MCG Tabs     Calcium Carbonate Antacid 400 MG Chew     cyanocobalamin 1000 MCG tablet     ferrous sulfate 325 (65 Fe) MG tablet  Commonly known as: IRON 325     ondansetron 4 MG disintegrating tablet  Commonly known as: ZOFRAN-ODT     PRENATAL VITAMINS PO              * Follow-up Care/Patient Instructions:   Activity: Activity as tolerated  Diet: Regular Diet  Wound Care: As directed  Followup 6 weeks for PP check        Signed By:  Nadya Swain MD     2023

## (undated) PROCEDURE — 4A1HXCZ MONITORING OF PRODUCTS OF CONCEPTION, CARDIAC RATE, EXTERNAL APPROACH: ICD-10-PCS

## (undated) PROCEDURE — 00HU33Z INSERTION OF INFUSION DEVICE INTO SPINAL CANAL, PERCUTANEOUS APPROACH: ICD-10-PCS